# Patient Record
Sex: MALE | Race: BLACK OR AFRICAN AMERICAN | NOT HISPANIC OR LATINO | Employment: FULL TIME | URBAN - METROPOLITAN AREA
[De-identification: names, ages, dates, MRNs, and addresses within clinical notes are randomized per-mention and may not be internally consistent; named-entity substitution may affect disease eponyms.]

---

## 2023-02-19 PROBLEM — E78.5 DYSLIPIDEMIA: Status: ACTIVE | Noted: 2023-02-19

## 2023-02-19 PROBLEM — D12.6 TUBULAR ADENOMA OF COLON: Status: ACTIVE | Noted: 2023-02-19

## 2023-02-19 PROBLEM — E78.5 HYPERLIPIDEMIA: Status: ACTIVE | Noted: 2023-02-19

## 2023-02-19 PROBLEM — I10 HYPERTENSION, UNCONTROLLED: Status: ACTIVE | Noted: 2023-02-19

## 2023-02-19 PROBLEM — I10 BENIGN ESSENTIAL HYPERTENSION: Status: ACTIVE | Noted: 2023-02-19

## 2023-02-19 PROBLEM — R97.20 ELEVATED PROSTATE SPECIFIC ANTIGEN (PSA): Status: ACTIVE | Noted: 2023-02-19

## 2023-02-19 PROBLEM — R22.9 SINGLE SKIN NODULE: Status: ACTIVE | Noted: 2023-02-19

## 2023-02-19 PROBLEM — H93.13 TINNITUS OF BOTH EARS: Status: ACTIVE | Noted: 2023-02-19

## 2023-02-19 PROBLEM — N40.0 BPH (BENIGN PROSTATIC HYPERPLASIA): Status: ACTIVE | Noted: 2023-02-19

## 2023-02-19 RX ORDER — AMLODIPINE BESYLATE 10 MG/1
1 TABLET ORAL DAILY
COMMUNITY
Start: 2019-05-09 | End: 2023-03-14 | Stop reason: SDUPTHER

## 2023-03-14 ENCOUNTER — LAB (OUTPATIENT)
Dept: LAB | Facility: LAB | Age: 68
End: 2023-03-14
Payer: COMMERCIAL

## 2023-03-14 ENCOUNTER — OFFICE VISIT (OUTPATIENT)
Dept: PRIMARY CARE | Facility: CLINIC | Age: 68
End: 2023-03-14
Payer: COMMERCIAL

## 2023-03-14 VITALS
BODY MASS INDEX: 29.03 KG/M2 | OXYGEN SATURATION: 97 % | SYSTOLIC BLOOD PRESSURE: 187 MMHG | HEIGHT: 73 IN | HEART RATE: 69 BPM | DIASTOLIC BLOOD PRESSURE: 111 MMHG | WEIGHT: 219 LBS

## 2023-03-14 DIAGNOSIS — I10 HYPERTENSION, UNCONTROLLED: Primary | ICD-10-CM

## 2023-03-14 DIAGNOSIS — R97.20 ELEVATED PROSTATE SPECIFIC ANTIGEN (PSA): ICD-10-CM

## 2023-03-14 DIAGNOSIS — Z13.1 SCREENING FOR DIABETES MELLITUS: ICD-10-CM

## 2023-03-14 DIAGNOSIS — E78.00 PURE HYPERCHOLESTEROLEMIA: ICD-10-CM

## 2023-03-14 DIAGNOSIS — I10 HYPERTENSION, UNCONTROLLED: ICD-10-CM

## 2023-03-14 DIAGNOSIS — R97.20 ELEVATED PSA: ICD-10-CM

## 2023-03-14 DIAGNOSIS — D64.9 ANEMIA, UNSPECIFIED TYPE: ICD-10-CM

## 2023-03-14 DIAGNOSIS — D12.6 TUBULAR ADENOMA OF COLON: ICD-10-CM

## 2023-03-14 DIAGNOSIS — Z00.00 ANNUAL PHYSICAL EXAM: ICD-10-CM

## 2023-03-14 LAB
ALANINE AMINOTRANSFERASE (SGPT) (U/L) IN SER/PLAS: 19 U/L (ref 10–52)
ALBUMIN (G/DL) IN SER/PLAS: 4.2 G/DL (ref 3.4–5)
ALKALINE PHOSPHATASE (U/L) IN SER/PLAS: 67 U/L (ref 33–136)
ANION GAP IN SER/PLAS: 12 MMOL/L (ref 10–20)
ASPARTATE AMINOTRANSFERASE (SGOT) (U/L) IN SER/PLAS: 21 U/L (ref 9–39)
BILIRUBIN TOTAL (MG/DL) IN SER/PLAS: 0.8 MG/DL (ref 0–1.2)
CALCIUM (MG/DL) IN SER/PLAS: 9.6 MG/DL (ref 8.6–10.6)
CARBON DIOXIDE, TOTAL (MMOL/L) IN SER/PLAS: 27 MMOL/L (ref 21–32)
CHLORIDE (MMOL/L) IN SER/PLAS: 106 MMOL/L (ref 98–107)
CHOLESTEROL (MG/DL) IN SER/PLAS: 250 MG/DL (ref 0–199)
CHOLESTEROL IN HDL (MG/DL) IN SER/PLAS: 46.7 MG/DL
CHOLESTEROL/HDL RATIO: 5.4
CREATININE (MG/DL) IN SER/PLAS: 1.31 MG/DL (ref 0.5–1.3)
ERYTHROCYTE DISTRIBUTION WIDTH (RATIO) BY AUTOMATED COUNT: 15.5 % (ref 11.5–14.5)
ERYTHROCYTE MEAN CORPUSCULAR HEMOGLOBIN CONCENTRATION (G/DL) BY AUTOMATED: 31.2 G/DL (ref 32–36)
ERYTHROCYTE MEAN CORPUSCULAR VOLUME (FL) BY AUTOMATED COUNT: 96 FL (ref 80–100)
ERYTHROCYTES (10*6/UL) IN BLOOD BY AUTOMATED COUNT: 5.12 X10E12/L (ref 4.5–5.9)
ESTIMATED AVERAGE GLUCOSE FOR HBA1C: 120 MG/DL
GFR MALE: 60 ML/MIN/1.73M2
GLUCOSE (MG/DL) IN SER/PLAS: 81 MG/DL (ref 74–99)
HEMATOCRIT (%) IN BLOOD BY AUTOMATED COUNT: 49.3 % (ref 41–52)
HEMOGLOBIN (G/DL) IN BLOOD: 15.4 G/DL (ref 13.5–17.5)
HEMOGLOBIN A1C/HEMOGLOBIN TOTAL IN BLOOD: 5.8 %
LDL: 183 MG/DL (ref 0–99)
LEUKOCYTES (10*3/UL) IN BLOOD BY AUTOMATED COUNT: 8.5 X10E9/L (ref 4.4–11.3)
NRBC (PER 100 WBCS) BY AUTOMATED COUNT: 0 /100 WBC (ref 0–0)
PLATELETS (10*3/UL) IN BLOOD AUTOMATED COUNT: 258 X10E9/L (ref 150–450)
POTASSIUM (MMOL/L) IN SER/PLAS: 5.1 MMOL/L (ref 3.5–5.3)
PROSTATE SPECIFIC ANTIGEN,SCREEN: 7.5 NG/ML (ref 0–4)
PROTEIN TOTAL: 7.3 G/DL (ref 6.4–8.2)
SODIUM (MMOL/L) IN SER/PLAS: 140 MMOL/L (ref 136–145)
TRIGLYCERIDE (MG/DL) IN SER/PLAS: 103 MG/DL (ref 0–149)
UREA NITROGEN (MG/DL) IN SER/PLAS: 13 MG/DL (ref 6–23)
VLDL: 21 MG/DL (ref 0–40)

## 2023-03-14 PROCEDURE — 85027 COMPLETE CBC AUTOMATED: CPT

## 2023-03-14 PROCEDURE — 80053 COMPREHEN METABOLIC PANEL: CPT

## 2023-03-14 PROCEDURE — 1160F RVW MEDS BY RX/DR IN RCRD: CPT | Performed by: INTERNAL MEDICINE

## 2023-03-14 PROCEDURE — 99213 OFFICE O/P EST LOW 20 MIN: CPT | Performed by: INTERNAL MEDICINE

## 2023-03-14 PROCEDURE — 1170F FXNL STATUS ASSESSED: CPT | Performed by: INTERNAL MEDICINE

## 2023-03-14 PROCEDURE — 3080F DIAST BP >= 90 MM HG: CPT | Performed by: INTERNAL MEDICINE

## 2023-03-14 PROCEDURE — 36415 COLL VENOUS BLD VENIPUNCTURE: CPT

## 2023-03-14 PROCEDURE — 3077F SYST BP >= 140 MM HG: CPT | Performed by: INTERNAL MEDICINE

## 2023-03-14 PROCEDURE — 80061 LIPID PANEL: CPT

## 2023-03-14 PROCEDURE — 1159F MED LIST DOCD IN RCRD: CPT | Performed by: INTERNAL MEDICINE

## 2023-03-14 PROCEDURE — 84153 ASSAY OF PSA TOTAL: CPT

## 2023-03-14 PROCEDURE — 83036 HEMOGLOBIN GLYCOSYLATED A1C: CPT

## 2023-03-14 RX ORDER — FLUTICASONE PROPIONATE 50 MCG
1 SPRAY, SUSPENSION (ML) NASAL DAILY
COMMUNITY
Start: 2022-10-29

## 2023-03-14 RX ORDER — AMLODIPINE BESYLATE 10 MG/1
10 TABLET ORAL DAILY
Qty: 90 TABLET | Refills: 2 | Status: SHIPPED | OUTPATIENT
Start: 2023-03-14 | End: 2023-03-15 | Stop reason: SDUPTHER

## 2023-03-14 ASSESSMENT — ACTIVITIES OF DAILY LIVING (ADL)
DRESSING YOURSELF: INDEPENDENT
GROOMING: INDEPENDENT
TOILETING: INDEPENDENT
HEARING - LEFT EAR: FUNCTIONAL
PATIENT'S MEMORY ADEQUATE TO SAFELY COMPLETE DAILY ACTIVITIES?: YES
FEEDING YOURSELF: INDEPENDENT
ADEQUATE_TO_COMPLETE_ADL: YES
BATHING: INDEPENDENT
WALKS IN HOME: INDEPENDENT
HEARING - RIGHT EAR: FUNCTIONAL
JUDGMENT_ADEQUATE_SAFELY_COMPLETE_DAILY_ACTIVITIES: YES

## 2023-03-14 ASSESSMENT — COLUMBIA-SUICIDE SEVERITY RATING SCALE - C-SSRS
1. IN THE PAST MONTH, HAVE YOU WISHED YOU WERE DEAD OR WISHED YOU COULD GO TO SLEEP AND NOT WAKE UP?: NO
2. HAVE YOU ACTUALLY HAD ANY THOUGHTS OF KILLING YOURSELF?: NO
6. HAVE YOU EVER DONE ANYTHING, STARTED TO DO ANYTHING, OR PREPARED TO DO ANYTHING TO END YOUR LIFE?: NO

## 2023-03-14 ASSESSMENT — PATIENT HEALTH QUESTIONNAIRE - PHQ9
1. LITTLE INTEREST OR PLEASURE IN DOING THINGS: NOT AT ALL
2. FEELING DOWN, DEPRESSED OR HOPELESS: NOT AT ALL
SUM OF ALL RESPONSES TO PHQ9 QUESTIONS 1 AND 2: 0

## 2023-03-14 NOTE — PROGRESS NOTES
"Patient ID: Peña Arenas is a 68 y.o. male who presents for Annual Exam.    BP (!) 187/111   Pulse 69   Ht 1.854 m (6' 1\")   Wt 99.3 kg (219 lb)   SpO2 97%   BMI 28.89 kg/m²     HPI      68-year-old Afro-American male patient is here for follow-up patient has hypertension for many years.  Denies headache, denies palpitation, denies neck stiffness, denies double vision.. And blurry vision, denies shortness of breath, denies chest pain ,denies PND, denies orthopnea, and  denies leg swelling.    He ran out of medication  He is taking amlodipine 10 mg every day      Hyperlipidemia not on any medication      Elevated PSA  No blood in the urine  No urinary symptoms        Subjective     Review of Systems   All other systems reviewed and are negative.      Objective     Physical Exam  Vitals and nursing note reviewed.   Constitutional:       Appearance: Normal appearance.   HENT:      Head: Normocephalic and atraumatic.   Cardiovascular:      Rate and Rhythm: Normal rate and regular rhythm.      Pulses: Normal pulses.      Heart sounds: Normal heart sounds.   Pulmonary:      Effort: Pulmonary effort is normal.      Breath sounds: Normal breath sounds.   Neurological:      General: No focal deficit present.      Mental Status: He is alert and oriented to person, place, and time.   Psychiatric:         Mood and Affect: Mood normal.         Behavior: Behavior normal.         Thought Content: Thought content normal.         Judgment: Judgment normal.         Lab Results   Component Value Date    WBC 8.0 03/02/2022    HGB 15.0 03/02/2022    HCT 47.3 03/02/2022    MCV 98 03/02/2022     03/02/2022           Problem List Items Addressed This Visit          Circulatory    Hypertension, uncontrolled - Primary    Relevant Medications    amLODIPine (Norvasc) 10 mg tablet    Other Relevant Orders    Comprehensive Metabolic Panel       Digestive    Tubular adenoma of colon       Genitourinary    Elevated prostate specific " antigen (PSA)    Relevant Orders    Referral to Urology       Other    Hyperlipidemia    Relevant Orders    Lipid Panel     Other Visit Diagnoses       Annual physical exam        Relevant Orders    Comprehensive Metabolic Panel    Anemia, unspecified type        Relevant Orders    CBC    Screening for diabetes mellitus        Relevant Orders    Hemoglobin A1C    Elevated PSA        Relevant Orders    Prostate Spec.Ag,Screen               Talk to the patient in detail, that since his blood pressure is uncontrolled  It can put him at high risk for complications from uncontrolled diabetes  Including stroke, heart failure kidney failure    Amlodipine 10 mg 1 pill every day filled  We will see him in 2 months time  Advised him to avoid any extra salt while eating  Advised him to avoid any alcohol while drinking  Advised him to use avoid any NSAID use  Will get CBC, comprehensive metabolic panel, lipid, PSA,    He needs to follow-up with urology with elevated PSA  Which she has not done  I told him elevated PSA could be a sign of prostrate cancer

## 2023-03-15 DIAGNOSIS — I10 HYPERTENSION, UNCONTROLLED: ICD-10-CM

## 2023-03-15 RX ORDER — AMLODIPINE BESYLATE 10 MG/1
10 TABLET ORAL DAILY
Qty: 90 TABLET | Refills: 2 | Status: SHIPPED | OUTPATIENT
Start: 2023-03-15 | End: 2023-05-15 | Stop reason: SDUPTHER

## 2023-03-15 NOTE — TELEPHONE ENCOUNTER
Spoke with patient and advised of message   ----- Message from Tiny Rodriguez MD sent at 3/14/2023  2:17 PM EDT -----  Please notify the notify the patient that his lab overall looks good except 2 things PSA is elevated though it has come down compared to last time and I already placed a referral for urology he most likely will be called by the urology to make that appointment and make sure he takes care of that and since his cholesterol and LDL cholesterol is very high I am not going to put him on any medications right away rather I would like him to lose weight do some exercise eat healthy cut back portion size cut back total calories intake cut back carbs sweets fast food deep-fried is animal fat and need to take more fruits and vegetables

## 2023-03-17 ENCOUNTER — TELEPHONE (OUTPATIENT)
Dept: PRIMARY CARE | Facility: CLINIC | Age: 68
End: 2023-03-17
Payer: COMMERCIAL

## 2023-03-17 NOTE — TELEPHONE ENCOUNTER
----- Message from Tiny Rodriguez MD sent at 3/14/2023  2:17 PM EDT -----  Please notify the notify the patient that his lab overall looks good except 2 things PSA is elevated though it has come down compared to last time and I already placed a referral for urology he most likely will be called by the urology to make that appointment and make sure he takes care of that and since his cholesterol and LDL cholesterol is very high I am not going to put him on any medications right away rather I would like him to lose weight do some exercise eat healthy cut back portion size cut back total calories intake cut back carbs sweets fast food deep-fried is animal fat and need to take more fruits and vegetables

## 2023-05-15 ENCOUNTER — OFFICE VISIT (OUTPATIENT)
Dept: PRIMARY CARE | Facility: CLINIC | Age: 68
End: 2023-05-15
Payer: COMMERCIAL

## 2023-05-15 VITALS
WEIGHT: 218.4 LBS | OXYGEN SATURATION: 96 % | HEART RATE: 66 BPM | DIASTOLIC BLOOD PRESSURE: 72 MMHG | SYSTOLIC BLOOD PRESSURE: 120 MMHG | BODY MASS INDEX: 28.81 KG/M2

## 2023-05-15 DIAGNOSIS — E78.5 HYPERLIPIDEMIA, UNSPECIFIED HYPERLIPIDEMIA TYPE: ICD-10-CM

## 2023-05-15 DIAGNOSIS — D12.6 TUBULAR ADENOMA OF COLON: Primary | ICD-10-CM

## 2023-05-15 DIAGNOSIS — N40.1 BENIGN PROSTATIC HYPERPLASIA WITH LOWER URINARY TRACT SYMPTOMS, SYMPTOM DETAILS UNSPECIFIED: ICD-10-CM

## 2023-05-15 DIAGNOSIS — I10 HYPERTENSION, UNCONTROLLED: ICD-10-CM

## 2023-05-15 DIAGNOSIS — I10 BENIGN ESSENTIAL HYPERTENSION: ICD-10-CM

## 2023-05-15 DIAGNOSIS — R97.20 ELEVATED PROSTATE SPECIFIC ANTIGEN (PSA): ICD-10-CM

## 2023-05-15 PROCEDURE — 1159F MED LIST DOCD IN RCRD: CPT | Performed by: INTERNAL MEDICINE

## 2023-05-15 PROCEDURE — 99213 OFFICE O/P EST LOW 20 MIN: CPT | Performed by: INTERNAL MEDICINE

## 2023-05-15 PROCEDURE — 3078F DIAST BP <80 MM HG: CPT | Performed by: INTERNAL MEDICINE

## 2023-05-15 PROCEDURE — 1160F RVW MEDS BY RX/DR IN RCRD: CPT | Performed by: INTERNAL MEDICINE

## 2023-05-15 PROCEDURE — 3074F SYST BP LT 130 MM HG: CPT | Performed by: INTERNAL MEDICINE

## 2023-05-15 RX ORDER — AMLODIPINE BESYLATE 10 MG/1
10 TABLET ORAL DAILY
Qty: 90 TABLET | Refills: 2 | Status: SHIPPED | OUTPATIENT
Start: 2023-05-15 | End: 2024-02-24 | Stop reason: SDUPTHER

## 2023-05-15 ASSESSMENT — ENCOUNTER SYMPTOMS
ORTHOPNEA: 0
PALPITATIONS: 0
SHORTNESS OF BREATH: 0
HEADACHES: 0
HYPERTENSION: 1
CONSTITUTIONAL NEGATIVE: 1
PND: 0
NECK PAIN: 0
BLURRED VISION: 0

## 2023-05-15 ASSESSMENT — PATIENT HEALTH QUESTIONNAIRE - PHQ9
SUM OF ALL RESPONSES TO PHQ9 QUESTIONS 1 AND 2: 0
2. FEELING DOWN, DEPRESSED OR HOPELESS: NOT AT ALL
1. LITTLE INTEREST OR PLEASURE IN DOING THINGS: NOT AT ALL

## 2023-05-15 NOTE — PROGRESS NOTES
/72   Pulse 66   Wt 99.1 kg (218 lb 6.4 oz)   SpO2 96%   BMI 28.81 kg/m²     Hypertension  This is a chronic problem. Episode onset: MANY YEARS. The problem is controlled. Pertinent negatives include no anxiety, blurred vision, chest pain, headaches, neck pain, orthopnea, palpitations, peripheral edema, PND or shortness of breath. There are no associated agents to hypertension. There are no known risk factors for coronary artery disease. Past treatments include calcium channel blockers. The current treatment provides significant improvement. There are no compliance problems.  There is no history of angina, kidney disease, CAD/MI, CVA, heart failure, left ventricular hypertrophy, PVD or retinopathy. There is no history of chronic renal disease or sleep apnea.       Subjective     Review of Systems   Constitutional: Negative.    Eyes:  Negative for blurred vision.   Respiratory:  Negative for shortness of breath.    Cardiovascular:  Negative for chest pain, palpitations, orthopnea and PND.   Musculoskeletal:  Negative for neck pain.   Neurological:  Negative for headaches.   All other systems reviewed and are negative.      Objective     Physical Exam  Vitals and nursing note reviewed.   Neck:      Vascular: No carotid bruit.   Cardiovascular:      Rate and Rhythm: Normal rate and regular rhythm.      Pulses: Normal pulses.      Heart sounds: Normal heart sounds. No murmur heard.  Pulmonary:      Effort: Pulmonary effort is normal.      Breath sounds: Normal breath sounds.   Musculoskeletal:      Right lower leg: No edema.      Left lower leg: No edema.   Lymphadenopathy:      Cervical: No cervical adenopathy.   Skin:     Capillary Refill: Capillary refill takes more than 3 seconds.   Neurological:      General: No focal deficit present.      Mental Status: He is oriented to person, place, and time.   Psychiatric:         Mood and Affect: Mood normal.         Behavior: Behavior normal.         Thought  Content: Thought content normal.         Judgment: Judgment normal.         Lab Results   Component Value Date    WBC 8.5 03/14/2023    HGB 15.4 03/14/2023    HCT 49.3 03/14/2023    MCV 96 03/14/2023     03/14/2023           Problem List Items Addressed This Visit          Circulatory    Hypertension, uncontrolled - Primary       Digestive    Tubular adenoma of colon       Genitourinary    BPH (benign prostatic hyperplasia)    Elevated prostate specific antigen (PSA)       Other    Hyperlipidemia            A/P       AMLODIPINE 10MG 1 PILL EVERY DAY FILLED   REFFERAL COLONOSCOPY  WITH DR LIGIA Krause   REFFERAL UROLOGY   OFFERED PNEUMOVAX DEFFERED   F/U  NEEDED // 6 MONTHS TIME               Advance Care Planning Note     Discussion Date: 05/15/23   Discussion Participants: patient    The patient wishes to discuss Advance Care Planning today and the following is a brief summary of our discussion.     Patient has capacity to make their own medical decisions: Yes  Health Care Agent/Surrogate Decision Maker documented in chart: NO    Documents on file and valid:  Advance Directive/Living Will: No   Health Care Power of : No  Other: NA    Communication of Medical Status/Prognosis:   NA     Communication of Treatment Goals/Options:       Discussed with the patient end-of-life choices and healthcare power of  patient is presently full code he does not have a living will or healthcare power of  he will try to get it done later on and will bring it on the next office visit so that it can be scanned into the chart    Treatment Decisions  DISCUSSED     Time Statement: Total face to face time spent on advance care planning was 5 minutes with 5 minutes spent in counseling, including the explanation.    Tiny Rodriguez MD  5/15/2023 9:21 AM

## 2024-02-24 DIAGNOSIS — I10 HYPERTENSION, UNCONTROLLED: ICD-10-CM

## 2024-02-26 RX ORDER — AMLODIPINE BESYLATE 10 MG/1
10 TABLET ORAL DAILY
Qty: 30 TABLET | Refills: 0 | Status: SHIPPED | OUTPATIENT
Start: 2024-02-26

## 2024-12-23 ENCOUNTER — APPOINTMENT (OUTPATIENT)
Dept: CARDIOLOGY | Facility: HOSPITAL | Age: 69
End: 2024-12-23
Payer: COMMERCIAL

## 2024-12-23 ENCOUNTER — HOSPITAL ENCOUNTER (INPATIENT)
Facility: HOSPITAL | Age: 69
LOS: 4 days | Discharge: HOME | End: 2024-12-27
Attending: HOSPITALIST | Admitting: INTERNAL MEDICINE
Payer: COMMERCIAL

## 2024-12-23 DIAGNOSIS — I21.3 ST ELEVATION MYOCARDIAL INFARCTION (STEMI), UNSPECIFIED ARTERY (MULTI): Primary | ICD-10-CM

## 2024-12-23 DIAGNOSIS — I22.9 SUBSEQUENT ST ELEVATION (STEMI) MYOCARDIAL INFARCTION OF UNSPECIFIED SITE (MULTI): ICD-10-CM

## 2024-12-23 DIAGNOSIS — I21.02 ST ELEVATION (STEMI) MYOCARDIAL INFARCTION INVOLVING LEFT ANTERIOR DESCENDING CORONARY ARTERY (MULTI): ICD-10-CM

## 2024-12-23 DIAGNOSIS — K59.00 CONSTIPATION, UNSPECIFIED CONSTIPATION TYPE: ICD-10-CM

## 2024-12-23 DIAGNOSIS — I10 BENIGN ESSENTIAL HYPERTENSION: ICD-10-CM

## 2024-12-23 DIAGNOSIS — Z95.5 STATUS POST INSERTION OF DRUG ELUTING CORONARY ARTERY STENT: ICD-10-CM

## 2024-12-23 DIAGNOSIS — I21.3 STEMI (ST ELEVATION MYOCARDIAL INFARCTION) (MULTI): ICD-10-CM

## 2024-12-23 DIAGNOSIS — R33.9 RETENTION OF URINE: ICD-10-CM

## 2024-12-23 DIAGNOSIS — I10 HYPERTENSION, UNCONTROLLED: ICD-10-CM

## 2024-12-23 DIAGNOSIS — I21.29: ICD-10-CM

## 2024-12-23 LAB
ACT BLD: 332 SEC (ref 83–199)
ACT BLD: 337 SEC (ref 83–199)
ALBUMIN SERPL BCP-MCNC: 3.9 G/DL (ref 3.4–5)
ALP SERPL-CCNC: 74 U/L (ref 33–136)
ALT SERPL W P-5'-P-CCNC: 20 U/L (ref 10–52)
ANION GAP SERPL CALC-SCNC: 13 MMOL/L (ref 10–20)
AORTIC VALVE MEAN GRADIENT: 3 MMHG
AORTIC VALVE PEAK VELOCITY: 1.13 M/S
APTT PPP: 31 SECONDS (ref 27–38)
AST SERPL W P-5'-P-CCNC: 79 U/L (ref 9–39)
ATRIAL RATE: 77 BPM
AV PEAK GRADIENT: 5 MMHG
AVA (PEAK VEL): 3.37 CM2
AVA (VTI): 3.24 CM2
BASOPHILS # BLD AUTO: 0.05 X10*3/UL (ref 0–0.1)
BASOPHILS NFR BLD AUTO: 0.4 %
BILIRUB SERPL-MCNC: 0.6 MG/DL (ref 0–1.2)
BUN SERPL-MCNC: 7 MG/DL (ref 6–23)
CALCIUM SERPL-MCNC: 9.3 MG/DL (ref 8.6–10.3)
CARDIAC TROPONIN I PNL SERPL HS: 8174 NG/L (ref 0–20)
CHLORIDE SERPL-SCNC: 101 MMOL/L (ref 98–107)
CO2 SERPL-SCNC: 27 MMOL/L (ref 21–32)
CREAT SERPL-MCNC: 1.09 MG/DL (ref 0.5–1.3)
EGFRCR SERPLBLD CKD-EPI 2021: 74 ML/MIN/1.73M*2
EJECTION FRACTION APICAL 4 CHAMBER: 40.9
EJECTION FRACTION: 44 %
EOSINOPHIL # BLD AUTO: 0.03 X10*3/UL (ref 0–0.7)
EOSINOPHIL NFR BLD AUTO: 0.2 %
ERYTHROCYTE [DISTWIDTH] IN BLOOD BY AUTOMATED COUNT: 14.4 % (ref 11.5–14.5)
GLUCOSE SERPL-MCNC: 121 MG/DL (ref 74–99)
HCT VFR BLD AUTO: 47.8 % (ref 41–52)
HGB BLD-MCNC: 15.7 G/DL (ref 13.5–17.5)
IMM GRANULOCYTES # BLD AUTO: 0.05 X10*3/UL (ref 0–0.7)
IMM GRANULOCYTES NFR BLD AUTO: 0.4 % (ref 0–0.9)
INR PPP: 1 (ref 0.9–1.1)
LEFT ATRIUM VOLUME AREA LENGTH INDEX BSA: 27 ML/M2
LEFT VENTRICLE INTERNAL DIMENSION DIASTOLE: 4.34 CM (ref 3.5–6)
LEFT VENTRICULAR OUTFLOW TRACT DIAMETER: 2.24 CM
LYMPHOCYTES # BLD AUTO: 3.19 X10*3/UL (ref 1.2–4.8)
LYMPHOCYTES NFR BLD AUTO: 24.1 %
MCH RBC QN AUTO: 30.4 PG (ref 26–34)
MCHC RBC AUTO-ENTMCNC: 32.8 G/DL (ref 32–36)
MCV RBC AUTO: 93 FL (ref 80–100)
MITRAL VALVE E/A RATIO: 0.35
MONOCYTES # BLD AUTO: 1 X10*3/UL (ref 0.1–1)
MONOCYTES NFR BLD AUTO: 7.6 %
NEUTROPHILS # BLD AUTO: 8.92 X10*3/UL (ref 1.2–7.7)
NEUTROPHILS NFR BLD AUTO: 67.3 %
NRBC BLD-RTO: 0 /100 WBCS (ref 0–0)
P AXIS: 44 DEGREES
P OFFSET: 180 MS
P ONSET: 122 MS
PLATELET # BLD AUTO: 277 X10*3/UL (ref 150–450)
POTASSIUM SERPL-SCNC: 3.8 MMOL/L (ref 3.5–5.3)
PR INTERVAL: 176 MS
PROT SERPL-MCNC: 8 G/DL (ref 6.4–8.2)
PROTHROMBIN TIME: 11.5 SECONDS (ref 9.8–12.8)
Q ONSET: 210 MS
QRS COUNT: 13 BEATS
QRS DURATION: 128 MS
QT INTERVAL: 434 MS
QTC CALCULATION(BAZETT): 491 MS
QTC FREDERICIA: 471 MS
R AXIS: 50 DEGREES
RBC # BLD AUTO: 5.16 X10*6/UL (ref 4.5–5.9)
RIGHT VENTRICLE FREE WALL PEAK S': 8 CM/S
RIGHT VENTRICLE PEAK SYSTOLIC PRESSURE: 17.5 MMHG
SODIUM SERPL-SCNC: 137 MMOL/L (ref 136–145)
T AXIS: 54 DEGREES
T OFFSET: 427 MS
TRICUSPID ANNULAR PLANE SYSTOLIC EXCURSION: 1 CM
VENTRICULAR RATE: 77 BPM
WBC # BLD AUTO: 13.2 X10*3/UL (ref 4.4–11.3)

## 2024-12-23 PROCEDURE — 99152 MOD SED SAME PHYS/QHP 5/>YRS: CPT | Performed by: HOSPITALIST

## 2024-12-23 PROCEDURE — 2500000002 HC RX 250 W HCPCS SELF ADMINISTERED DRUGS (ALT 637 FOR MEDICARE OP, ALT 636 FOR OP/ED): Performed by: EMERGENCY MEDICINE

## 2024-12-23 PROCEDURE — 85347 COAGULATION TIME ACTIVATED: CPT

## 2024-12-23 PROCEDURE — 93005 ELECTROCARDIOGRAM TRACING: CPT

## 2024-12-23 PROCEDURE — C1725 CATH, TRANSLUMIN NON-LASER: HCPCS | Performed by: HOSPITALIST

## 2024-12-23 PROCEDURE — 2500000004 HC RX 250 GENERAL PHARMACY W/ HCPCS (ALT 636 FOR OP/ED): Performed by: NURSE PRACTITIONER

## 2024-12-23 PROCEDURE — 84484 ASSAY OF TROPONIN QUANT: CPT | Performed by: EMERGENCY MEDICINE

## 2024-12-23 PROCEDURE — B2111ZZ FLUOROSCOPY OF MULTIPLE CORONARY ARTERIES USING LOW OSMOLAR CONTRAST: ICD-10-PCS | Performed by: HOSPITALIST

## 2024-12-23 PROCEDURE — 2500000001 HC RX 250 WO HCPCS SELF ADMINISTERED DRUGS (ALT 637 FOR MEDICARE OP): Performed by: EMERGENCY MEDICINE

## 2024-12-23 PROCEDURE — 2500000002 HC RX 250 W HCPCS SELF ADMINISTERED DRUGS (ALT 637 FOR MEDICARE OP, ALT 636 FOR OP/ED): Performed by: INTERNAL MEDICINE

## 2024-12-23 PROCEDURE — 92941 PRQ TRLML REVSC TOT OCCL AMI: CPT | Performed by: HOSPITALIST

## 2024-12-23 PROCEDURE — 2500000001 HC RX 250 WO HCPCS SELF ADMINISTERED DRUGS (ALT 637 FOR MEDICARE OP): Performed by: INTERNAL MEDICINE

## 2024-12-23 PROCEDURE — C9606 PERC D-E COR REVASC W AMI S: HCPCS | Mod: LC | Performed by: HOSPITALIST

## 2024-12-23 PROCEDURE — 4A023N7 MEASUREMENT OF CARDIAC SAMPLING AND PRESSURE, LEFT HEART, PERCUTANEOUS APPROACH: ICD-10-PCS | Performed by: HOSPITALIST

## 2024-12-23 PROCEDURE — 80053 COMPREHEN METABOLIC PANEL: CPT | Performed by: EMERGENCY MEDICINE

## 2024-12-23 PROCEDURE — 36415 COLL VENOUS BLD VENIPUNCTURE: CPT | Performed by: EMERGENCY MEDICINE

## 2024-12-23 PROCEDURE — 2020000001 HC ICU ROOM DAILY

## 2024-12-23 PROCEDURE — 93458 L HRT ARTERY/VENTRICLE ANGIO: CPT | Performed by: HOSPITALIST

## 2024-12-23 PROCEDURE — C1894 INTRO/SHEATH, NON-LASER: HCPCS | Performed by: HOSPITALIST

## 2024-12-23 PROCEDURE — 93010 ELECTROCARDIOGRAM REPORT: CPT | Performed by: INTERNAL MEDICINE

## 2024-12-23 PROCEDURE — 85025 COMPLETE CBC W/AUTO DIFF WBC: CPT | Performed by: EMERGENCY MEDICINE

## 2024-12-23 PROCEDURE — C8929 TTE W OR WO FOL WCON,DOPPLER: HCPCS

## 2024-12-23 PROCEDURE — C1760 CLOSURE DEV, VASC: HCPCS | Performed by: HOSPITALIST

## 2024-12-23 PROCEDURE — RXMED WILLOW AMBULATORY MEDICATION CHARGE

## 2024-12-23 PROCEDURE — 99285 EMERGENCY DEPT VISIT HI MDM: CPT | Mod: 25 | Performed by: EMERGENCY MEDICINE

## 2024-12-23 PROCEDURE — 99223 1ST HOSP IP/OBS HIGH 75: CPT | Performed by: INTERNAL MEDICINE

## 2024-12-23 PROCEDURE — 027034Z DILATION OF CORONARY ARTERY, ONE ARTERY WITH DRUG-ELUTING INTRALUMINAL DEVICE, PERCUTANEOUS APPROACH: ICD-10-PCS | Performed by: HOSPITALIST

## 2024-12-23 PROCEDURE — 96374 THER/PROPH/DIAG INJ IV PUSH: CPT | Mod: 59

## 2024-12-23 PROCEDURE — 2500000004 HC RX 250 GENERAL PHARMACY W/ HCPCS (ALT 636 FOR OP/ED): Performed by: EMERGENCY MEDICINE

## 2024-12-23 PROCEDURE — 2500000005 HC RX 250 GENERAL PHARMACY W/O HCPCS: Performed by: HOSPITALIST

## 2024-12-23 PROCEDURE — 85610 PROTHROMBIN TIME: CPT | Performed by: EMERGENCY MEDICINE

## 2024-12-23 PROCEDURE — 2720000007 HC OR 272 NO HCPCS: Performed by: HOSPITALIST

## 2024-12-23 PROCEDURE — 99291 CRITICAL CARE FIRST HOUR: CPT | Performed by: NURSE PRACTITIONER

## 2024-12-23 PROCEDURE — 2780000003 HC OR 278 NO HCPCS: Performed by: HOSPITALIST

## 2024-12-23 PROCEDURE — 2500000002 HC RX 250 W HCPCS SELF ADMINISTERED DRUGS (ALT 637 FOR MEDICARE OP, ALT 636 FOR OP/ED): Performed by: NURSE PRACTITIONER

## 2024-12-23 PROCEDURE — C1874 STENT, COATED/COV W/DEL SYS: HCPCS | Performed by: HOSPITALIST

## 2024-12-23 PROCEDURE — 2500000001 HC RX 250 WO HCPCS SELF ADMINISTERED DRUGS (ALT 637 FOR MEDICARE OP): Performed by: NURSE PRACTITIONER

## 2024-12-23 PROCEDURE — 99153 MOD SED SAME PHYS/QHP EA: CPT | Performed by: HOSPITALIST

## 2024-12-23 PROCEDURE — C1769 GUIDE WIRE: HCPCS | Performed by: HOSPITALIST

## 2024-12-23 PROCEDURE — 2550000001 HC RX 255 CONTRASTS: Performed by: HOSPITALIST

## 2024-12-23 PROCEDURE — 2500000004 HC RX 250 GENERAL PHARMACY W/ HCPCS (ALT 636 FOR OP/ED): Performed by: HOSPITALIST

## 2024-12-23 PROCEDURE — 93306 TTE W/DOPPLER COMPLETE: CPT | Performed by: INTERNAL MEDICINE

## 2024-12-23 PROCEDURE — C1887 CATHETER, GUIDING: HCPCS | Performed by: HOSPITALIST

## 2024-12-23 PROCEDURE — 93454 CORONARY ARTERY ANGIO S&I: CPT | Performed by: HOSPITALIST

## 2024-12-23 PROCEDURE — 85730 THROMBOPLASTIN TIME PARTIAL: CPT | Performed by: EMERGENCY MEDICINE

## 2024-12-23 DEVICE — STENT ONYXNG25022UX ONYX 2.50X22RX
Type: IMPLANTABLE DEVICE | Site: HEART | Status: FUNCTIONAL
Brand: ONYX FRONTIER™

## 2024-12-23 RX ORDER — ACETAMINOPHEN 325 MG/1
650 TABLET ORAL EVERY 6 HOURS PRN
Status: DISCONTINUED | OUTPATIENT
Start: 2024-12-23 | End: 2024-12-27 | Stop reason: HOSPADM

## 2024-12-23 RX ORDER — ENOXAPARIN SODIUM 100 MG/ML
40 INJECTION SUBCUTANEOUS EVERY 24 HOURS
Status: DISCONTINUED | OUTPATIENT
Start: 2024-12-23 | End: 2024-12-26

## 2024-12-23 RX ORDER — CARVEDILOL 6.25 MG/1
6.25 TABLET ORAL 2 TIMES DAILY
Status: DISCONTINUED | OUTPATIENT
Start: 2024-12-23 | End: 2024-12-27 | Stop reason: HOSPADM

## 2024-12-23 RX ORDER — CARVEDILOL 6.25 MG/1
6.25 TABLET ORAL 2 TIMES DAILY
Status: DISCONTINUED | OUTPATIENT
Start: 2024-12-24 | End: 2024-12-23

## 2024-12-23 RX ORDER — MIDAZOLAM HYDROCHLORIDE 1 MG/ML
INJECTION, SOLUTION INTRAMUSCULAR; INTRAVENOUS AS NEEDED
Status: DISCONTINUED | OUTPATIENT
Start: 2024-12-23 | End: 2024-12-23 | Stop reason: HOSPADM

## 2024-12-23 RX ORDER — ACETAMINOPHEN 325 MG/1
650 TABLET ORAL EVERY 6 HOURS PRN
Status: DISCONTINUED | OUTPATIENT
Start: 2024-12-23 | End: 2024-12-23

## 2024-12-23 RX ORDER — NAPROXEN SODIUM 220 MG/1
81 TABLET, FILM COATED ORAL DAILY
Status: DISCONTINUED | OUTPATIENT
Start: 2024-12-24 | End: 2024-12-27 | Stop reason: HOSPADM

## 2024-12-23 RX ORDER — SODIUM CHLORIDE 9 MG/ML
INJECTION, SOLUTION INTRAVENOUS CONTINUOUS PRN
Status: COMPLETED | OUTPATIENT
Start: 2024-12-23 | End: 2024-12-23

## 2024-12-23 RX ORDER — NAPROXEN SODIUM 220 MG/1
81 TABLET, FILM COATED ORAL DAILY
Qty: 3 TABLET | Refills: 0 | Status: SHIPPED | OUTPATIENT
Start: 2024-12-24

## 2024-12-23 RX ORDER — POLYETHYLENE GLYCOL 3350 17 G/17G
17 POWDER, FOR SOLUTION ORAL DAILY
Status: DISCONTINUED | OUTPATIENT
Start: 2024-12-23 | End: 2024-12-27 | Stop reason: HOSPADM

## 2024-12-23 RX ORDER — NITROGLYCERIN 0.4 MG/1
0.4 TABLET SUBLINGUAL EVERY 5 MIN PRN
Status: DISCONTINUED | OUTPATIENT
Start: 2024-12-23 | End: 2024-12-27 | Stop reason: HOSPADM

## 2024-12-23 RX ORDER — HEPARIN SODIUM 5000 [USP'U]/ML
4000 INJECTION, SOLUTION INTRAVENOUS; SUBCUTANEOUS ONCE
Status: COMPLETED | OUTPATIENT
Start: 2024-12-23 | End: 2024-12-23

## 2024-12-23 RX ORDER — NAPROXEN SODIUM 220 MG/1
324 TABLET, FILM COATED ORAL ONCE
Status: COMPLETED | OUTPATIENT
Start: 2024-12-23 | End: 2024-12-23

## 2024-12-23 RX ORDER — EPTIFIBATIDE 2 MG/ML
INJECTION, SOLUTION INTRAVENOUS CONTINUOUS PRN
Status: COMPLETED | OUTPATIENT
Start: 2024-12-23 | End: 2024-12-23

## 2024-12-23 RX ORDER — TAMSULOSIN HYDROCHLORIDE 0.4 MG/1
0.4 CAPSULE ORAL DAILY
Status: DISCONTINUED | OUTPATIENT
Start: 2024-12-23 | End: 2024-12-27 | Stop reason: HOSPADM

## 2024-12-23 RX ORDER — ATORVASTATIN CALCIUM 80 MG/1
80 TABLET, FILM COATED ORAL NIGHTLY
Qty: 90 TABLET | Refills: 0 | Status: SHIPPED | OUTPATIENT
Start: 2024-12-23

## 2024-12-23 RX ORDER — CARVEDILOL 6.25 MG/1
6.25 TABLET ORAL 2 TIMES DAILY
Qty: 180 TABLET | Refills: 0 | Status: SHIPPED | OUTPATIENT
Start: 2024-12-24

## 2024-12-23 RX ORDER — ATORVASTATIN CALCIUM 80 MG/1
80 TABLET, FILM COATED ORAL NIGHTLY
Status: DISCONTINUED | OUTPATIENT
Start: 2024-12-23 | End: 2024-12-27 | Stop reason: HOSPADM

## 2024-12-23 RX ORDER — ACETAMINOPHEN 650 MG/1
650 SUPPOSITORY RECTAL EVERY 6 HOURS PRN
Status: DISCONTINUED | OUTPATIENT
Start: 2024-12-23 | End: 2024-12-27 | Stop reason: HOSPADM

## 2024-12-23 RX ORDER — SODIUM CHLORIDE 9 MG/ML
200 INJECTION, SOLUTION INTRAVENOUS CONTINUOUS
Status: ACTIVE | OUTPATIENT
Start: 2024-12-23 | End: 2024-12-23

## 2024-12-23 RX ORDER — TALC
6 POWDER (GRAM) TOPICAL NIGHTLY PRN
Status: DISCONTINUED | OUTPATIENT
Start: 2024-12-23 | End: 2024-12-27 | Stop reason: HOSPADM

## 2024-12-23 RX ORDER — HEPARIN SODIUM 1000 [USP'U]/ML
INJECTION, SOLUTION INTRAVENOUS; SUBCUTANEOUS AS NEEDED
Status: DISCONTINUED | OUTPATIENT
Start: 2024-12-23 | End: 2024-12-23 | Stop reason: HOSPADM

## 2024-12-23 RX ORDER — FENTANYL CITRATE 50 UG/ML
INJECTION, SOLUTION INTRAMUSCULAR; INTRAVENOUS AS NEEDED
Status: DISCONTINUED | OUTPATIENT
Start: 2024-12-23 | End: 2024-12-23 | Stop reason: HOSPADM

## 2024-12-23 RX ORDER — LIDOCAINE HYDROCHLORIDE 10 MG/ML
INJECTION, SOLUTION EPIDURAL; INFILTRATION; INTRACAUDAL; PERINEURAL AS NEEDED
Status: DISCONTINUED | OUTPATIENT
Start: 2024-12-23 | End: 2024-12-23 | Stop reason: HOSPADM

## 2024-12-23 RX ORDER — ACETAMINOPHEN 160 MG/5ML
650 SOLUTION ORAL EVERY 6 HOURS PRN
Status: DISCONTINUED | OUTPATIENT
Start: 2024-12-23 | End: 2024-12-27 | Stop reason: HOSPADM

## 2024-12-23 RX ADMIN — ASPIRIN 81 MG 324 MG: 81 TABLET ORAL at 07:49

## 2024-12-23 RX ADMIN — TICAGRELOR 180 MG: 90 TABLET ORAL at 07:50

## 2024-12-23 RX ADMIN — ATORVASTATIN CALCIUM 80 MG: 80 TABLET, FILM COATED ORAL at 20:11

## 2024-12-23 RX ADMIN — CARVEDILOL 6.25 MG: 6.25 TABLET, FILM COATED ORAL at 12:38

## 2024-12-23 RX ADMIN — CARVEDILOL 6.25 MG: 6.25 TABLET, FILM COATED ORAL at 20:11

## 2024-12-23 RX ADMIN — TICAGRELOR 90 MG: 90 TABLET ORAL at 20:11

## 2024-12-23 RX ADMIN — SODIUM CHLORIDE 200 ML/HR: 9 INJECTION, SOLUTION INTRAVENOUS at 09:32

## 2024-12-23 RX ADMIN — HEPARIN SODIUM 4000 UNITS: 5000 INJECTION INTRAVENOUS; SUBCUTANEOUS at 07:50

## 2024-12-23 RX ADMIN — PERFLUTREN 10 ML OF DILUTION: 6.52 INJECTION, SUSPENSION INTRAVENOUS at 14:54

## 2024-12-23 RX ADMIN — ENOXAPARIN SODIUM 40 MG: 40 INJECTION SUBCUTANEOUS at 20:11

## 2024-12-23 RX ADMIN — TAMSULOSIN HYDROCHLORIDE 0.4 MG: 0.4 CAPSULE ORAL at 12:38

## 2024-12-23 SDOH — SOCIAL STABILITY: SOCIAL INSECURITY: HAS ANYONE EVER THREATENED TO HURT YOUR FAMILY OR YOUR PETS?: NO

## 2024-12-23 SDOH — HEALTH STABILITY: PHYSICAL HEALTH
HOW OFTEN DO YOU NEED TO HAVE SOMEONE HELP YOU WHEN YOU READ INSTRUCTIONS, PAMPHLETS, OR OTHER WRITTEN MATERIAL FROM YOUR DOCTOR OR PHARMACY?: NEVER

## 2024-12-23 SDOH — ECONOMIC STABILITY: INCOME INSECURITY: IN THE PAST 12 MONTHS HAS THE ELECTRIC, GAS, OIL, OR WATER COMPANY THREATENED TO SHUT OFF SERVICES IN YOUR HOME?: NO

## 2024-12-23 SDOH — SOCIAL STABILITY: SOCIAL INSECURITY
WITHIN THE LAST YEAR, HAVE YOU BEEN KICKED, HIT, SLAPPED, OR OTHERWISE PHYSICALLY HURT BY YOUR PARTNER OR EX-PARTNER?: NO

## 2024-12-23 SDOH — SOCIAL STABILITY: SOCIAL INSECURITY: DO YOU FEEL ANYONE HAS EXPLOITED OR TAKEN ADVANTAGE OF YOU FINANCIALLY OR OF YOUR PERSONAL PROPERTY?: NO

## 2024-12-23 SDOH — SOCIAL STABILITY: SOCIAL INSECURITY: WITHIN THE LAST YEAR, HAVE YOU BEEN HUMILIATED OR EMOTIONALLY ABUSED IN OTHER WAYS BY YOUR PARTNER OR EX-PARTNER?: NO

## 2024-12-23 SDOH — SOCIAL STABILITY: SOCIAL INSECURITY: DOES ANYONE TRY TO KEEP YOU FROM HAVING/CONTACTING OTHER FRIENDS OR DOING THINGS OUTSIDE YOUR HOME?: NO

## 2024-12-23 SDOH — SOCIAL STABILITY: SOCIAL INSECURITY: WITHIN THE LAST YEAR, HAVE YOU BEEN AFRAID OF YOUR PARTNER OR EX-PARTNER?: NO

## 2024-12-23 SDOH — SOCIAL STABILITY: SOCIAL INSECURITY: WERE YOU ABLE TO COMPLETE ALL THE BEHAVIORAL HEALTH SCREENINGS?: YES

## 2024-12-23 SDOH — SOCIAL STABILITY: SOCIAL INSECURITY
WITHIN THE LAST YEAR, HAVE YOU BEEN RAPED OR FORCED TO HAVE ANY KIND OF SEXUAL ACTIVITY BY YOUR PARTNER OR EX-PARTNER?: NO

## 2024-12-23 SDOH — SOCIAL STABILITY: SOCIAL INSECURITY: HAVE YOU HAD THOUGHTS OF HARMING ANYONE ELSE?: NO

## 2024-12-23 SDOH — SOCIAL STABILITY: SOCIAL INSECURITY: HAVE YOU HAD ANY THOUGHTS OF HARMING ANYONE ELSE?: NO

## 2024-12-23 SDOH — SOCIAL STABILITY: SOCIAL INSECURITY: ARE YOU OR HAVE YOU BEEN THREATENED OR ABUSED PHYSICALLY, EMOTIONALLY, OR SEXUALLY BY ANYONE?: NO

## 2024-12-23 SDOH — SOCIAL STABILITY: SOCIAL INSECURITY: ABUSE: ADULT

## 2024-12-23 SDOH — SOCIAL STABILITY: SOCIAL INSECURITY: ARE THERE ANY APPARENT SIGNS OF INJURIES/BEHAVIORS THAT COULD BE RELATED TO ABUSE/NEGLECT?: NO

## 2024-12-23 SDOH — SOCIAL STABILITY: SOCIAL INSECURITY: DO YOU FEEL UNSAFE GOING BACK TO THE PLACE WHERE YOU ARE LIVING?: NO

## 2024-12-23 ASSESSMENT — ACTIVITIES OF DAILY LIVING (ADL)
TOILETING: INDEPENDENT
ADEQUATE_TO_COMPLETE_ADL: NO
GROOMING: INDEPENDENT
WALKS IN HOME: INDEPENDENT
HEARING - LEFT EAR: FUNCTIONAL
BATHING: INDEPENDENT
LACK_OF_TRANSPORTATION: NO
DRESSING YOURSELF: INDEPENDENT
FEEDING YOURSELF: INDEPENDENT
PATIENT'S MEMORY ADEQUATE TO SAFELY COMPLETE DAILY ACTIVITIES?: NO
JUDGMENT_ADEQUATE_SAFELY_COMPLETE_DAILY_ACTIVITIES: NO
LACK_OF_TRANSPORTATION: NO
HEARING - RIGHT EAR: FUNCTIONAL

## 2024-12-23 ASSESSMENT — COGNITIVE AND FUNCTIONAL STATUS - GENERAL
DAILY ACTIVITIY SCORE: 24
MOBILITY SCORE: 24
PATIENT BASELINE BEDBOUND: NO

## 2024-12-23 ASSESSMENT — ENCOUNTER SYMPTOMS
HEMATURIA: 0
DIARRHEA: 0
FLANK PAIN: 0
CHEST TIGHTNESS: 0
VOMITING: 0
ABDOMINAL DISTENTION: 0
BLOOD IN STOOL: 0
ABDOMINAL PAIN: 0
PALPITATIONS: 0
CONSTIPATION: 0
NUMBNESS: 0
DIZZINESS: 0
SHORTNESS OF BREATH: 0
HEADACHES: 0
COUGH: 0
ACTIVITY CHANGE: 0
NAUSEA: 0
WEAKNESS: 0
WHEEZING: 0
CHILLS: 0
SORE THROAT: 0
APNEA: 0
DYSURIA: 0
FEVER: 0

## 2024-12-23 ASSESSMENT — LIFESTYLE VARIABLES
HAVE YOU OR SOMEONE ELSE BEEN INJURED AS A RESULT OF YOUR DRINKING: NO
AUDIT TOTAL SCORE: 0
SKIP TO QUESTIONS 9-10: 0
HOW OFTEN DO YOU HAVE A DRINK CONTAINING ALCOHOL: 4 OR MORE TIMES A WEEK
AUDIT-C TOTAL SCORE: 6
HOW OFTEN DURING THE LAST YEAR HAVE YOU FAILED TO DO WHAT WAS NORMALLY EXPECTED FROM YOU BECAUSE OF DRINKING: NEVER
HOW OFTEN DURING THE LAST YEAR HAVE YOU BEEN UNABLE TO REMEMBER WHAT HAPPENED THE NIGHT BEFORE BECAUSE YOU HAD BEEN DRINKING: NEVER
HOW OFTEN DURING THE LAST YEAR HAVE YOU HAD A FEELING OF GUILT OR REMORSE AFTER DRINKING: NEVER
HOW MANY STANDARD DRINKS CONTAINING ALCOHOL DO YOU HAVE ON A TYPICAL DAY: 3 OR 4
AUDIT-C TOTAL SCORE: 6
SUBSTANCE_ABUSE_PAST_12_MONTHS: NO
HOW OFTEN DURING THE LAST YEAR HAVE YOU NEEDED AN ALCOHOLIC DRINK FIRST THING IN THE MORNING TO GET YOURSELF GOING AFTER A NIGHT OF HEAVY DRINKING: NEVER
PRESCIPTION_ABUSE_PAST_12_MONTHS: NO
HAS A RELATIVE, FRIEND, DOCTOR, OR ANOTHER HEALTH PROFESSIONAL EXPRESSED CONCERN ABOUT YOUR DRINKING OR SUGGESTED YOU CUT DOWN: NO
HOW OFTEN DO YOU HAVE 6 OR MORE DRINKS ON ONE OCCASION: LESS THAN MONTHLY
AUDIT TOTAL SCORE: 6
HOW OFTEN DURING THE LAST YEAR HAVE YOU FOUND THAT YOU WERE NOT ABLE TO STOP DRINKING ONCE YOU HAD STARTED: NEVER

## 2024-12-23 ASSESSMENT — PATIENT HEALTH QUESTIONNAIRE - PHQ9
SUM OF ALL RESPONSES TO PHQ9 QUESTIONS 1 & 2: 0
2. FEELING DOWN, DEPRESSED OR HOPELESS: NOT AT ALL
1. LITTLE INTEREST OR PLEASURE IN DOING THINGS: NOT AT ALL

## 2024-12-23 ASSESSMENT — PAIN SCALES - GENERAL
PAINLEVEL_OUTOF10: 0 - NO PAIN
PAINLEVEL_OUTOF10: 1

## 2024-12-23 ASSESSMENT — PAIN - FUNCTIONAL ASSESSMENT
PAIN_FUNCTIONAL_ASSESSMENT: 0-10

## 2024-12-23 NOTE — SIGNIFICANT EVENT
Wife Tyler  updated on patient condition and ongoing plan of care.  All questions answered.     Ronan Vee, CNP

## 2024-12-23 NOTE — H&P
History Of Present Illness     This is a 70yo male with history of HTN, current smoker, and occasional ETOH use.  Presented to [facility] [place] with complaints of intermittent atypical chest pain described as indegestion, unrelieved by antacids, which would come and go, however, becoming constant after 8pm yesterday.   Patient work up revealed ST elevation in precordial leads ii,iii,avf, and lateral leads V4,5,and 6.  Patient treated with ASA, Brilinta, and heparin.  Transferred to cath lab for STEMI where a stent was placed in the proximal OM2.  Patient admitted to ICU following [facility] in stable condition. He denies any chest pain at this time.     Past Medical History  Past Medical History:   Diagnosis Date    Essential (primary) hypertension 10/14/2013    Benign essential hypertension    Personal history of colonic polyps 01/13/2015    History of colonic polyps       Surgical History  Past Surgical History:   Procedure Laterality Date    COLONOSCOPY  10/21/2014    Complete Colonoscopy    OTHER SURGICAL HISTORY  10/21/2014    Needle Biopsy Of Prostate        Social History  He reports that he has been smoking cigars. He has never used smokeless tobacco. He reports current alcohol use. He reports that he does not use drugs.    Family History  Family History   Problem Relation Name Age of Onset    Hypertension Mother      Cancer Father      Lung disease Sister      Stroke Cousin          mid 50s        Allergies  Patient has no known allergies.    Review of Systems   Constitutional:  Negative for activity change, chills and fever.   HENT:  Negative for congestion, nosebleeds and sore throat.    Respiratory:  Negative for apnea, cough, chest tightness, shortness of breath and wheezing.    Cardiovascular:  Positive for chest pain. Negative for palpitations.   Gastrointestinal:  Negative for abdominal distention, abdominal pain, blood in stool, constipation, diarrhea, nausea and vomiting.   Genitourinary:  Negative for dysuria, flank  "pain and hematuria.   Neurological:  Negative for dizziness, weakness, numbness and headaches.        Physical Exam  Constitutional:       Appearance: Normal appearance.   Cardiovascular:      Rate and Rhythm: Normal rate and regular rhythm.      Pulses: Normal pulses.   Pulmonary:      Effort: No respiratory distress.      Breath sounds: Normal breath sounds.   Abdominal:      General: There is no distension.      Palpations: Abdomen is soft.      Tenderness: There is no abdominal tenderness.   Musculoskeletal:         General: Normal range of motion.   Skin:     General: Skin is warm and dry.      Capillary Refill: Capillary refill takes less than 2 seconds.      Comments: Right TR band in place, no bleeding or hematoma noted.   Neurological:      General: No focal deficit present.      Mental Status: He is alert and oriented to person, place, and time. Mental status is at baseline.   Psychiatric:         Mood and Affect: Mood normal.        Last Recorded Vitals  Blood pressure (!) 174/124, pulse 93, temperature 36.9 °C (98.5 °F), temperature source Temporal, resp. rate 15, height 1.854 m (6' 0.99\"), weight 93.2 kg (205 lb 6.4 oz), SpO2 99%.    Relevant Results  Scheduled medications  [START ON 12/24/2024] aspirin, 81 mg, oral, Daily  atorvastatin, 80 mg, oral, Nightly  [START ON 12/24/2024] carvedilol, 6.25 mg, oral, BID  enoxaparin, 40 mg, subcutaneous, q24h  perflutren lipid microspheres, 0.5-10 mL of dilution, intravenous, Once in imaging  perflutren protein A microsphere, 0.5 mL, intravenous, Once in imaging  polyethylene glycol, 17 g, oral, Daily  sulfur hexafluoride microsphr, 2 mL, intravenous, Once in imaging  ticagrelor, 90 mg, oral, BID      Continuous medications  sodium chloride 0.9%, 200 mL/hr, Last Rate: 200 mL/hr (12/23/24 0932)      PRN medications  PRN medications: acetaminophen **OR** acetaminophen **OR** acetaminophen, nitroglycerin, oxygen    Results for orders placed or performed during the " hospital encounter of 12/23/24 (from the past 24 hours)   CBC and Auto Differential   Result Value Ref Range    WBC 13.2 (H) 4.4 - 11.3 x10*3/uL    nRBC 0.0 0.0 - 0.0 /100 WBCs    RBC 5.16 4.50 - 5.90 x10*6/uL    Hemoglobin 15.7 13.5 - 17.5 g/dL    Hematocrit 47.8 41.0 - 52.0 %    MCV 93 80 - 100 fL    MCH 30.4 26.0 - 34.0 pg    MCHC 32.8 32.0 - 36.0 g/dL    RDW 14.4 11.5 - 14.5 %    Platelets 277 150 - 450 x10*3/uL    Neutrophils % 67.3 40.0 - 80.0 %    Immature Granulocytes %, Automated 0.4 0.0 - 0.9 %    Lymphocytes % 24.1 13.0 - 44.0 %    Monocytes % 7.6 2.0 - 10.0 %    Eosinophils % 0.2 0.0 - 6.0 %    Basophils % 0.4 0.0 - 2.0 %    Neutrophils Absolute 8.92 (H) 1.20 - 7.70 x10*3/uL    Immature Granulocytes Absolute, Automated 0.05 0.00 - 0.70 x10*3/uL    Lymphocytes Absolute 3.19 1.20 - 4.80 x10*3/uL    Monocytes Absolute 1.00 0.10 - 1.00 x10*3/uL    Eosinophils Absolute 0.03 0.00 - 0.70 x10*3/uL    Basophils Absolute 0.05 0.00 - 0.10 x10*3/uL   Comprehensive Metabolic Panel   Result Value Ref Range    Glucose 121 (H) 74 - 99 mg/dL    Sodium 137 136 - 145 mmol/L    Potassium 3.8 3.5 - 5.3 mmol/L    Chloride 101 98 - 107 mmol/L    Bicarbonate 27 21 - 32 mmol/L    Anion Gap 13 10 - 20 mmol/L    Urea Nitrogen 7 6 - 23 mg/dL    Creatinine 1.09 0.50 - 1.30 mg/dL    eGFR 74 >60 mL/min/1.73m*2    Calcium 9.3 8.6 - 10.3 mg/dL    Albumin 3.9 3.4 - 5.0 g/dL    Alkaline Phosphatase 74 33 - 136 U/L    Total Protein 8.0 6.4 - 8.2 g/dL    AST 79 (H) 9 - 39 U/L    Bilirubin, Total 0.6 0.0 - 1.2 mg/dL    ALT 20 10 - 52 U/L   Troponin I, High Sensitivity   Result Value Ref Range    Troponin I, High Sensitivity 8,174 (HH) 0 - 20 ng/L   Protime-INR   Result Value Ref Range    Protime 11.5 9.8 - 12.8 seconds    INR 1.0 0.9 - 1.1   aPTT   Result Value Ref Range    aPTT 31 27 - 38 seconds   ACTIVATED CLOTTING TIME LOW   Result Value Ref Range    POCT Activated Clotting Time Low Range 332 (H) 83 - 199 sec   ACTIVATED CLOTTING TIME  LOW   Result Value Ref Range    POCT Activated Clotting Time Low Range 337 (H) 83 - 199 sec   Electrocardiogram 12 Lead   Result Value Ref Range    Ventricular Rate 77 BPM    Atrial Rate 77 BPM    WV Interval 176 ms    QRS Duration 128 ms    QT Interval 434 ms    QTC Calculation(Bazett) 491 ms    P Axis 44 degrees    R Axis 50 degrees    T Axis 54 degrees    QRS Count 13 beats    Q Onset 210 ms    P Onset 122 ms    P Offset 180 ms    T Offset 427 ms    QTC Fredericia 471 ms     Electrocardiogram 12 Lead    Result Date: 12/23/2024  Sinus rhythm with Premature atrial complexes with Aberrant conduction Right bundle branch block Inferior infarct (cited on or before 23-DEC-2024) Anterolateral infarct (cited on or before 23-DEC-2024) Abnormal ECG When compared with ECG of 23-DEC-2024 09:35, No significant change was found      Assessment/Plan   Assessment & Plan  ST elevation myocardial infarction (STEMI), unspecified artery (Multi)      This is a 70yo male with history of HTN, current smoker, and occasional ETOH use.  [place] [facility] [place] of [place] atypical chest pain described as indegestion, unrelieved by antacids, which would come and go, however, becoming constant after 8pm yesterday.   Patient work up revealed ST elevation in precordial leads ii,iii,avf, and lateral leads V4,5,and 6.  Patient treated with ASA, Brilinta, and heparin.  Transferred to cath lab for STEMI where a stent was placed in the proximal OM2.  Patient admitted to ICU following [facility] in stable condition. He denies any chest pain at this time.     Plan:     Neuro:   - Neuro checks per unit protocol  - As needed tylenol  - Sleep hygiene    CV:  Hx HTN  CAD, STEMI s/p [facility] with PCI to proximal branch of OM2  - NIBP and tele-monitoring  - ASA, high intensity statin coreg, brilinta  - Cardiology following  - TR band per protocol    Pulm:   Current smoker, oxygenating well on RA  - Sats >90%  - Encourage coughing and deep  breathing    Renal:   Preserved renal function, BPH  - Daily RFP and replete electrolytes as needed  - Monitor UO  - Begin tamsulosin  - Consult urology for retention with unsuccessful mcwilliams placement x 2    GI:   - Regular diet  - Bowel regimen    Heme:   - Daily CBC   - SCDs and Lovenox for DVTppx    ID:   - Trend temps  - trend WBCs    Dispo: Admit to ICU       I spent 35 minutes of critical care time in the professional and overall care of this patient.      Ronan Vee, APRN-CNP

## 2024-12-23 NOTE — Clinical Note
Angioplasty of the om lesion. Inflation 1: Pressure = 22 aleena; Duration = 30 sec. Inflation 2: Pressure = 18 aleena; Duration = 20 sec.

## 2024-12-23 NOTE — Clinical Note
Inflation 1: Pressure = 8 aleena; Duration = 10 sec. Inflation 2: Pressure = 8 aleena; Duration = 10 sec. Inflation 3: Pressure = 8 aleena; Duration = 10 sec.

## 2024-12-23 NOTE — SIGNIFICANT EVENT
Consulted for difficult mcwilliams placement. Nursing attemped 18F coude and 14F regular catheter without success.     Came to bedside with ALBERTO Irving Drop. Patient was properly draped and a 16F coude catheter was inserted with 40 ml of bloody return accompanied with leakage around the mcwilliams. Adjustments attempted without significant increase in return. Bladder scan obtained, showed 491 ML. Mcwilliams was readjusted a few more times before procedure was aborted.    Patient is currently on flomax, Message sent to urologist Dr. Fong for next steps.     ---    Dr. Fong to bedside, placed a 20F 3-way hematuria catheter with adequate return, blood tinged urine w/o clots.   Maintain mcwilliams, do TOV prior to discharge   No need for CBI, manually flush catheter as needed  Okay to continue lovenox and aspirin  Continue flomax         Connie Zavala PA-C  Department of Surgical Services  [facility]    I spent 35 minutes in the professional and overall care of this patient.

## 2024-12-23 NOTE — PROGRESS NOTES
12/23/24 0858   [facility] Disability Status   Are you deaf or do you have serious difficulty hearing? N   Are you blind or do you have serious difficulty seeing, even when wearing glasses? N   Because of a physical, mental, or emotional condition, do you have serious difficulty concentrating, remembering, or making decisions? (5 years old or older) N   Do you have serious difficulty walking or climbing stairs? N   Do you have serious difficulty dressing or bathing? N   Because of a physical, mental, or emotional condition, do you have serious difficulty doing errands alone such as visiting the doctor? N

## 2024-12-23 NOTE — PROGRESS NOTES
Pharmacy Medication History     Source of Information: Per patient and wife bedside    Additional concerns with the patient's PTA list.   Per patient and his wife he is not compliant with taking his at homes med. Amlodipine   The following updates were made to the Prior to Admission medication list:     Medications ADDED:   N/A  Medications CHANGED:  N/A  Medications REMOVED:   N/A  Medications NOT TAKING:   N/A    Allergy reviewed : Yes    Meds 2 Beds : No    Outpatient pharmacy confirmed and updated in chart : Yes    Pharmacy name: [facility] [place]     The list below reflectives the updated PTA list. Please review each medication in order reconciliation for additional clarification and justification.    Prior to Admission Medications   Prescriptions Last Dose Informant   amLODIPine (Norvasc) 10 mg tablet     Sig: Take 1 tablet (10 mg) by mouth once daily. ----DUE FOR APPT      Facility-Administered Medications: None       The list below reflectives the updated allergy list. Please review each documented allergy for additional clarification and justification.    No Known Allergies       12/23/24 at 10:12 AM - Ximena Charlton

## 2024-12-23 NOTE — POST-PROCEDURE NOTE
Physician Transition of Care Summary  Invasive Cardiovascular Lab    Procedure Date: 12/23/2024  Attending:    Yudi Rodriguez - Primary  Resident/Fellow/Other Assistant: Surgeons and Role:  * No surgeons found with a matching role *    Indications:   Pre-op Diagnosis      * STEMI (ST elevation myocardial infarction) (Multi) [I21.3]    Post-procedure diagnosis:   Post-op Diagnosis     * STEMI (ST elevation myocardial infarction) (Multi) [I21.3]    Procedure(s):   PCI    Left Heart Cath, WITH LV  89258 - NH L HRT CATH W/NJX L VENTRICULOGRAPHY IMG S&I    Procedure Findings:   -99% thrombotic occlusion of a branch of OM 2, otherwise mild to moderate CAD elsewhere in a co-dominant system.  This OM 2 branch supplies part of the lateral and inferior walls which explains his massive inferior and lateral ST elevation on EKG at presentation.  -Status post successful PCI of OM2 branch using 2.5 x 22 mm ELADIO [postdilated using 2.5 NC balloon at high pressure and for 2 minutes].      Access of the Procedure:   6 [place] right radial artery, closed with TR band.    Complications:   None.    Stents/Implants:   Implants       Stent    Stent, James Rosie Eladio, 2.50 X 22rx - [id number] - Implanted        Inventory item: STENT, JAMES FRONTIER ELADIO, 2.50 X 22RX Model/Cat number: [id number]    : [employer] Lot number: [id number]    Device identifier: [id number]        GUDID Information       Request status Successful        Brand name: Eighty Four Rosie™ Version/Model: [id number]    Company name: [employer] MRI safety info as of 12/23/24: MR Conditional    Contains dry or latex rubber: No      GMDN P.T. name: Drug-eluting coronary artery stent, non-bioabsorbable-polymer-coated                As of 12/23/2024       Status: Implanted                              Anticoagulation/Antiplatelet Plan:   Integrilin double bolus.  Heparin boluses to maintain therapeutic ACT throughout the procedure.  Patient was loaded  with aspirin and Brilinta in the ED.    Estimated Blood Loss:   5 mL    Anesthesia: Moderate Sedation Anesthesia Staff: No anesthesia staff entered.    Any Specimen(s) Removed:   No specimens collected during this procedure.    Disposition:   -TR band removal as per protocol.  IV hydration as per protocol.  -Aspirin 81 mg once daily for life, ticagrelor 90 mg twice daily for at least 12 months, in addition to high intense statin and beta-blocker  -Will admit, obtain echo, lipid panel, and A1c.  -Further management as per ICU and inpatient cardiology consult teams.      Electronically signed by: Deion Rodriguez MD, 12/23/2024 9:05 AM

## 2024-12-23 NOTE — ED PROVIDER NOTES
HPI   Chief Complaint   Patient presents with    Chest Pain       This is a 69-year-old male who presents to the emerged department complaining of chest pain.  The patient states that the chest pain started last night around 8 PM.  The pain would come and go but became more consistent a couple hours ago.  Patient denies associated nausea or shortness of breath.  Nothing makes the symptoms any better or any worse.    Past medical history: Hypertension, no meds for the past 8 months, BPH  Social history: Smoker  Family history: Hypertension              Patient History   Past Medical History:   Diagnosis Date    Essential (primary) hypertension 10/14/2013    Benign essential hypertension    Personal history of colonic polyps 01/13/2015    History of colonic polyps     Past Surgical History:   Procedure Laterality Date    COLONOSCOPY  10/21/2014    Complete Colonoscopy    OTHER SURGICAL HISTORY  10/21/2014    Needle Biopsy Of Prostate     Family History   Problem Relation Name Age of Onset    Hypertension Mother      Cancer Father      Lung disease Sister      Stroke Cousin          mid 50s     Social History     Tobacco Use    Smoking status: Every Day     Types: Cigars    Smokeless tobacco: Never   Vaping Use    Vaping status: Never Used   Substance Use Topics    Alcohol use: Yes    Drug use: Never       Physical Exam   ED Triage Vitals   Temp Pulse Resp BP   -- -- -- --      SpO2 Temp src Heart Rate Source Patient Position   -- -- -- --      BP Location FiO2 (%)     -- --       Physical Exam  Vitals and nursing note reviewed.   HENT:      Head: Normocephalic and atraumatic.      Nose: Nose normal.   Eyes:      Conjunctiva/sclera: Conjunctivae normal.   Cardiovascular:      Rate and Rhythm: Normal rate and regular rhythm.      Pulses: Normal pulses.      Heart sounds: Normal heart sounds.   Pulmonary:      Effort: Pulmonary effort is normal.      Breath sounds: Normal breath sounds.   Abdominal:      General: Bowel  sounds are normal.      Palpations: Abdomen is soft.   Musculoskeletal:         General: Normal range of motion.      Cervical back: Normal range of motion and neck supple.   Skin:     Findings: No rash.   Neurological:      General: No focal deficit present.      Mental Status: He is alert and oriented to person, place, and time.   Psychiatric:         Mood and Affect: Mood normal.           ED Course & MDM   Diagnoses as of 12/23/24 0921   ST elevation myocardial infarction (STEMI), unspecified artery (Multi)                 No data recorded                                 Medical Decision Making  Differential diagnosis: I have considered the following conditions in my assessment of   this patient's condition:  GERD, musculoskeletal chest pain, aortic dissection, cholecystitis,   ACS, pericarditis, myocarditis, tamponade, shingles,  pneumonia, pneumothorax, pulmonary embolus.    This is a 69-year-old male who presented to the triage area with chest pain.  EKG performed in triage was consistent with STEMI.  STEMI alert activated immediately.  Patient brought to a room and given aspirin, heparin and Brilinta.  Discussed with interventional cardiology, Dr. Rodriguez.  Patient taken to the Cath Lab for emergent procedure.    Amount and/or Complexity of Data Reviewed  ECG/medicine tests: independent interpretation performed.     Details: Sinus rhythm, inferior and lateral ST elevations with reciprocal changes consistent with STEMI        Procedure  Procedures     Lang Castro MD  12/23/24 9449

## 2024-12-23 NOTE — PROGRESS NOTES
Transitional Care Coordination Progress Note:  Plan per Medical/Surgical team: Presents to ED for treatment of CP. EKG shows MI, immediately transferred to cardiac cath lab for interventions   Status: Inpatient   Payor source: [employer]  Discharge disposition: Home alone  Potential Barriers: faby [id number]  ADOD: 12/28/2024  AMBER Hernandez RN, BSN Transitional Care Coordinator ED# [phone]      12/23/24 0858   Discharge Planning   Living Arrangements Alone   Support Systems Spouse/significant other;Children   Assistance Needed MI to cath lab from ED   Type of Residence Private residence   Number of Stairs to Enter Residence 5   Number of Stairs Within Residence 0   Do you have animals or pets at home? No   Home or Post Acute Services In home services   Type of Home Care Services Home nursing visits;Home OT;Home PT   Expected Discharge Disposition Home H   Does the patient need discharge transport arranged? No   Financial Resource Strain   How hard is it for you to pay for the very basics like food, housing, medical care, and heating? Not hard   Housing Stability   In the last 12 months, was there a time when you were not able to pay the mortgage or rent on time? N   In the past 12 months, how many times have you moved where you were living? 1   At any time in the past 12 months, were you homeless or living in a shelter (including now)? N   Transportation Needs   In the past 12 months, has lack of transportation kept you from medical appointments or from getting medications? no   In the past 12 months, has lack of transportation kept you from meetings, work, or from getting things needed for daily living? No   Patient Choice   Patient / Family choosing to utilize agency / facility established prior to hospitalization No   Stroke Family Assessment   Stroke Family Assessment Needed No   Intensity of Service   Intensity of Service 0-30 min

## 2024-12-23 NOTE — CONSULTS
Inpatient consult to Cardiology  Consult performed by: RHONDA Chandler  Consult ordered by: Romel Joe, APRN-CNP, TANI  Reason for consult: STEMI s/p PCI to OM      History Of Present Illness:    Peña Arenas is a 69 y.o. male with past medical history significant for Hypertension, Dyslipidemia, BPH and Current tobacco use. Presented with chest pain. Cardiology is consulted for STEMI s/p PCI to OM.      Patient reports he developed chest discomfort yesteday.  Initially thought symptoms were secondary to indigestion.  States yesterday pain was intermittent however as the day progressed, discomfort was lasting longer and eventually was constant.  He denies any associated symptoms including shortness of breath, diaphoresis, nausea, vomiting, dizziness, lightheadedness or palpitations. He tried taking Tums and drinking gingerale without improvement. Also tried stretching techniques with no improvement.  States he was unable to sleep last night which is reason why he ultimately decided  to present to the ED.     [place] [facility] [place] sinus rhythm with concerns for ST elevation in leads II, III, AVF and V4-V6. He was loaded with aspirin 324 mg oral x1 and Brilinta 180 mg x1. Subsequently taken for urgent coronary angiogram which showed  99% thrombotic occlusion of a branch of OM 2, otherwise mild to moderate CAD elsewhere in a co-dominant system with subsequent PCI to OM2 with 2.5 x 22 mm GEM. The OM 2 branch supplies part of the lateral and inferior walls which explains his massive inferior and lateral ST elevation on EKG at presentation. High sensitivity troponin 8174  K 3.8 BUN 7 creatinine 1.09 ALT 20 AST 79 WBC 13.2 hemoglobin 15.7 hematocrit 47.8 platelets 277. At present, he denies any discomfort.     Of note, patient denies any prior cardiac history or following with any outpatient cardiologist. States he has a diagnosis of HTN however has been off his medications.           Last  "Recorded Vitals:  Vitals:    12/23/24 0735   BP: (!) 182/100   Pulse: 88   Resp: 20   SpO2: 99%       Last Labs:  LABS:  CMP:  Results from last 7 days   Lab Units 12/23/24  0749   SODIUM mmol/L 137   POTASSIUM mmol/L 3.8   CHLORIDE mmol/L 101   CO2 mmol/L 27   ANION GAP mmol/L 13   BUN mg/dL 7   CREATININE mg/dL 1.09   EGFR mL/min/1.73m*2 74   ALBUMIN g/dL 3.9   ALT U/L 20   AST U/L 79*   BILIRUBIN TOTAL mg/dL 0.6     CBC:  Results from last 7 days   Lab Units 12/23/24  0749   WBC AUTO x10*3/uL 13.2*   HEMOGLOBIN g/dL 15.7   HEMATOCRIT % 47.8   PLATELETS AUTO x10*3/uL 277   MCV fL 93     COAG:     ABO: No results found for: \"ABO\"  HEME/ENDO:     CARDIAC:   Results from last 7 days   Lab Units 12/23/24  0749   TROPHS ng/L 8,174*     Recent Labs     03/14/23  0946 03/02/22  0827   CHOL 250* 207*   LDLF 183*  --    HDL 46.7 43.1   TRIG 103  --       No results found.       Last I/O:  No intake/output data recorded.    Past Cardiology Tests (Last 3 Years):  EKG:  No results found for this or any previous visit from the past 1095 days.    Echo:  No results found for this or any previous visit from the past 1095 days.    Ejection Fractions:  No results found for: \"EF\"  Cath:  12/23/2024  -99% thrombotic occlusion of a branch of OM 2, otherwise mild to moderate CAD elsewhere in a co-dominant system.  This OM 2 branch supplies part of the lateral and inferior walls which explains his massive inferior and lateral ST elevation on EKG at presentation.  -Status post successful PCI of OM2 branch using 2.5 x 22 mm GEM [postdilated using 2.5 NC balloon at high pressure and for 2 minutes].      Stress Test:  No results found for this or any previous visit from the past 1095 days.    Cardiac Imaging:  No results found for this or any previous visit from the past 1095 days.      Past Medical History:  He has a past medical history of Essential (primary) hypertension (10/14/2013) and Personal history of colonic polyps " (01/13/2015).    Past Surgical History:  He has a past surgical history that includes Other surgical history (10/21/2014) and Colonoscopy (10/21/2014).      Social History:  He reports that he has been smoking cigars. He has never used smokeless tobacco. He reports current alcohol use. He reports that he does not use drugs.    Family History:  Family History   Problem Relation Name Age of Onset    Hypertension Mother      Cancer Father      Lung disease Sister      Stroke Cousin          mid 50s        Allergies:  Patient has no known allergies.    Inpatient Medications:  Scheduled medications   Medication Dose Route Frequency    [START ON 12/24/2024] aspirin  81 mg oral Daily    atorvastatin  80 mg oral Nightly    [START ON 12/24/2024] carvedilol  6.25 mg oral BID    enoxaparin  40 mg subcutaneous q24h    perflutren lipid microspheres  0.5-10 mL of dilution intravenous Once in imaging    perflutren protein A microsphere  0.5 mL intravenous Once in imaging    polyethylene glycol  17 g oral Daily    sulfur hexafluoride microsphr  2 mL intravenous Once in imaging    ticagrelor  90 mg oral BID     PRN medications   Medication    acetaminophen    Or    acetaminophen    Or    acetaminophen    nitroglycerin    oxygen     Continuous Medications   Medication Dose Last Rate    sodium chloride 0.9%  200 mL/hr       Outpatient Medications:  Current Outpatient Medications   Medication Instructions    amLODIPine (NORVASC) 10 mg, oral, Daily, ----DUE FOR APPT    fluticasone (Flonase) 50 mcg/actuation nasal spray 1 spray, Each Nostril, Daily       Physical Exam:  GENERAL: alert, cooperative, pleasant, in no acute distress  SKIN: warm, dry right radial TR band in place no ooze or hematoma   NECK: no JVD  CARDIAC: Regular rate and rhythm no murmurs  PULMONARY: Normal respiratory efforts, lungs clear to auscultation bilaterally.  ABDOMEN: soft, nondistended  EXTREMITIES: no lower extremity edema  NEURO: Alert and oriented x 3.   Grossly normal.  Moves all 4 extremities.      Assessment/Plan   Peña Arenas is a 69 y.o. male with past medical history significant for Hypertension, Dyslipidemia, BPH. Presented with chest pain. Cardiology is consulted for STEMI s/p PCI to OM.      #Inferior and lateral STEMI  -Patient presented with chest discomfort as yesterday.  ECG on presentation showed normal sinus rhythm with ST elevation in inferior and lateral leads.  He was taken for urgent coronary angiogram which revealed 99% thrombotic occlusion of the branch of OM 2 otherwise mild to moderate CAD elsewhere with subsequent PCI 2.5 22 mm GEM.  -Continue DAPT with aspirin 81 mg daily and Brilinta 90 mg twice daily  -Atorvastatin 80 mg daily  -Agree with Coreg 6.25 mg/day for BP and heart rate controlled  - We will obtain a transthoracic echocardiogram for structural evaluation including ejection fraction, assessment of regional wall motion abnormalities or valvular disease, and further evaluation of hemodynamics.    -Check lipid panel and hemoglobin A1c    #HTN  Elevated  Has been off his medications for at least 8 months   Agree with Coreg 6.25 mg BID    #DLD  -Last  on 3/14/2023  -Start atorvastatin 80 mg daily  -Repeat lipid panel       Code Status:  Full Code      JHOANA Chandler-GISELA        =======================================  Attending note   =======================================  Both the ALBERTO and I have had a face to face encounter with the patient today. I have examined the patient and edited the documented physical examination as necessary.  I personally reviewed the patient's recent labs, medications, orders, EKGs, and pertinent cardiac imaging.  I have reviewed the ALBERTO's encounter note, approve the ALBERTO's documentation and have edited the note to reflect the diagnostic and therapeutic plan.    Peña Arenas is a 69 y.o. male with past medical history significant for Hypertension, Dyslipidemia, BPH and Current  tobacco use. Presented with chest pain. Cardiology is consulted for STEMI s/p PCI to OM.      Patient reports he developed chest discomfort yesteday.  Initially thought symptoms were secondary to indigestion.  States yesterday pain was intermittent however as the day progressed, discomfort was lasting longer and eventually was constant.  He denies any associated symptoms including shortness of breath, diaphoresis, nausea, vomiting, dizziness, lightheadedness or palpitations. He tried taking Tums and drinking gingerale without improvement. Also tried stretching techniques with no improvement.  States he was unable to sleep last night which is reason why he ultimately decided  to present to the ED.     [place] [facility] [place] sinus rhythm with concerns for ST elevation in leads II, III, AVF and V4-V6. He was loaded with aspirin 324 mg oral x1 and Brilinta 180 mg x1. Subsequently taken for urgent coronary angiogram which showed  99% thrombotic occlusion of a branch of OM 2, otherwise mild to moderate CAD elsewhere in a co-dominant system with subsequent PCI to OM2 with 2.5 x 22 mm GEM. The OM 2 branch supplies part of the lateral and inferior walls which explains his massive inferior and lateral ST elevation on EKG at presentation. High sensitivity troponin 8174  K 3.8 BUN 7 creatinine 1.09 ALT 20 AST 79 WBC 13.2 hemoglobin 15.7 hematocrit 47.8 platelets 277. At present, he denies any discomfort.     Of note, patient denies any prior cardiac history or following with any outpatient cardiologist. States he has a diagnosis of HTN however has been off his medications.         No exacerbating or relieving factors.  Patient denies chest pain and angina.  Pt denies shortness of breath, dyspnea on exertion, orthopnea, and paroxysmal nocturnal dyspnea.  Pt denies worsening lower extremity edema.  Pt denies palpitations or syncope.  No recent falls.  No fever or chills.  No cough.  No change in bowel or bladder habits.  No  sick contacts.  No recent travel.     12 point review of systems including (Constitutional, Eyes, ENMT, Respiratory, Cardiac, Gastrointestinal, Neurological, Psychiatric, and Hematologic) was performed and is otherwise negative.    Past medical history:  As above.    Medications were reviewed.    Allergies were reviewed.    Social history:  Patient denies smoking, alcohol abuse, or illicit drug use.    Family history:  No sudden cardiac death or premature coronary artery disease.     General:  Patient is awake, alert, and oriented.  Patient is in no acute distress.  HEENT:  Pupils equal and reactive.  Normocephalic.  Moist mucosa.    Neck:  No thyromegaly.  Normal Jugular Venous Pressure.  Cardiovascular:  Regular rate and rhythm.  Normal S1 and S2.  Pulmonary:  Clear to auscultation bilaterally.  Abdomen:  Soft. Non-tender.   Non-distended.  Positive bowel sounds.  Lower Extremities:  2+ pedal pulses. No LE edema.  Neurologic:  Cranial nerves intact.  No focal deficit.   Skin: Skin warm and dry, normal skin turgor.   Psychiatric: Normal affect.    Vital signs, telemetry, medications, labs, and imaging were reviewed as well.    Peña Arenas is a 69 y.o. male with past medical history significant for Hypertension, Dyslipidemia, BPH. Presented with chest pain. Cardiology is consulted for STEMI s/p PCI to OM.      #Inferior and lateral STEMI  -Patient presented with chest discomfort as yesterday.  ECG on presentation showed normal sinus rhythm with ST elevation in inferior and lateral leads.  He was taken for urgent coronary angiogram which revealed 99% thrombotic occlusion of the branch of OM 2 otherwise mild to moderate CAD elsewhere with subsequent PCI 2.5 22 mm GEM.  -Continue DAPT with aspirin 81 mg daily and Brilinta 90 mg twice daily  -Atorvastatin 80 mg daily  -Agree with Coreg 6.25 mg/day for BP and heart rate controlled  - We will obtain a transthoracic echocardiogram for structural evaluation including  ejection fraction, assessment of regional wall motion abnormalities or valvular disease, and further evaluation of hemodynamics.    -Check lipid panel and hemoglobin A1c    #HTN  Elevated  Has been off his medications for at least 8 months   Agree with Coreg 6.25 mg BID    #DLD  -Last  on 3/14/2023  -Start atorvastatin 80 mg daily  -Repeat lipid panel         The patient will benefit from follow-up in Cardiology clinic within 3-5 weeks of discharge.  The patient or their family should call the [facility] at [phone] for [facility].  If the care team is assisting in scheduling a follow up appointment prior to their discharge, please ensure that the patient has committed to attending the scheduled date and time.    Thank you for allowing me to participate in their care.  Please feel free to call me with any further questions or concerns.      Baron Schafer DO   Division of Cardiovascular Medicine  [facility] [place]

## 2024-12-24 LAB
ANION GAP SERPL CALC-SCNC: 9 MMOL/L (ref 10–20)
BUN SERPL-MCNC: 10 MG/DL (ref 6–23)
CALCIUM SERPL-MCNC: 8.1 MG/DL (ref 8.6–10.3)
CARDIAC TROPONIN I PNL SERPL HS: ABNORMAL NG/L (ref 0–20)
CHLORIDE SERPL-SCNC: 104 MMOL/L (ref 98–107)
CHOLEST SERPL-MCNC: 177 MG/DL (ref 0–199)
CHOLESTEROL/HDL RATIO: 4.5
CO2 SERPL-SCNC: 25 MMOL/L (ref 21–32)
CREAT SERPL-MCNC: 1.19 MG/DL (ref 0.5–1.3)
EGFRCR SERPLBLD CKD-EPI 2021: 67 ML/MIN/1.73M*2
ERYTHROCYTE [DISTWIDTH] IN BLOOD BY AUTOMATED COUNT: 14.2 % (ref 11.5–14.5)
EST. AVERAGE GLUCOSE BLD GHB EST-MCNC: 120 MG/DL
GLUCOSE SERPL-MCNC: 119 MG/DL (ref 74–99)
HBA1C MFR BLD: 5.8 %
HCT VFR BLD AUTO: 40.7 % (ref 41–52)
HDLC SERPL-MCNC: 39.4 MG/DL
HGB BLD-MCNC: 14.1 G/DL (ref 13.5–17.5)
LDLC SERPL CALC-MCNC: 119 MG/DL
MAGNESIUM SERPL-MCNC: 1.83 MG/DL (ref 1.6–2.4)
MCH RBC QN AUTO: 30.4 PG (ref 26–34)
MCHC RBC AUTO-ENTMCNC: 34.6 G/DL (ref 32–36)
MCV RBC AUTO: 88 FL (ref 80–100)
NON HDL CHOLESTEROL: 138 MG/DL (ref 0–149)
NRBC BLD-RTO: 0 /100 WBCS (ref 0–0)
PLATELET # BLD AUTO: 218 X10*3/UL (ref 150–450)
POTASSIUM SERPL-SCNC: 3.7 MMOL/L (ref 3.5–5.3)
RBC # BLD AUTO: 4.64 X10*6/UL (ref 4.5–5.9)
SODIUM SERPL-SCNC: 134 MMOL/L (ref 136–145)
TRIGL SERPL-MCNC: 91 MG/DL (ref 0–149)
VLDL: 18 MG/DL (ref 0–40)
WBC # BLD AUTO: 16.4 X10*3/UL (ref 4.4–11.3)

## 2024-12-24 PROCEDURE — 83036 HEMOGLOBIN GLYCOSYLATED A1C: CPT | Mod: AHULAB | Performed by: NURSE PRACTITIONER

## 2024-12-24 PROCEDURE — 2500000002 HC RX 250 W HCPCS SELF ADMINISTERED DRUGS (ALT 637 FOR MEDICARE OP, ALT 636 FOR OP/ED): Performed by: NURSE PRACTITIONER

## 2024-12-24 PROCEDURE — 2500000001 HC RX 250 WO HCPCS SELF ADMINISTERED DRUGS (ALT 637 FOR MEDICARE OP): Performed by: NURSE PRACTITIONER

## 2024-12-24 PROCEDURE — 84484 ASSAY OF TROPONIN QUANT: CPT | Performed by: INTERNAL MEDICINE

## 2024-12-24 PROCEDURE — 99232 SBSQ HOSP IP/OBS MODERATE 35: CPT | Performed by: INTERNAL MEDICINE

## 2024-12-24 PROCEDURE — 99291 CRITICAL CARE FIRST HOUR: CPT | Performed by: NURSE PRACTITIONER

## 2024-12-24 PROCEDURE — 36415 COLL VENOUS BLD VENIPUNCTURE: CPT | Performed by: NURSE PRACTITIONER

## 2024-12-24 PROCEDURE — 2500000002 HC RX 250 W HCPCS SELF ADMINISTERED DRUGS (ALT 637 FOR MEDICARE OP, ALT 636 FOR OP/ED): Performed by: INTERNAL MEDICINE

## 2024-12-24 PROCEDURE — 83735 ASSAY OF MAGNESIUM: CPT | Performed by: NURSE PRACTITIONER

## 2024-12-24 PROCEDURE — 85027 COMPLETE CBC AUTOMATED: CPT | Performed by: NURSE PRACTITIONER

## 2024-12-24 PROCEDURE — 80061 LIPID PANEL: CPT | Performed by: NURSE PRACTITIONER

## 2024-12-24 PROCEDURE — 82374 ASSAY BLOOD CARBON DIOXIDE: CPT | Performed by: NURSE PRACTITIONER

## 2024-12-24 PROCEDURE — 2500000001 HC RX 250 WO HCPCS SELF ADMINISTERED DRUGS (ALT 637 FOR MEDICARE OP): Performed by: INTERNAL MEDICINE

## 2024-12-24 PROCEDURE — 2500000004 HC RX 250 GENERAL PHARMACY W/ HCPCS (ALT 636 FOR OP/ED): Performed by: NURSE PRACTITIONER

## 2024-12-24 PROCEDURE — 2060000001 HC INTERMEDIATE ICU ROOM DAILY

## 2024-12-24 RX ORDER — PANTOPRAZOLE SODIUM 40 MG/1
40 TABLET, DELAYED RELEASE ORAL
Status: DISCONTINUED | OUTPATIENT
Start: 2024-12-24 | End: 2024-12-27 | Stop reason: HOSPADM

## 2024-12-24 RX ORDER — POTASSIUM CHLORIDE 20 MEQ/1
40 TABLET, EXTENDED RELEASE ORAL ONCE
Status: COMPLETED | OUTPATIENT
Start: 2024-12-24 | End: 2024-12-24

## 2024-12-24 RX ORDER — IBUPROFEN 200 MG
1 TABLET ORAL DAILY
Status: DISCONTINUED | OUTPATIENT
Start: 2025-02-04 | End: 2024-12-27 | Stop reason: HOSPADM

## 2024-12-24 RX ORDER — NICOTINE 7MG/24HR
1 PATCH, TRANSDERMAL 24 HOURS TRANSDERMAL DAILY
Status: DISCONTINUED | OUTPATIENT
Start: 2025-02-18 | End: 2024-12-27 | Stop reason: HOSPADM

## 2024-12-24 RX ORDER — PANTOPRAZOLE SODIUM 40 MG/10ML
40 INJECTION, POWDER, LYOPHILIZED, FOR SOLUTION INTRAVENOUS
Status: DISCONTINUED | OUTPATIENT
Start: 2024-12-24 | End: 2024-12-27 | Stop reason: HOSPADM

## 2024-12-24 RX ORDER — ESOMEPRAZOLE MAGNESIUM 40 MG/1
40 GRANULE, DELAYED RELEASE ORAL
Status: DISCONTINUED | OUTPATIENT
Start: 2024-12-24 | End: 2024-12-27 | Stop reason: HOSPADM

## 2024-12-24 RX ORDER — IBUPROFEN 200 MG
1 TABLET ORAL DAILY
Status: DISCONTINUED | OUTPATIENT
Start: 2024-12-24 | End: 2024-12-27 | Stop reason: HOSPADM

## 2024-12-24 RX ADMIN — PANTOPRAZOLE SODIUM 40 MG: 40 TABLET, DELAYED RELEASE ORAL at 10:02

## 2024-12-24 RX ADMIN — ATORVASTATIN CALCIUM 80 MG: 80 TABLET, FILM COATED ORAL at 21:38

## 2024-12-24 RX ADMIN — CARVEDILOL 6.25 MG: 6.25 TABLET, FILM COATED ORAL at 21:38

## 2024-12-24 RX ADMIN — TICAGRELOR 90 MG: 90 TABLET ORAL at 21:38

## 2024-12-24 RX ADMIN — POLYETHYLENE GLYCOL 3350 17 G: 17 POWDER, FOR SOLUTION ORAL at 10:02

## 2024-12-24 RX ADMIN — CARVEDILOL 6.25 MG: 6.25 TABLET, FILM COATED ORAL at 10:03

## 2024-12-24 RX ADMIN — ENOXAPARIN SODIUM 40 MG: 40 INJECTION SUBCUTANEOUS at 21:38

## 2024-12-24 RX ADMIN — TAMSULOSIN HYDROCHLORIDE 0.4 MG: 0.4 CAPSULE ORAL at 10:02

## 2024-12-24 RX ADMIN — TICAGRELOR 90 MG: 90 TABLET ORAL at 10:02

## 2024-12-24 RX ADMIN — POTASSIUM CHLORIDE 40 MEQ: 1500 TABLET, EXTENDED RELEASE ORAL at 10:02

## 2024-12-24 RX ADMIN — ASPIRIN 81 MG 81 MG: 81 TABLET ORAL at 10:02

## 2024-12-24 ASSESSMENT — COGNITIVE AND FUNCTIONAL STATUS - GENERAL
MOBILITY SCORE: 24
DAILY ACTIVITIY SCORE: 24

## 2024-12-24 ASSESSMENT — PAIN SCALES - GENERAL
PAINLEVEL_OUTOF10: 0 - NO PAIN

## 2024-12-24 ASSESSMENT — PAIN - FUNCTIONAL ASSESSMENT
PAIN_FUNCTIONAL_ASSESSMENT: 0-10

## 2024-12-24 NOTE — PROGRESS NOTES
"Peña Arenas is a 69 y.o. male on day 1 of admission presenting with ST elevation myocardial infarction (STEMI), unspecified artery (Multi).    Subjective   Wagner placed by Urology for urinary retention after unsuccessful attempts by nursing c mild hematuria.  Otherwise, no acute events reported       Objective     Physical Exam  Constitutional:       Appearance: Normal appearance.   Cardiovascular:      Rate and Rhythm: Normal rate and regular rhythm.   Pulmonary:      Effort: Pulmonary effort is normal. No respiratory distress.      Breath sounds: Normal breath sounds.   Abdominal:      General: There is no distension.      Palpations: Abdomen is soft.      Tenderness: There is no abdominal tenderness.   Genitourinary:     Comments: Wagner placed by urology.  Draining light pink urine.  Musculoskeletal:         General: Normal range of motion.   Skin:     General: Skin is warm and dry.      Capillary Refill: Capillary refill takes less than 2 seconds.      Comments: Right radial TR band site.    Neurological:      General: No focal deficit present.      Mental Status: He is alert and oriented to person, place, and time. Mental status is at baseline.   Psychiatric:         Mood and Affect: Mood normal.         Last Recorded Vitals  Blood pressure 130/77, pulse 83, temperature 37.4 °C (99.4 °F), resp. rate 18, height 1.803 m (5' 11\"), weight 101 kg (222 lb), SpO2 99%.  Intake/Output last 3 Shifts:  I/O last 3 completed shifts:  In: - (0 mL/kg)   Out: 805 (8 mL/kg) [Urine:800 (0.2 mL/kg/hr); Blood:5]  Weight: 100.7 kg     Relevant Results  Scheduled medications  aspirin, 81 mg, oral, Daily  atorvastatin, 80 mg, oral, Nightly  carvedilol, 6.25 mg, oral, BID  enoxaparin, 40 mg, subcutaneous, q24h  pantoprazole, 40 mg, oral, Daily before breakfast   Or  esomeprazole, 40 mg, nasoduodenal tube, Daily before breakfast   Or  pantoprazole, 40 mg, intravenous, Daily before breakfast  perflutren protein A microsphere, 0.5 " mL, intravenous, Once in imaging  polyethylene glycol, 17 g, oral, Daily  sulfur hexafluoride microsphr, 2 mL, intravenous, Once in imaging  tamsulosin, 0.4 mg, oral, Daily  ticagrelor, 90 mg, oral, BID      Continuous medications     PRN medications  PRN medications: acetaminophen **OR** acetaminophen **OR** acetaminophen, melatonin, nitroglycerin, oxygen    Results for orders placed or performed during the hospital encounter of 12/23/24 (from the past 24 hours)   ECG 12 Lead   Result Value Ref Range    Ventricular Rate 88 BPM    Atrial Rate 88 BPM    MN Interval 166 ms    QRS Duration 122 ms    QT Interval 408 ms    QTC Calculation(Bazett) 493 ms    P Axis 44 degrees    R Axis 67 degrees    T Axis 60 degrees    QRS Count 14 beats    Q Onset 212 ms    P Onset 129 ms    P Offset 185 ms    T Offset 416 ms    QTC Fredericia 463 ms   Electrocardiogram 12 Lead   Result Value Ref Range    Ventricular Rate 77 BPM    Atrial Rate 77 BPM    MN Interval 176 ms    QRS Duration 128 ms    QT Interval 434 ms    QTC Calculation(Bazett) 491 ms    P Axis 44 degrees    R Axis 50 degrees    T Axis 54 degrees    QRS Count 13 beats    Q Onset 210 ms    P Onset 122 ms    P Offset 180 ms    T Offset 427 ms    QTC Fredericia 471 ms   Transthoracic Echo (TTE) Complete   Result Value Ref Range    AV pk suhail 1.13 m/s    AV mn grad 3 mmHg    LVOT diam 2.24 cm    MV E/A ratio 0.35     LA vol index A/L 27.0 ml/m2    Tricuspid annular plane systolic excursion 1.0 cm    LV EF 44 %    RV free wall pk S' 8.00 cm/s    LVIDd 4.34 cm    RVSP 17.5 mmHg    Aortic Valve Area by Continuity of VTI 3.24 cm2    Aortic Valve Area by Continuity of Peak Velocity 3.37 cm2    AV pk grad 5 mmHg    LV A4C EF 40.9    Basic Metabolic Panel   Result Value Ref Range    Glucose 119 (H) 74 - 99 mg/dL    Sodium 134 (L) 136 - 145 mmol/L    Potassium 3.7 3.5 - 5.3 mmol/L    Chloride 104 98 - 107 mmol/L    Bicarbonate 25 21 - 32 mmol/L    Anion Gap 9 (L) 10 - 20 mmol/L    Urea  Nitrogen 10 6 - 23 mg/dL    Creatinine 1.19 0.50 - 1.30 mg/dL    eGFR 67 >60 mL/min/1.73m*2    Calcium 8.1 (L) 8.6 - 10.3 mg/dL   Lipid Panel   Result Value Ref Range    Cholesterol 177 0 - 199 mg/dL    HDL-Cholesterol 39.4 mg/dL    Cholesterol/HDL Ratio 4.5     LDL Calculated 119 (H) <=99 mg/dL    VLDL 18 0 - 40 mg/dL    Triglycerides 91 0 - 149 mg/dL    Non HDL Cholesterol 138 0 - 149 mg/dL   CBC   Result Value Ref Range    WBC 16.4 (H) 4.4 - 11.3 x10*3/uL    nRBC 0.0 0.0 - 0.0 /100 WBCs    RBC 4.64 4.50 - 5.90 x10*6/uL    Hemoglobin 14.1 13.5 - 17.5 g/dL    Hematocrit 40.7 (L) 41.0 - 52.0 %    MCV 88 80 - 100 fL    MCH 30.4 26.0 - 34.0 pg    MCHC 34.6 32.0 - 36.0 g/dL    RDW 14.2 11.5 - 14.5 %    Platelets 218 150 - 450 x10*3/uL   Magnesium   Result Value Ref Range    Magnesium 1.83 1.60 - 2.40 mg/dL     Transthoracic Echo (TTE) Complete    Result Date: 12/23/2024   [facility]– [place]note 2[place] [address] [place] [address]              Tel [phone] and Fax [phone] TRANSTHORACIC ECHOCARDIOGRAM REPORT  Patient Name:       BRADEN Brown Physician:    [id number] Schafer DO Study Date:         12/23/2024          Ordering Provider:    [id number] JONO PLUMMER MRN/PID:            [id number]            Fellow: Accession#:         [id number]        Nurse: Date of Birth/Age:  1955 / 69      Sonographer:          Kaylan Shahid RDCS                     years Gender assigned at  M                   Additional Staff: Birth: Height:             180.34 cm           Admit Date:           12/23/2024 Weight:             100.70 kg           Admission Status:     Inpatient -                                                               Routine BSA / BMI:          2.20 m2 / 30.96     Encounter#:           [id number]                     kg/m2 Blood Pressure:     182/100 mmHg         Department Location:  [facility] Study Type:    TRANSTHORACIC ECHO (TTE) COMPLETE Diagnosis/ICD: ST elevation (STEMI) myocardial infarction of unspecified                site-I21.3 Indication:    STEMI CPT Code:      Echo Complete w Full Doppler-02016 Patient History: MI Location/Type:  ST Elevation MI PCI and Stent:     Stent deployed in the OM on 12/23/2024. Pertinent History: HTN and Hyperlipidemia. Study Detail: The following Echo studies were performed: 2D, Doppler, M-Mode and               color flow. Definity used as a contrast agent for endocardial               border definition. Total contrast used for this procedure was 3 mL               via IV push.  PHYSICIAN INTERPRETATION: Left Ventricle: Left ventricular ejection fraction is mildly decreased, calculated by Mcdonald's biplane at 44%. Left venticular wall motion is abnormal. The left ventricular cavity size is normal. There is mildly increased septal and mildly increased posterior left ventricular wall thickness. There is left ventricular concentric remodeling. Spectral Doppler shows an abnormal pattern of left ventricular diastolic filling. Sludge concerning for early thrombus is noted on contrasted imaging. LV Wall Scoring: The entire lateral wall and mid and apical anterior wall are hypokinetic. Left Atrium: The left atrium is normal in size. Right Ventricle: The right ventricle is normal in size. There is low normal right ventricular systolic function. TAPSe = 10 mm. Right Atrium: The right atrium is normal in size. Aortic Valve: The aortic valve is trileaflet. The aortic valve dimensionless index is 0.82. There is no evidence of aortic valve regurgitation. The peak instantaneous gradient of the aortic valve is 5 mmHg. The mean gradient of the aortic valve is 3 mmHg. Mitral Valve: The mitral valve is mildly thickened. There is trace mitral valve regurgitation. Tricuspid Valve: The tricuspid valve is structurally normal. There is trace tricuspid  regurgitation. The Doppler estimated RVSP is within normal limits at 17.5 mmHg. Pulmonic Valve: The pulmonic valve is structurally normal. There is physiologic pulmonic valve regurgitation. Pericardium: There is no pericardial effusion noted. Aorta: The aortic root is abnormal. There is mild dilatation of the ascending aorta. There is mild dilatation of the aortic root. In comparison to the previous echocardiogram(s): There are no prior studies on this patient for comparison purposes.  CONCLUSIONS:  1. Left ventricular ejection fraction is mildly decreased, calculated by Mcdonald's biplane at 44%.  2. Abnormal left venticular wall motion.  3. Entire lateral wall and mid and apical anterior wall are abnormal.  4. Spectral Doppler shows an abnormal pattern of left ventricular diastolic filling.  5. There is low normal right ventricular systolic function.  6. Right ventricular systolic pressure is within normal limits.  7. Sludge concerning for early thrombus is noted on contrasted imaging. QUANTITATIVE DATA SUMMARY:  2D MEASUREMENTS:          Normal Ranges: LAs:             3.19 cm  (2.7-4.0cm) IVSd:            1.20 cm  (0.6-1.1cm) LVPWd:           1.11 cm  (0.6-1.1cm) LVIDd:           4.34 cm  (3.9-5.9cm) LVIDs:           2.99 cm LV Mass Index:   80 g/m2 LVEDV Index:     48 ml/m2 LV % FS          30.9 %  LA VOLUME:                    Normal Ranges: LA Vol A4C:        56.6 ml    (22+/-6mL/m2) LA Vol A2C:        57.6 ml LA Vol BP:         59.5 ml LA Vol Index A4C:  25.7ml/m2 LA Vol Index A2C:  26.1 ml/m2 LA Vol Index BP:   27.0 ml/m2 LA Area A4C:       17.5 cm2 LA Area A2C:       18.4 cm2 LA Major Axis A4C: 4.6 cm LA Major Axis A2C: 5.0 cm LA Volume Index:   27.1 ml/m2 LA Vol A4C:        54.4 ml LA Vol A2C:        51.2 ml LA Vol Index BSA:  23.9 ml/m2  AORTA MEASUREMENTS:         Normal Ranges: Ao Sinus, d:        4.20 cm (2.1-3.5cm) Ao STJ, d:          3.30 cm (1.7-3.4cm) Asc Ao, d:          3.80 cm (2.1-3.4cm)  LV  SYSTOLIC FUNCTION BY 2D PLANIMETRY (MOD):                      Normal Ranges: EF-A4C View:    41 % (>=55%) EF-A2C View:    49 % EF-Biplane:     44 % LV EF Reported: 44 %  LV DIASTOLIC FUNCTION:             Normal Ranges: MV Peak E:             0.32 m/s    (0.7-1.2 m/s) MV Peak A:             0.91 m/s    (0.42-0.7 m/s) E/A Ratio:             0.35        (1.0-2.2) MV e'                  0.055 m/s   (>8.0) MV lateral e'          0.05 m/s MV medial e'           0.06 m/s MV A Dur:              114.18 msec E/e' Ratio:            5.80        (<8.0) a'                     0.16 m/s PulmV Sys Aj:         38.53 cm/s PulmV Hernandez Aj:        24.67 cm/s PulmV S/D Aj:         1.51 PulmV A Revs Aj:      19.88 cm/s PulmV A Revs Dur:      95.15 msec  MITRAL VALVE:          Normal Ranges: MV DT:        234 msec (150-240msec)  AORTIC VALVE:                     Normal Ranges: AoV Vmax:                1.13 m/s (<=1.7m/s) AoV Peak P.1 mmHg (<20mmHg) AoV Mean PG:             3.0 mmHg (1.7-11.5mmHg) LVOT Max Aj:            0.97 m/s (<=1.1m/s) AoV VTI:                 19.71 cm (18-25cm) LVOT VTI:                16.19 cm LVOT Diameter:           2.24 cm  (1.8-2.4cm) AoV Area, VTI:           3.24 cm2 (2.5-5.5cm2) AoV Area,Vmax:           3.37 cm2 (2.5-4.5cm2) AoV Dimensionless Index: 0.82  RIGHT VENTRICLE: RV Basal 3.50 cm RV Mid   2.70 cm RV Major 7.7 cm TAPSE:   10.0 mm RV s'    0.08 m/s  TRICUSPID VALVE/RVSP:          Normal Ranges: Peak TR Velocity:     1.90 m/s Est. RA Pressure:     3 mmHg RV Syst Pressure:     17 mmHg  (< 30mmHg) IVC Diam:             1.29 cm  PULMONIC VALVE:          Normal Ranges: RVOT Vmax:      0.60 m/s (0.6-0.9m/s) PV Max Aj:     0.9 m/s  (0.6-0.9m/s) PV Max PG:      3.5 mmHg  Pulmonary Veins: PulmV A Revs Dur: 95.15 msec PulmV A Revs Aj: 19.88 cm/s PulmV Hernandez Aj:   24.67 cm/s PulmV S/D Aj:    1.51 PulmV Sys Aj:    38.53 cm/s  AORTA: Asc Ao Diam 3.84 cm  [id number] Baron Schafer DO Electronically  signed on 12/23/2024 at 6:54:03 PM  Wall Scoring  ** Final **     Cardiac Catheterization Procedure    Result Date: 12/23/2024   [facility], Cath Lab, [address] [place] [address] Cardiovascular Catheterization Report Patient Name:     BRADEN BERNAL    Performing Physician:  Tony Rodriguez MD Study Date:       12/23/2024          Verifying Physician:   Tony Rodriguez MD MRN/PID:          [id number]            Cardiologist/Co-Scrub: Accession#:       [id number]        Ordering Provider:     Tony RODRIGUEZ Date of           1955 / 69      Cardiologist: Birth/Age:        years Gender:           M                   Fellow: Encounter#:       [id number]          Surgeon:  Study: Left Heart Cath with PCI  Indications: BRADEN BERNAL is a 70 year old male who presents with hypertension and a chest pain assessment of typical angina. Acute coronary syndrome - STEMI. Medical History: Stress test performed: No. CTA performed: NoJunior Galvan accessed: No. LVEF Assessed: No. Cardiac arrest: No. Cardiac surgical consult: No. Cardiovascular instability: No. Frailty status of patient entering lab: 6 = Moderately frail.  Procedure Description: After infiltration with 2% Lidocaine, the right radial artery was cannulated with a modified Seldinger technique. Subsequently a 6 Serbian sheath was placed in the right radial artery. Selective coronary catheterization was performed using a 5 Fr catheter(s) exchanged over a guide wire to cannulate the coronary arteries. A JL 3.5 tip catheter was used for left coronary injections. A JR 4 tip catheter was used for right coronary injections. Multiple injections of contrast were made into the left and right coronary arteries with angiograms recorded in multiple projections. After completion of the procedure, the arterial sheath was  pulled and a TR Band Radial Compression Device was utilized to obtain patent hemostasis.  Coronary Angiography: The coronary circulation is co-dominant.  Left Main Coronary Artery: The left main coronary artery is a normal caliber vessel. The left main arises normally from the left coronary sinus of Valsalva and bifurcates into the LAD and circumflex coronary arteries. The left main coronary artery showed no significant disease or stenosis greater than 30%.  Left Anterior Descending Coronary Artery Distribution: The left anterior descending coronary artery is a normal caliber vessel. The LAD arises normally from the left main coronary artery. The mid left anterior descending coronary artery showed 40% stenosis. An additional lesion, located at the distal left anterior descending coronary artery, revealed 50% stenosis. The 1st diagonal branch showed no significant disease or stenosis greater than 30%. The 2nd diagonal branch is a small caliber vessel. The ostial 2nd diagonal branch showed 50% stenosis. The 3rd diagonal branch revealed no significant disease or stenosis greater than 30%.  Circumflex Coronary Artery Distribution: The circumflex coronary artery is a normal caliber vessel. The circumflex arises normally from the left main coronary artery and terminates in the AV groove. The circumflex revealed no significant disease or stenosis greater than 30%. The 1st obtuse marginal branch is a small caliber vessel. The 1st obtuse marginal branch showed no significant disease or stenosis greater than 30%. The 2nd obtuse marginal branch is a large caliber vessel. The proximal 2nd obtuse marginal branch demonstrated 100% occlusion of a banch of OM2. Culprit is a branch of OM2 which supplies the lateral wall and part of the inferior wall. The left posterior descending artery is a small caliber vessel. The left posterior descending artery showed no significant disease or stenosis greater than 30%.  Right Coronary Artery  Distribution: The right coronary artery is a normal caliber vessel. The RCA arises normally from the right sinus of Valsalva. The proximal and mid to distal right coronary artery showed 50% stenosis. The acute marginal branch showed no significant disease or stenosis greater than 30%. The right posterolateral branch showed no significant disease or stenosis greater than 30%. The right posterior descending artery showed no significant disease or stenosis greater than 30%.  Coronary Interventions: Angiography reveals a 100% stenosis of the proximal branch of OM2 coronary artery. Pre-intervention ANDREY flow was 1. Percutaneous coronary intervention was performed within the proximal branch of OM2. The vessel was pre-dilated using a compliant balloon 2.0 mm x 12 mm Spalding GEM 2.5 mm x 22 mm was advanced to the lesion and implanted. The stent was post dilated using a non-compliant balloon 2.5 mm x 12 mm at 24 KIYA. The stenosis was successfully reduced from 100% to 0%. Post-intervention ANDREY flow was 3. We used 6 Kinyarwanda EBU 3.5 guide catheter for left engagement. We wired the LCx using a Runthrough wire. We predilated the OM 2 branch using 2.0 compliant balloon, this was followed by the deployment of 2.5 x 22 mm GEM, which was postdilated using 2.5 NC balloon at high pressures. We performed post inflation for prolonged time of 2 minutes to overcome the underexpansion in the proximal segment of the stent. Final angiogram with excellent results and no complications. Patient is chest pain free at the end of the procedure.  Coronary Lesion Summary: Vessel                   Stenosis                   Vessel Segment LAD                    40% stenosis                      mid LAD                    50% stenosis                     distal 2nd Diagonal           50% stenosis                     ostial OM 2         100% occlusion of a banch of OM2          proximal RCA                    50% stenosis           proximal and mid to  distal  Hemo Personnel: +----------------+---------+ Name            Duty      +----------------+---------+ Deion Rodriguez MD 1 +----------------+---------+  Hemodynamic Pressures:  +----+---------------------+----------+-------------+--------------+---------+ Site      Date Time      Phase NameSystolic mmHgDiastolic mmHgMean mmHg +----+---------------------+----------+-------------+--------------+---------+   AO12/23/2024 8:01:44 AM  AIR REST          176           110      138 +----+---------------------+----------+-------------+--------------+---------+   AO12/23/2024 8:02:24 AM  AIR REST          157            98      120 +----+---------------------+----------+-------------+--------------+---------+   AO12/23/2024 8:02:31 AM  AIR REST            0            84      114 +----+---------------------+----------+-------------+--------------+---------+   AO12/23/2024 8:16:11 AM  AIR REST          166            83      122 +----+---------------------+----------+-------------+--------------+---------+   AO12/23/2024 8:34:10 AM  AIR REST          181           108      136 +----+---------------------+----------+-------------+--------------+---------+  Complications: No in-lab complications observed.  Cardiac Cath Post Procedure Notes: Post Procedure Diagnosis: See below. Blood Loss:               Estimated blood loss during the procedure was 5 mls. Specimens Removed:        Number of specimen(s) removed: none. ____________________________________________________________________________________ CONCLUSIONS:  1. 99% thrombotic occlusion of a branch of OM 2, otherwise mild to moderate CAD elsewhere in a co_dominant system. This OM 2 branch supplies part of the lateral and inferior walls which explains his massive inferior and lateral ST elevation on EKG at presentation.  2. Status post successful PCI of OM2 branch using 2.5 x 22 mm GEM [postdilated using 2.5 NC  balloon at high pressure and for 2 minutes].  3. Aspirin 81 mg once daily for life and ticagrelor 90 mg twice daily for at least 12 months. ICD 10 Codes: ST elevation (STEMI) myocardial infarction involving left anterior descending coronary artery-I21.02  CPT Codes: Revasc during AMI stent/angio/atherc,ins asp Thrombectomy, Left Anterior Descending single Vessel (PCI)-89857.LD; Left Heart Cath (visualization of coronaries) and LV-98891  [id number] Deion Rodriguez MD Performing Physician Electronically signed by [id number] Rodriguez MD on 12/23/2024 at 10:50:09 AM  ** Final (Updated) **     ECG 12 Lead    Result Date: 12/23/2024  Sinus rhythm with Premature supraventricular complexes Right bundle branch block Lateral infarct , new Inferior infarct , age undetermined ** ** ACUTE MI / STEMI ** ** Abnormal ECG When compared with ECG of 04-JAN-2012 11:48, Premature supraventricular complexes are now Present Right bundle branch block is now Present Acute Lateral infarct is now Present Inferior infarct is now Present    Electrocardiogram 12 Lead    Result Date: 12/23/2024  Sinus rhythm with Premature atrial complexes with Aberrant conduction Right bundle branch block Inferior infarct (cited on or before 23-DEC-2024) Anterolateral infarct (cited on or before 23-DEC-2024) Abnormal ECG When compared with ECG of 23-DEC-2024 09:35, No significant change was found       This patient has a urinary catheter   Reason for the urinary catheter remaining today? urinary retention/bladder outlet obstruction, acute or chronic               Assessment/Plan   Assessment & Plan  ST elevation myocardial infarction (STEMI), unspecified artery (Multi)      This is a 70yo male with history of HTN, current smoker, and occasional ETOH use.  [place] [facility] [place] of [place] chest pain described as indegestion, unrelieved by antacids, which would come and go, however, becoming constant after 8pm yesterday.   Patient work up revealed  ST elevation in precordial leads ii,iii,avf, and lateral leads V4,5,and 6.  Patient treated with ASA, Brilinta, and heparin.  Transferred to cath lab for STEMI where a stent was placed in the proximal OM2.  Patient admitted to ICU following [facility] in stable condition. He denies any chest pain at this time.      Plan:      Neuro:   - Neuro checks per unit protocol  - As needed tylenol  - Sleep hygiene     CV:  Hx HTN  CAD, STEMI s/p LHC with PCI to proximal branch of OM2  Echo LVEF 44%  - NIBP and tele-monitoring  - ASA, high intensity statin coreg, brilinta  - Cardiology following     Pulm:   Current smoker, oxygenating well on RA  - Sats >90%  - Encourage coughing and deep breathing     Renal:   Preserved renal function, BPH  Urinary retention --> Mcwilliams placed by Urology c mild hematuria due to previous traumatic mcwilliams attempts  - Daily RFP and replete electrolytes as needed  - Monitor UO  - Continue tamsulosin  - Urology consulted     GI:   - Regular diet  - Bowel regimen     Heme:   - Daily CBC   - SCDs and Lovenox for DVTppx     ID:   - Trend temps  - trend WBCs     Dispo: transfer out of ICU to cardiology service    I spent 30 minutes of critical care time in the professional and overall care of this patient.      JHOANA Mcallister-CNP

## 2024-12-24 NOTE — PROGRESS NOTES
Subjective Data:  Patient seen and examined, chart reviewed   -- Overnight urine retention issues prompting Urology to place 20F 3 Way Hematuria Catheter  -- Manual Flushing recommended   -- OK for DAPT / Enoxaparin at DVT Ppx Dosing     -- Cardiac telemetry reviewed showing sinus rhythm      Objective Data:  Last Recorded Vitals:  Vitals:    12/24/24 0300 12/24/24 0400 12/24/24 0401 12/24/24 0800   BP:   130/77 123/90   BP Location:    Left arm   Patient Position:    Sitting   Pulse:   83 76   Resp:   18 20   Temp:  37.4 °C (99.4 °F)  37.3 °C (99.1 °F)   TempSrc:    Temporal   SpO2: 92%  99% 98%   Weight:       Height:           Last Labs:  Results from last 7 days   Lab Units 12/24/24  0650 12/23/24  0749   WBC AUTO x10*3/uL 16.4* 13.2*   HEMOGLOBIN g/dL 14.1 15.7   HEMATOCRIT % 40.7* 47.8   PLATELETS AUTO x10*3/uL 218 277     Results from last 7 days   Lab Units 12/24/24  0650 12/23/24  0749   SODIUM mmol/L 134* 137   POTASSIUM mmol/L 3.7 3.8   CHLORIDE mmol/L 104 101   CO2 mmol/L 25 27   BUN mg/dL 10 7   CREATININE mg/dL 1.19 1.09   CALCIUM mg/dL 8.1* 9.3   PROTEIN TOTAL g/dL  --  8.0   BILIRUBIN TOTAL mg/dL  --  0.6   ALK PHOS U/L  --  74   ALT U/L  --  20   AST U/L  --  79*   GLUCOSE mg/dL 119* 121*     Results from last 7 days   Lab Units 12/23/24  0749   TROPHS ng/L 8,174*        T     Last I/O:  I/O last 3 completed shifts:  In: - (0 mL/kg)   Out: 1205 (12 mL/kg) [Urine:1200 (0.3 mL/kg/hr); Blood:5]  Weight: 100.7 kg     Past Cardiology Tests (Last 3 Years):  EKG:  Electrocardiogram 12 Lead 12/23/2024 (Preliminary)      ECG 12 Lead 12/23/2024 (Preliminary)    Echo:  Transthoracic Echo (TTE) Complete 12/23/2024  CONCLUSIONS:   1. Left ventricular ejection fraction is mildly decreased, calculated by [employer] biplane at 44%.   2. Abnormal left venticular wall motion.   3. Entire lateral wall and mid and apical anterior wall are abnormal.   4. Spectral Doppler shows an abnormal pattern of left ventricular  diastolic filling.   5. There is low normal right ventricular systolic function.   6. Right ventricular systolic pressure is within normal limits.   7. Sludge concerning for early thrombus is noted on contrasted imaging.    Ejection Fractions:  EF   Date/Time Value Ref Range Status   12/23/2024 03:18 PM 44 %      Cath:  Cardiac Catheterization Procedure 12/23/2024  Coronary Angiography:  The coronary circulation is co-dominant.     Left Main Coronary Artery:  The left main coronary artery is a normal caliber vessel. The left main arises normally from the left coronary sinus of Valsalva and bifurcates into the LAD and circumflex coronary arteries. The left main coronary artery showed no significant disease or stenosis greater than 30%.     Left Anterior Descending Coronary Artery Distribution:  The left anterior descending coronary artery is a normal caliber vessel. The LAD arises normally from the left main coronary artery. The mid left anterior descending coronary artery showed 40% stenosis. An additional lesion, located at the distal left anterior descending coronary artery, revealed 50% stenosis. The 1st diagonal branch showed no significant disease or stenosis greater than 30%. The 2nd diagonal branch is a small caliber vessel. The ostial 2nd diagonal branch showed 50% stenosis. The 3rd diagonal branch revealed no significant disease or stenosis greater than 30%.     Circumflex Coronary Artery Distribution:  The circumflex coronary artery is a normal caliber vessel. The circumflex arises normally from the left main coronary artery and terminates in the AV groove. The circumflex revealed no significant disease or stenosis greater than 30%. The 1st obtuse marginal branch is a small caliber vessel. The 1st obtuse marginal branch showed no significant disease or stenosis greater than 30%. The 2nd obtuse marginal branch is a large caliber vessel. The proximal 2nd obtuse marginal branch demonstrated 100% occlusion of  a banch of OM2. Culprit is a branch of OM2 which supplies the lateral wall and part of the inferior wall. The left posterior descending artery is a small caliber vessel. The left posterior descending artery showed no significant disease or stenosis greater than 30%.     Right Coronary Artery Distribution:     The right coronary artery is a normal caliber vessel. The RCA arises normally from the right sinus of Valsalva. The proximal and mid to distal right coronary artery showed 50% stenosis. The acute marginal branch showed no significant disease or stenosis greater than 30%.  The right posterolateral branch showed no significant disease or stenosis greater than 30%. The right posterior descending artery showed no significant disease or stenosis greater than 30%.     Coronary Interventions:  Angiography reveals a 100% stenosis of the proximal branch of OM2 coronary artery. Pre-intervention ANDREY flow was 1. Percutaneous coronary intervention was performed within the proximal branch of OM2. The vessel was pre-dilated using a compliant balloon 2.0 mm x 12 mm Guillermo GEM 2.5 mm x 22 mm was advanced to the lesion and implanted. The stent was post dilated using a non-compliant balloon 2.5 mm x 12 mm at 24 KIYA. The stenosis was successfully reduced from 100% to 0%. Post-intervention ANDREY flow was 3.     We used 6 Sami EBU 3.5 guide catheter for left engagement. We wired the LCx using a Runthrough wire. We predilated the OM 2 branch using 2.0 compliant balloon, this was followed by the deployment of 2.5 x 22 mm GEM, which was postdilated using 2.5 NC balloon at high pressures. We performed post inflation for prolonged time of 2 minutes to overcome the underexpansion in the proximal segment of the stent. Final angiogram with excellent results and no complications. Patient is chest pain free at the end of the procedure.     Coronary Lesion Summary:  Vessel                   Stenosis                   Vessel Segment  LAD         "            40% stenosis                      mid  LAD                    50% stenosis                     distal  2nd Diagonal           50% stenosis                     ostial  OM 2         100% occlusion of a banch of OM2          proximal  RCA                    50% stenosis           proximal and mid to distal          Stress Test:  No results found for this or any previous visit from the past 1095 days.    Cardiac Imaging:  No results found for this or any previous visit from the past 1095 days.      Inpatient Medications:  Scheduled medications   Medication Dose Route Frequency    aspirin  81 mg oral Daily    atorvastatin  80 mg oral Nightly    carvedilol  6.25 mg oral BID    enoxaparin  40 mg subcutaneous q24h    pantoprazole  40 mg oral Daily before breakfast    Or    esomeprazole  40 mg nasoduodenal tube Daily before breakfast    Or    pantoprazole  40 mg intravenous Daily before breakfast    perflutren protein A microsphere  0.5 mL intravenous Once in imaging    polyethylene glycol  17 g oral Daily    sulfur hexafluoride microsphr  2 mL intravenous Once in imaging    tamsulosin  0.4 mg oral Daily    ticagrelor  90 mg oral BID     PRN medications   Medication    acetaminophen    Or    acetaminophen    Or    acetaminophen    melatonin    nitroglycerin    oxygen     Continuous Medications   Medication Dose Last Rate       Physical Exam:  Documented Vital Signs   Heart Rate:  []   Temp:  [36.1 °C (97 °F)-37.4 °C (99.4 °F)]   Resp:  [11-32]   BP: (123-165)/()   Height:  [180.3 cm (5' 11\")]   Weight:  [101 kg (222 lb)]   SpO2:  [92 %-100 %]   Temp:  [36.1 °C (97 °F)-37.4 °C (99.4 °F)] 37.3 °C (99.1 °F)  Heart Rate:  [] 76  Resp:  [11-32] 20  BP: (123-165)/() 123/90      Oxygen Dose: *3 L/min    Documented Fluid Status     Intake/Output Summary (Last 24 hours) at 12/24/2024 1131  Last data filed at 12/24/2024 1000  Gross per 24 hour   Intake --   Output 1140 ml   Net -1140 ml     Net IO " Since Admission: -1,295 mL [12/24/24 1131]       Assessment/Plan   Peña Arenas is a 69 y.o. male with past medical history significant for Hypertension, Dyslipidemia, BPH. Presented with chest pain. Cardiology is consulted for STEMI s/p PCI to OM.       #Inferior and lateral STEMI  -Patient presented with chest discomfort as yesterday.  ECG on presentation showed normal sinus rhythm with ST elevation in inferior and lateral leads.  He was taken for urgent coronary angiogram which revealed 99% thrombotic occlusion of the branch of OM 2 otherwise mild to moderate CAD elsewhere with subsequent PCI 2.5 22 mm GEM.  -Continue DAPT with aspirin 81 mg daily and Brilinta 90 mg twice daily  -Atorvastatin 80 mg daily  -Agree with Coreg 6.25 mg/day for BP and heart rate controlled  - We will obtain a transthoracic echocardiogram for structural evaluation including ejection fraction, assessment of regional wall motion abnormalities or valvular disease, and further evaluation of hemodynamics.    -Check lipid panel and hemoglobin A1c     #HTN  Elevated  Has been off his medications for at least 8 months   Agree with Coreg 6.25 mg BID     #DLD  -Last  on 3/14/2023  -Start atorvastatin 80 mg daily  -Repeat lipid panel            The patient will benefit from follow-up in Cardiology clinic within 3-5 weeks of discharge.  The patient or their family should call the [facility] [place] at [phone] for [facility].  If the care team is assisting in scheduling a follow up appointment prior to their discharge, please ensure that the patient has committed to attending the scheduled date and time.     Thank you for allowing me to participate in their care.  Please feel free to call me with any further questions or concerns.  Peripheral IV 12/23/24 18 G Distal;Left;Upper Arm (Active)   Site Assessment Clean;Dry;Intact 12/23/24 2100   Dressing Status Dry 12/23/24 2100   Number of days: 1        Urethral Catheter 20 Fr. (Active)   Output (mL) 25 mL 12/24/24 1000   Number of days: 1       Code Status:  Full Code      Baron Schafer DO   Division of Cardiovascular Medicine  [facility] [place]

## 2024-12-24 NOTE — PROGRESS NOTES
12/24/24 0866   Discharge Planning   Expected Discharge Disposition Home         Patient had cardiac catheterization done yesterday and had one stent placed to OM2. Needs cardiac clearance and when patient is medically ready will go home with no needs identified.

## 2024-12-25 ENCOUNTER — APPOINTMENT (OUTPATIENT)
Dept: RADIOLOGY | Facility: HOSPITAL | Age: 69
End: 2024-12-25
Payer: COMMERCIAL

## 2024-12-25 LAB
ALBUMIN SERPL BCP-MCNC: 3 G/DL (ref 3.4–5)
ANION GAP SERPL CALC-SCNC: 12 MMOL/L (ref 10–20)
APPEARANCE UR: ABNORMAL
BILIRUB UR STRIP.AUTO-MCNC: NEGATIVE MG/DL
BUN SERPL-MCNC: 13 MG/DL (ref 6–23)
CALCIUM SERPL-MCNC: 8 MG/DL (ref 8.6–10.3)
CHLORIDE SERPL-SCNC: 105 MMOL/L (ref 98–107)
CO2 SERPL-SCNC: 22 MMOL/L (ref 21–32)
COLOR UR: ABNORMAL
CREAT SERPL-MCNC: 1.2 MG/DL (ref 0.5–1.3)
EGFRCR SERPLBLD CKD-EPI 2021: 66 ML/MIN/1.73M*2
ERYTHROCYTE [DISTWIDTH] IN BLOOD BY AUTOMATED COUNT: 14.5 % (ref 11.5–14.5)
GLUCOSE SERPL-MCNC: 105 MG/DL (ref 74–99)
GLUCOSE UR STRIP.AUTO-MCNC: NORMAL MG/DL
HCT VFR BLD AUTO: 39 % (ref 41–52)
HGB BLD-MCNC: 13.5 G/DL (ref 13.5–17.5)
KETONES UR STRIP.AUTO-MCNC: ABNORMAL MG/DL
LEUKOCYTE ESTERASE UR QL STRIP.AUTO: ABNORMAL
MAGNESIUM SERPL-MCNC: 1.77 MG/DL (ref 1.6–2.4)
MCH RBC QN AUTO: 30.7 PG (ref 26–34)
MCHC RBC AUTO-ENTMCNC: 34.6 G/DL (ref 32–36)
MCV RBC AUTO: 89 FL (ref 80–100)
MUCOUS THREADS #/AREA URNS AUTO: ABNORMAL /LPF
NITRITE UR QL STRIP.AUTO: NEGATIVE
NRBC BLD-RTO: 0 /100 WBCS (ref 0–0)
PH UR STRIP.AUTO: 5.5 [PH]
PHOSPHATE SERPL-MCNC: 2.5 MG/DL (ref 2.5–4.9)
PLATELET # BLD AUTO: 210 X10*3/UL (ref 150–450)
POTASSIUM SERPL-SCNC: 3.6 MMOL/L (ref 3.5–5.3)
PROT UR STRIP.AUTO-MCNC: ABNORMAL MG/DL
RBC # BLD AUTO: 4.4 X10*6/UL (ref 4.5–5.9)
RBC # UR STRIP.AUTO: ABNORMAL /UL
RBC #/AREA URNS AUTO: >20 /HPF
SODIUM SERPL-SCNC: 135 MMOL/L (ref 136–145)
SP GR UR STRIP.AUTO: 1.03
UROBILINOGEN UR STRIP.AUTO-MCNC: ABNORMAL MG/DL
WBC # BLD AUTO: 17.8 X10*3/UL (ref 4.4–11.3)
WBC #/AREA URNS AUTO: >50 /HPF

## 2024-12-25 PROCEDURE — 36415 COLL VENOUS BLD VENIPUNCTURE: CPT | Performed by: NURSE PRACTITIONER

## 2024-12-25 PROCEDURE — 99232 SBSQ HOSP IP/OBS MODERATE 35: CPT | Performed by: INTERNAL MEDICINE

## 2024-12-25 PROCEDURE — 2060000001 HC INTERMEDIATE ICU ROOM DAILY

## 2024-12-25 PROCEDURE — 71046 X-RAY EXAM CHEST 2 VIEWS: CPT | Performed by: RADIOLOGY

## 2024-12-25 PROCEDURE — 2500000002 HC RX 250 W HCPCS SELF ADMINISTERED DRUGS (ALT 637 FOR MEDICARE OP, ALT 636 FOR OP/ED): Performed by: NURSE PRACTITIONER

## 2024-12-25 PROCEDURE — 87086 URINE CULTURE/COLONY COUNT: CPT | Mod: AHULAB | Performed by: INTERNAL MEDICINE

## 2024-12-25 PROCEDURE — 81001 URINALYSIS AUTO W/SCOPE: CPT | Performed by: INTERNAL MEDICINE

## 2024-12-25 PROCEDURE — 85027 COMPLETE CBC AUTOMATED: CPT | Performed by: NURSE PRACTITIONER

## 2024-12-25 PROCEDURE — 71046 X-RAY EXAM CHEST 2 VIEWS: CPT

## 2024-12-25 PROCEDURE — 2500000001 HC RX 250 WO HCPCS SELF ADMINISTERED DRUGS (ALT 637 FOR MEDICARE OP): Performed by: INTERNAL MEDICINE

## 2024-12-25 PROCEDURE — 80069 RENAL FUNCTION PANEL: CPT | Performed by: NURSE PRACTITIONER

## 2024-12-25 PROCEDURE — 2500000004 HC RX 250 GENERAL PHARMACY W/ HCPCS (ALT 636 FOR OP/ED): Performed by: NURSE PRACTITIONER

## 2024-12-25 PROCEDURE — 83735 ASSAY OF MAGNESIUM: CPT | Performed by: NURSE PRACTITIONER

## 2024-12-25 PROCEDURE — 2500000001 HC RX 250 WO HCPCS SELF ADMINISTERED DRUGS (ALT 637 FOR MEDICARE OP): Performed by: NURSE PRACTITIONER

## 2024-12-25 PROCEDURE — 2500000004 HC RX 250 GENERAL PHARMACY W/ HCPCS (ALT 636 FOR OP/ED): Performed by: INTERNAL MEDICINE

## 2024-12-25 PROCEDURE — 2500000002 HC RX 250 W HCPCS SELF ADMINISTERED DRUGS (ALT 637 FOR MEDICARE OP, ALT 636 FOR OP/ED): Performed by: INTERNAL MEDICINE

## 2024-12-25 PROCEDURE — 36415 COLL VENOUS BLD VENIPUNCTURE: CPT | Performed by: INTERNAL MEDICINE

## 2024-12-25 PROCEDURE — 87075 CULTR BACTERIA EXCEPT BLOOD: CPT | Mod: AHULAB | Performed by: INTERNAL MEDICINE

## 2024-12-25 RX ORDER — POLYETHYLENE GLYCOL 3350 17 G/17G
17 POWDER, FOR SOLUTION ORAL DAILY
Status: DISCONTINUED | OUTPATIENT
Start: 2024-12-25 | End: 2024-12-25 | Stop reason: SDUPTHER

## 2024-12-25 RX ORDER — LOSARTAN POTASSIUM 25 MG/1
12.5 TABLET ORAL DAILY
Status: DISCONTINUED | OUTPATIENT
Start: 2024-12-25 | End: 2024-12-27 | Stop reason: HOSPADM

## 2024-12-25 RX ORDER — ADHESIVE BANDAGE
30 BANDAGE TOPICAL DAILY PRN
Status: DISCONTINUED | OUTPATIENT
Start: 2024-12-25 | End: 2024-12-27 | Stop reason: HOSPADM

## 2024-12-25 RX ORDER — CEFTRIAXONE 1 G/50ML
1 INJECTION, SOLUTION INTRAVENOUS EVERY 24 HOURS
Status: DISCONTINUED | OUTPATIENT
Start: 2024-12-25 | End: 2024-12-27

## 2024-12-25 RX ORDER — SPIRONOLACTONE 25 MG/1
12.5 TABLET ORAL DAILY
Status: DISCONTINUED | OUTPATIENT
Start: 2024-12-25 | End: 2024-12-27 | Stop reason: HOSPADM

## 2024-12-25 RX ADMIN — ENOXAPARIN SODIUM 40 MG: 40 INJECTION SUBCUTANEOUS at 23:32

## 2024-12-25 RX ADMIN — PANTOPRAZOLE SODIUM 40 MG: 40 TABLET, DELAYED RELEASE ORAL at 06:21

## 2024-12-25 RX ADMIN — SPIRONOLACTONE 12.5 MG: 25 TABLET ORAL at 16:44

## 2024-12-25 RX ADMIN — TICAGRELOR 90 MG: 90 TABLET ORAL at 23:32

## 2024-12-25 RX ADMIN — MAGNESIUM HYDROXIDE 30 ML: 400 SUSPENSION ORAL at 14:21

## 2024-12-25 RX ADMIN — ASPIRIN 81 MG 81 MG: 81 TABLET ORAL at 09:00

## 2024-12-25 RX ADMIN — CEFTRIAXONE SODIUM 1 G: 1 INJECTION, SOLUTION INTRAVENOUS at 15:15

## 2024-12-25 RX ADMIN — ATORVASTATIN CALCIUM 80 MG: 80 TABLET, FILM COATED ORAL at 23:32

## 2024-12-25 RX ADMIN — LOSARTAN POTASSIUM 12.5 MG: 25 TABLET, FILM COATED ORAL at 16:44

## 2024-12-25 RX ADMIN — POLYETHYLENE GLYCOL 3350 17 G: 17 POWDER, FOR SOLUTION ORAL at 09:00

## 2024-12-25 RX ADMIN — CARVEDILOL 6.25 MG: 6.25 TABLET, FILM COATED ORAL at 23:32

## 2024-12-25 RX ADMIN — CARVEDILOL 6.25 MG: 6.25 TABLET, FILM COATED ORAL at 09:00

## 2024-12-25 RX ADMIN — TICAGRELOR 90 MG: 90 TABLET ORAL at 09:00

## 2024-12-25 RX ADMIN — TAMSULOSIN HYDROCHLORIDE 0.4 MG: 0.4 CAPSULE ORAL at 09:00

## 2024-12-25 ASSESSMENT — COGNITIVE AND FUNCTIONAL STATUS - GENERAL
DAILY ACTIVITIY SCORE: 24
MOBILITY SCORE: 24
MOBILITY SCORE: 24
DAILY ACTIVITIY SCORE: 24
MOBILITY SCORE: 24
MOBILITY SCORE: 24
DAILY ACTIVITIY SCORE: 24
MOBILITY SCORE: 24

## 2024-12-25 ASSESSMENT — PAIN - FUNCTIONAL ASSESSMENT
PAIN_FUNCTIONAL_ASSESSMENT: 0-10

## 2024-12-25 ASSESSMENT — PAIN SCALES - GENERAL
PAINLEVEL_OUTOF10: 0 - NO PAIN

## 2024-12-25 NOTE — PROGRESS NOTES
"Peña Arenas is a 69 y.o. male on day 2 of admission presenting with ST elevation myocardial infarction (STEMI), unspecified artery (Multi).    Subjective    Seen and examined, discussed with the patient and the staff and the wife at the bedside, he is being transferred to [facility],.  Is a 69-year-old male, who is a smoker, and also known to history of hypertension, hyperlipidemia, BPH, came to the emergency department, with chest pain, severe chest pain, which was consistent after the 8 PM, and the workup with ST elevation in MI, had a cath done and stent was placed in the proximal OM 2       Objective     Physical Exam  Alert oriented ox3.  HEENT unremarkable.  Neck no JVD.  Heart S1-S2.  Lungs reduced air entry.  Abdomen soft nontender.  Legs no edema  Last Recorded Vitals  Blood pressure 121/87, pulse 85, temperature 36.6 °C (97.9 °F), temperature source Temporal, resp. rate 17, height 1.803 m (5' 11\"), weight 101 kg (222 lb), SpO2 94%.  Intake/Output last 3 Shifts:  I/O last 3 completed shifts:  In: 720 (7.2 mL/kg) [P.O.:720]  Out: 855 (8.5 mL/kg) [Urine:855 (0.2 mL/kg/hr)]  Weight: 100.7 kg     Relevant Results  Results for orders placed or performed during the hospital encounter of 12/23/24 (from the past 96 hours)   CBC and Auto Differential   Result Value Ref Range    WBC 13.2 (H) 4.4 - 11.3 x10*3/uL    nRBC 0.0 0.0 - 0.0 /100 WBCs    RBC 5.16 4.50 - 5.90 x10*6/uL    Hemoglobin 15.7 13.5 - 17.5 g/dL    Hematocrit 47.8 41.0 - 52.0 %    MCV 93 80 - 100 fL    MCH 30.4 26.0 - 34.0 pg    MCHC 32.8 32.0 - 36.0 g/dL    RDW 14.4 11.5 - 14.5 %    Platelets 277 150 - 450 x10*3/uL    Neutrophils % 67.3 40.0 - 80.0 %    Immature Granulocytes %, Automated 0.4 0.0 - 0.9 %    Lymphocytes % 24.1 13.0 - 44.0 %    Monocytes % 7.6 2.0 - 10.0 %    Eosinophils % 0.2 0.0 - 6.0 %    Basophils % 0.4 0.0 - 2.0 %    Neutrophils Absolute 8.92 (H) 1.20 - 7.70 x10*3/uL    Immature Granulocytes Absolute, Automated 0.05 0.00 - 0.70 " x10*3/uL    Lymphocytes Absolute 3.19 1.20 - 4.80 x10*3/uL    Monocytes Absolute 1.00 0.10 - 1.00 x10*3/uL    Eosinophils Absolute 0.03 0.00 - 0.70 x10*3/uL    Basophils Absolute 0.05 0.00 - 0.10 x10*3/uL   Comprehensive Metabolic Panel   Result Value Ref Range    Glucose 121 (H) 74 - 99 mg/dL    Sodium 137 136 - 145 mmol/L    Potassium 3.8 3.5 - 5.3 mmol/L    Chloride 101 98 - 107 mmol/L    Bicarbonate 27 21 - 32 mmol/L    Anion Gap 13 10 - 20 mmol/L    Urea Nitrogen 7 6 - 23 mg/dL    Creatinine 1.09 0.50 - 1.30 mg/dL    eGFR 74 >60 mL/min/1.73m*2    Calcium 9.3 8.6 - 10.3 mg/dL    Albumin 3.9 3.4 - 5.0 g/dL    Alkaline Phosphatase 74 33 - 136 U/L    Total Protein 8.0 6.4 - 8.2 g/dL    AST 79 (H) 9 - 39 U/L    Bilirubin, Total 0.6 0.0 - 1.2 mg/dL    ALT 20 10 - 52 U/L   Troponin I, High Sensitivity   Result Value Ref Range    Troponin I, High Sensitivity 8,174 (HH) 0 - 20 ng/L   Protime-INR   Result Value Ref Range    Protime 11.5 9.8 - 12.8 seconds    INR 1.0 0.9 - 1.1   aPTT   Result Value Ref Range    aPTT 31 27 - 38 seconds   ACTIVATED CLOTTING TIME LOW   Result Value Ref Range    POCT Activated Clotting Time Low Range 332 (H) 83 - 199 sec   ACTIVATED CLOTTING TIME LOW   Result Value Ref Range    POCT Activated Clotting Time Low Range 337 (H) 83 - 199 sec   ECG 12 Lead   Result Value Ref Range    Ventricular Rate 88 BPM    Atrial Rate 88 BPM    IL Interval 166 ms    QRS Duration 122 ms    QT Interval 408 ms    QTC Calculation(Bazett) 493 ms    P Axis 44 degrees    R Axis 67 degrees    T Axis 60 degrees    QRS Count 14 beats    Q Onset 212 ms    P Onset 129 ms    P Offset 185 ms    T Offset 416 ms    QTC Fredericia 463 ms   Electrocardiogram 12 Lead   Result Value Ref Range    Ventricular Rate 77 BPM    Atrial Rate 77 BPM    IL Interval 176 ms    QRS Duration 128 ms    QT Interval 434 ms    QTC Calculation(Bazett) 491 ms    P Axis 44 degrees    R Axis 50 degrees    T Axis 54 degrees    QRS Count 13 beats    Q  Onset 210 ms    P Onset 122 ms    P Offset 180 ms    T Offset 427 ms    QTC Fredericia 471 ms   Transthoracic Echo (TTE) Complete   Result Value Ref Range    AV pk suhail 1.13 m/s    AV mn grad 3 mmHg    LVOT diam 2.24 cm    MV E/A ratio 0.35     LA vol index A/L 27.0 ml/m2    Tricuspid annular plane systolic excursion 1.0 cm    LV EF 44 %    RV free wall pk S' 8.00 cm/s    LVIDd 4.34 cm    RVSP 17.5 mmHg    Aortic Valve Area by Continuity of VTI 3.24 cm2    Aortic Valve Area by Continuity of Peak Velocity 3.37 cm2    AV pk grad 5 mmHg    LV A4C EF 40.9    Hemoglobin A1C   Result Value Ref Range    Hemoglobin A1C 5.8 (H) See comment %    Estimated Average Glucose 120 Not Established mg/dL   Basic Metabolic Panel   Result Value Ref Range    Glucose 119 (H) 74 - 99 mg/dL    Sodium 134 (L) 136 - 145 mmol/L    Potassium 3.7 3.5 - 5.3 mmol/L    Chloride 104 98 - 107 mmol/L    Bicarbonate 25 21 - 32 mmol/L    Anion Gap 9 (L) 10 - 20 mmol/L    Urea Nitrogen 10 6 - 23 mg/dL    Creatinine 1.19 0.50 - 1.30 mg/dL    eGFR 67 >60 mL/min/1.73m*2    Calcium 8.1 (L) 8.6 - 10.3 mg/dL   Lipid Panel   Result Value Ref Range    Cholesterol 177 0 - 199 mg/dL    HDL-Cholesterol 39.4 mg/dL    Cholesterol/HDL Ratio 4.5     LDL Calculated 119 (H) <=99 mg/dL    VLDL 18 0 - 40 mg/dL    Triglycerides 91 0 - 149 mg/dL    Non HDL Cholesterol 138 0 - 149 mg/dL   CBC   Result Value Ref Range    WBC 16.4 (H) 4.4 - 11.3 x10*3/uL    nRBC 0.0 0.0 - 0.0 /100 WBCs    RBC 4.64 4.50 - 5.90 x10*6/uL    Hemoglobin 14.1 13.5 - 17.5 g/dL    Hematocrit 40.7 (L) 41.0 - 52.0 %    MCV 88 80 - 100 fL    MCH 30.4 26.0 - 34.0 pg    MCHC 34.6 32.0 - 36.0 g/dL    RDW 14.2 11.5 - 14.5 %    Platelets 218 150 - 450 x10*3/uL   Magnesium   Result Value Ref Range    Magnesium 1.83 1.60 - 2.40 mg/dL   Troponin I, High Sensitivity   Result Value Ref Range    Troponin I, High Sensitivity 23,173 (HH) 0 - 20 ng/L   Renal Function Panel   Result Value Ref Range    Glucose 105 (H) 74  - 99 mg/dL    Sodium 135 (L) 136 - 145 mmol/L    Potassium 3.6 3.5 - 5.3 mmol/L    Chloride 105 98 - 107 mmol/L    Bicarbonate 22 21 - 32 mmol/L    Anion Gap 12 10 - 20 mmol/L    Urea Nitrogen 13 6 - 23 mg/dL    Creatinine 1.20 0.50 - 1.30 mg/dL    eGFR 66 >60 mL/min/1.73m*2    Calcium 8.0 (L) 8.6 - 10.3 mg/dL    Phosphorus 2.5 2.5 - 4.9 mg/dL    Albumin 3.0 (L) 3.4 - 5.0 g/dL   CBC   Result Value Ref Range    WBC 17.8 (H) 4.4 - 11.3 x10*3/uL    nRBC 0.0 0.0 - 0.0 /100 WBCs    RBC 4.40 (L) 4.50 - 5.90 x10*6/uL    Hemoglobin 13.5 13.5 - 17.5 g/dL    Hematocrit 39.0 (L) 41.0 - 52.0 %    MCV 89 80 - 100 fL    MCH 30.7 26.0 - 34.0 pg    MCHC 34.6 32.0 - 36.0 g/dL    RDW 14.5 11.5 - 14.5 %    Platelets 210 150 - 450 x10*3/uL   Magnesium   Result Value Ref Range    Magnesium 1.77 1.60 - 2.40 mg/dL        Medications:  aspirin, 81 mg, oral, Daily  atorvastatin, 80 mg, oral, Nightly  carvedilol, 6.25 mg, oral, BID  enoxaparin, 40 mg, subcutaneous, q24h  pantoprazole, 40 mg, oral, Daily before breakfast   Or  esomeprazole, 40 mg, nasoduodenal tube, Daily before breakfast   Or  pantoprazole, 40 mg, intravenous, Daily before breakfast  nicotine, 1 patch, transdermal, Daily   Followed by  [START ON 2/4/2025] nicotine, 1 patch, transdermal, Daily   Followed by  [START ON 2/18/2025] nicotine, 1 patch, transdermal, Daily  perflutren protein A microsphere, 0.5 mL, intravenous, Once in imaging  polyethylene glycol, 17 g, oral, Daily  sulfur hexafluoride microsphr, 2 mL, intravenous, Once in imaging  tamsulosin, 0.4 mg, oral, Daily  ticagrelor, 90 mg, oral, BID      PRN medications: acetaminophen **OR** acetaminophen **OR** acetaminophen, melatonin, nitroglycerin, oxygen    Transthoracic Echo (TTE) Complete    Result Date: 12/23/2024   [facility], [address] [place] [address]              Tel [phone] and Fax [phone] TRANSTHORACIC ECHOCARDIOGRAM REPORT  Patient Name:       BRADEN Brown  Physician:    [id number] Baron Schafer DO Study Date:         12/23/2024          Ordering Provider:    [id number] JONO ARBOLEDAFIDERITOM MRN/PID:            [id number]            Fellow: Accession#:         [id number]        Nurse: Date of Birth/Age:  1955 / 69      Sonographer:          Kaylan Shahid RDCS                     years Gender assigned at  M                   Additional Staff: Birth: Height:             180.34 cm           Admit Date:           12/23/2024 Weight:             100.70 kg           Admission Status:     Inpatient -                                                               Routine BSA / BMI:          2.20 m2 / 30.96     Encounter#:           [id number]                     kg/m2 Blood Pressure:     182/100 mmHg        Department Location:  [facility] Study Type:    TRANSTHORACIC ECHO (TTE) COMPLETE Diagnosis/ICD: ST elevation (STEMI) myocardial infarction of unspecified                site-I21.3 Indication:    STEMI CPT Code:      Echo Complete w Full Doppler-[id number] Patient History: MI Location/Type:  ST Elevation MI PCI and Stent:     Stent deployed in the OM on 12/23/2024. Pertinent History: HTN and Hyperlipidemia. Study Detail: The following Echo studies were performed: 2D, Doppler, M-Mode and               color flow. Definity used as a contrast agent for endocardial               border definition. Total contrast used for this procedure was 3 mL               via IV push.  PHYSICIAN INTERPRETATION: Left Ventricle: Left ventricular ejection fraction is mildly decreased, calculated by Mcdonald's biplane at 44%. Left venticular wall motion is abnormal. The left ventricular cavity size is normal. There is mildly increased septal and mildly increased posterior left ventricular wall thickness. There is left ventricular concentric remodeling. Spectral Doppler shows an abnormal  pattern of left ventricular diastolic filling. Sludge concerning for early thrombus is noted on contrasted imaging. LV Wall Scoring: The entire lateral wall and mid and apical anterior wall are hypokinetic. Left Atrium: The left atrium is normal in size. Right Ventricle: The right ventricle is normal in size. There is low normal right ventricular systolic function. TAPSe = 10 mm. Right Atrium: The right atrium is normal in size. Aortic Valve: The aortic valve is trileaflet. The aortic valve dimensionless index is 0.82. There is no evidence of aortic valve regurgitation. The peak instantaneous gradient of the aortic valve is 5 mmHg. The mean gradient of the aortic valve is 3 mmHg. Mitral Valve: The mitral valve is mildly thickened. There is trace mitral valve regurgitation. Tricuspid Valve: The tricuspid valve is structurally normal. There is trace tricuspid regurgitation. The Doppler estimated RVSP is within normal limits at 17.5 mmHg. Pulmonic Valve: The pulmonic valve is structurally normal. There is physiologic pulmonic valve regurgitation. Pericardium: There is no pericardial effusion noted. Aorta: The aortic root is abnormal. There is mild dilatation of the ascending aorta. There is mild dilatation of the aortic root. In comparison to the previous echocardiogram(s): There are no prior studies on this patient for comparison purposes.  CONCLUSIONS:  1. Left ventricular ejection fraction is mildly decreased, calculated by Mcdonald's biplane at 44%.  2. Abnormal left venticular wall motion.  3. Entire lateral wall and mid and apical anterior wall are abnormal.  4. Spectral Doppler shows an abnormal pattern of left ventricular diastolic filling.  5. There is low normal right ventricular systolic function.  6. Right ventricular systolic pressure is within normal limits.  7. Sludge concerning for early thrombus is noted on contrasted imaging. QUANTITATIVE DATA SUMMARY:  2D MEASUREMENTS:          Normal Ranges: LAs:              3.19 cm  (2.7-4.0cm) IVSd:            1.20 cm  (0.6-1.1cm) LVPWd:           1.11 cm  (0.6-1.1cm) LVIDd:           4.34 cm  (3.9-5.9cm) LVIDs:           2.99 cm LV Mass Index:   80 g/m2 LVEDV Index:     48 ml/m2 LV % FS          30.9 %  LA VOLUME:                    Normal Ranges: LA Vol A4C:        56.6 ml    (22+/-6mL/m2) LA Vol A2C:        57.6 ml LA Vol BP:         59.5 ml LA Vol Index A4C:  25.7ml/m2 LA Vol Index A2C:  26.1 ml/m2 LA Vol Index BP:   27.0 ml/m2 LA Area A4C:       17.5 cm2 LA Area A2C:       18.4 cm2 LA Major Axis A4C: 4.6 cm LA Major Axis A2C: 5.0 cm LA Volume Index:   27.1 ml/m2 LA Vol A4C:        54.4 ml LA Vol A2C:        51.2 ml LA Vol Index BSA:  23.9 ml/m2  AORTA MEASUREMENTS:         Normal Ranges: Ao Sinus, d:        4.20 cm (2.1-3.5cm) Ao STJ, d:          3.30 cm (1.7-3.4cm) Asc Ao, d:          3.80 cm (2.1-3.4cm)  LV SYSTOLIC FUNCTION BY 2D PLANIMETRY (MOD):                      Normal Ranges: EF-A4C View:    41 % (>=55%) EF-A2C View:    49 % EF-Biplane:     44 % LV EF Reported: 44 %  LV DIASTOLIC FUNCTION:             Normal Ranges: MV Peak E:             0.32 m/s    (0.7-1.2 m/s) MV Peak A:             0.91 m/s    (0.42-0.7 m/s) E/A Ratio:             0.35        (1.0-2.2) MV e'                  0.055 m/s   (>8.0) MV lateral e'          0.05 m/s MV medial e'           0.06 m/s MV A Dur:              114.18 msec E/e' Ratio:            5.80        (<8.0) a'                     0.16 m/s PulmV Sys Aj:         38.53 cm/s PulmV Hernandez Aj:        24.67 cm/s PulmV S/D Aj:         1.51 PulmV A Revs Aj:      19.88 cm/s PulmV A Revs Dur:      95.15 msec  MITRAL VALVE:          Normal Ranges: MV DT:        234 msec (150-240msec)  AORTIC VALVE:                     Normal Ranges: AoV Vmax:                1.13 m/s (<=1.7m/s) AoV Peak P.1 mmHg (<20mmHg) AoV Mean PG:             3.0 mmHg (1.7-11.5mmHg) LVOT Max Aj:            0.97 m/s (<=1.1m/s) AoV VTI:                 19.71  cm (18-25cm) LVOT VTI:                16.19 cm LVOT Diameter:           2.24 cm  (1.8-2.4cm) AoV Area, VTI:           3.24 cm2 (2.5-5.5cm2) AoV Area,Vmax:           3.37 cm2 (2.5-4.5cm2) AoV Dimensionless Index: 0.82  RIGHT VENTRICLE: RV Basal 3.50 cm RV Mid   2.70 cm RV Major 7.7 cm TAPSE:   10.0 mm RV s'    0.08 m/s  TRICUSPID VALVE/RVSP:          Normal Ranges: Peak TR Velocity:     1.90 m/s Est. RA Pressure:     3 mmHg RV Syst Pressure:     17 mmHg  (< 30mmHg) IVC Diam:             1.29 cm  PULMONIC VALVE:          Normal Ranges: RVOT Vmax:      0.60 m/s (0.6-0.9m/s) PV Max Aj:     0.9 m/s  (0.6-0.9m/s) PV Max PG:      3.5 mmHg  Pulmonary Veins: PulmV A Revs Dur: 95.15 msec PulmV A Revs Aj: 19.88 cm/s PulmV Hernandez Aj:   24.67 cm/s PulmV S/D Aj:    1.51 PulmV Sys Aj:    38.53 cm/s  AORTA: Asc Ao Diam 3.84 cm  [id number] Baron Schafer DO Electronically signed on 12/23/2024 at 6:54:03 PM  Wall Scoring  ** Final **     Cardiac Catheterization Procedure    Result Date: 12/23/2024   [facility], Cath Lab, [address] [place] [address] Cardiovascular Catheterization Report Patient Name:     BRADEN BERNAL    Performing Physician:  Tony Rodriguez MD Study Date:       12/23/2024          Verifying Physician:   Tony Rodriguez MD MRN/PID:          [id number]            Cardiologist/Co-Scrub: Accession#:       [id number]        Ordering Provider:     Tony RODRIGUEZ Date of           1955 / 69      Cardiologist: Birth/Age:        years Gender:           M                   Fellow: Encounter#:       [id number]          Surgeon:  Study: Left Heart Cath with PCI  Indications: BRADEN BERNAL is a 70 year old male who presents with hypertension and a chest pain assessment of typical angina. Acute coronary syndrome - STEMI. Medical History: Stress test performed: No. CTA  performed: NoJunior Galvan accessed: No. LVEF Assessed: No. Cardiac arrest: No. Cardiac surgical consult: No. Cardiovascular instability: No. Frailty status of patient entering lab: 6 = Moderately frail.  Procedure Description: After infiltration with 2% Lidocaine, the right radial artery was cannulated with a modified Seldinger technique. Subsequently a 6 [place] sheath was placed in the right radial artery. Selective coronary catheterization was performed using a 5 Fr catheter(s) exchanged over a guide wire to cannulate the coronary arteries. A JL 3.5 tip catheter was used for left coronary injections. A JR 4 tip catheter was used for right coronary injections. Multiple injections of contrast were made into the left and right coronary arteries with angiograms recorded in multiple projections. After completion of the procedure, the arterial sheath was pulled and a TR Band Radial Compression Device was utilized to obtain patent hemostasis.  Coronary Angiography: The coronary circulation is co-dominant.  Left Main Coronary Artery: The left main coronary artery is a normal caliber vessel. The left main arises normally from the left coronary sinus of Valsalva and bifurcates into the LAD and circumflex coronary arteries. The left main coronary artery showed no significant disease or stenosis greater than 30%.  Left Anterior Descending Coronary Artery Distribution: The left anterior descending coronary artery is a normal caliber vessel. The LAD arises normally from the left main coronary artery. The mid left anterior descending coronary artery showed 40% stenosis. An additional lesion, located at the distal left anterior descending coronary artery, revealed 50% stenosis. The 1st diagonal branch showed no significant disease or stenosis greater than 30%. The 2nd diagonal branch is a small caliber vessel. The ostial 2nd diagonal branch showed 50% stenosis. The 3rd diagonal branch revealed no significant disease or stenosis  greater than 30%.  Circumflex Coronary Artery Distribution: The circumflex coronary artery is a normal caliber vessel. The circumflex arises normally from the left main coronary artery and terminates in the AV groove. The circumflex revealed no significant disease or stenosis greater than 30%. The 1st obtuse marginal branch is a small caliber vessel. The 1st obtuse marginal branch showed no significant disease or stenosis greater than 30%. The 2nd obtuse marginal branch is a large caliber vessel. The proximal 2nd obtuse marginal branch demonstrated 100% occlusion of a banch of OM2. Culprit is a branch of OM2 which supplies the lateral wall and part of the inferior wall. The left posterior descending artery is a small caliber vessel. The left posterior descending artery showed no significant disease or stenosis greater than 30%.  Right Coronary Artery Distribution: The right coronary artery is a normal caliber vessel. The RCA arises normally from the right sinus of Valsalva. The proximal and mid to distal right coronary artery showed 50% stenosis. The acute marginal branch showed no significant disease or stenosis greater than 30%. The right posterolateral branch showed no significant disease or stenosis greater than 30%. The right posterior descending artery showed no significant disease or stenosis greater than 30%.  Coronary Interventions: Angiography reveals a 100% stenosis of the proximal branch of OM2 coronary artery. Pre-intervention ANDREY flow was 1. Percutaneous coronary intervention was performed within the proximal branch of OM2. The vessel was pre-dilated using a compliant balloon 2.0 mm x 12 mm [place] GEM 2.5 mm x 22 mm was advanced to the lesion and implanted. The stent was post dilated using a non-compliant balloon 2.5 mm x 12 mm at 24 KIYA. The stenosis was successfully reduced from 100% to 0%. Post-intervention ANDREY flow was 3. We used 6 [place] EBU 3.5 guide catheter for left engagement. We wired the LCx  using a Runthrough wire. We predilated the OM 2 branch using 2.0 compliant balloon, this was followed by the deployment of 2.5 x 22 mm GEM, which was postdilated using 2.5 NC balloon at high pressures. We performed post inflation for prolonged time of 2 minutes to overcome the underexpansion in the proximal segment of the stent. Final angiogram with excellent results and no complications. Patient is chest pain free at the end of the procedure.  Coronary Lesion Summary: Vessel                   Stenosis                   Vessel Segment LAD                    40% stenosis                      mid LAD                    50% stenosis                     distal 2nd Diagonal           50% stenosis                     ostial OM 2         100% occlusion of a banch of OM2          proximal RCA                    50% stenosis           proximal and mid to distal  Hemo Personnel: +----------------+---------+ Name            Duty      +----------------+---------+ Deion Rodriguez MD 1 +----------------+---------+  Hemodynamic Pressures:  +----+---------------------+----------+-------------+--------------+---------+ Site      Date Time      Phase NameSystolic mmHgDiastolic mmHgMean mmHg +----+---------------------+----------+-------------+--------------+---------+   AO12/23/2024 8:01:44 AM  AIR REST          176           110      138 +----+---------------------+----------+-------------+--------------+---------+   AO12/23/2024 8:02:24 AM  AIR REST          157            98      120 +----+---------------------+----------+-------------+--------------+---------+   AO12/23/2024 8:02:31 AM  AIR REST            0            84      114 +----+---------------------+----------+-------------+--------------+---------+   AO12/23/2024 8:16:11 AM  AIR REST          166            83      122 +----+---------------------+----------+-------------+--------------+---------+   AO12/23/2024 8:34:10 AM   AIR REST          181           108      136 +----+---------------------+----------+-------------+--------------+---------+  Complications: No in-lab complications observed.  Cardiac Cath Post Procedure Notes: Post Procedure Diagnosis: See below. Blood Loss:               Estimated blood loss during the procedure was 5 mls. Specimens Removed:        Number of specimen(s) removed: none. ____________________________________________________________________________________ CONCLUSIONS:  1. 99% thrombotic occlusion of a branch of OM 2, otherwise mild to moderate CAD elsewhere in a co_dominant system. This OM 2 branch supplies part of the lateral and inferior walls which explains his massive inferior and lateral ST elevation on EKG at presentation.  2. Status post successful PCI of OM2 branch using 2.5 x 22 mm GEM [postdilated using 2.5 NC balloon at high pressure and for 2 minutes].  3. Aspirin 81 mg once daily for life and ticagrelor 90 mg twice daily for at least 12 months. ICD 10 Codes: ST elevation (STEMI) myocardial infarction involving left anterior descending coronary artery-I21.02  CPT Codes: Revasc during AMI stent/angio/atherc,ins asp Thrombectomy, Left Anterior Descending single Vessel (PCI)-32116.LD; Left Heart Cath (visualization of coronaries) and LV-65719  [id number] Deion Rodriguez MD Performing Physician Electronically signed by [id number] Rodriguez MD on 12/23/2024 at 10:50:09 AM  ** Final (Updated) **     ECG 12 Lead    Result Date: 12/23/2024  Sinus rhythm with Premature supraventricular complexes Right bundle branch block Lateral infarct , new Inferior infarct , age undetermined ** ** ACUTE MI / STEMI ** ** Abnormal ECG When compared with ECG of 04-JAN-2012 11:48, Premature supraventricular complexes are now Present Right bundle branch block is now Present Acute Lateral infarct is now Present Inferior infarct is now Present    Electrocardiogram 12 Lead    Result Date: 12/23/2024  Sinus rhythm with  Premature atrial complexes with Aberrant conduction Right bundle branch block Inferior infarct (cited on or before 23-DEC-2024) Anterolateral infarct (cited on or before 23-DEC-2024) Abnormal ECG When compared with ECG of 23-DEC-2024 09:35, No significant change was found      Assessment/Plan   Assessment & Plan  ST elevation myocardial infarction (STEMI), unspecified artery (Multi)    69-year-old -American male, who is known to history of hypertension, hyperlipidemia, smoker, occasional EtOH use, BPH, comes to the emergency department with severe chest pain, diagnosed with NSTEMI, he was taken to Cath Lab, had a stent done, to the  2, he is being transferred here for further management.  He was at ICU and transferred here now.  NSTEMI, stable now, continuing the aspirin Brilinta and statin.  Hematuria, after traumatic catheterization, monitoring, urology on board,.  Leukocytosis, ordered the cultures, chest x-ray, we will monitor.  Hypertension same  Smoking advised to quit smoking.  DVT prophylaxis       I spent 35 minutes in the professional and overall care of this patient.    Hernán Thacker MD

## 2024-12-25 NOTE — PROGRESS NOTES
Subjective Data:  Patient seen and examined, chart reviewed   -- Ambulating on unit  -- NO recurrent chest pain at this time   -- Telemetry shows insu rhyth       Objective Data:  Last Recorded Vitals:  Vitals:    12/25/24 0500 12/25/24 0511 12/25/24 0600 12/25/24 0936   BP:    121/87   BP Location:       Patient Position:       Pulse: 77 81 74 85   Resp: 22 14 15 17   Temp:  36.6 °C (97.9 °F)     TempSrc:  Temporal     SpO2:  94%     Weight:       Height:           Last Labs:  CBC - 12/25/2024:  5:45 AM  17.8 13.5 210    39.0      CMP - 12/25/2024:  5:45 AM  8.0 8.0 79 --- 0.6   2.5 3.0 20 74      PTT - 12/23/2024:  7:49 AM  1.0   11.5 31     TROPHS   Date/Time Value Ref Range Status   12/24/2024 06:50 AM 23,173 0 - 20 ng/L Final   12/23/2024 07:49 AM 8,174 0 - 20 ng/L Final     HGBA1C   Date/Time Value Ref Range Status   12/24/2024 06:50 AM 5.8 See comment % Final   03/14/2023 09:46 AM 5.8 % Final     Comment:          Diagnosis of Diabetes-Adults   Non-Diabetic: < or = 5.6%   Increased risk for developing diabetes: 5.7-6.4%   Diagnostic of diabetes: > or = 6.5%  .       Monitoring of Diabetes                Age (y)     Therapeutic Goal (%)   Adults:          >18           <7.0   Pediatrics:    13-18           <7.5                   7-12           <8.0                   0- 6            7.5-8.5   American Diabetes Association. Diabetes Care 33(S1), Jan 2010.     LDLCALC   Date/Time Value Ref Range Status   12/24/2024 06:50  <=99 mg/dL Final     Comment:                                 Near   Borderline      AGE      Desirable  Optimal    High     High     Very High     0-19 Y     0 - 109     ---    110-129   >/= 130     ----    20-24 Y     0 - 119     ---    120-159   >/= 160     ----      >24 Y     0 -  99   100-129  130-159   160-189     >/=190       VLDL   Date/Time Value Ref Range Status   12/24/2024 06:50 AM 18 0 - 40 mg/dL Final   03/14/2023 09:46 AM 21 0 - 40 mg/dL Final      Last I/O:  I/O last 3  completed shifts:  In: 720 (7.2 mL/kg) [P.O.:720]  Out: 855 (8.5 mL/kg) [Urine:855 (0.2 mL/kg/hr)]  Weight: 100.7 kg     Past Cardiology Tests (Last 3 Years):  EKG:  Electrocardiogram 12 Lead 12/23/2024 (Preliminary)      ECG 12 Lead 12/23/2024 (Preliminary)    Echo:  Transthoracic Echo (TTE) Complete 12/23/2024    Ejection Fractions:  EF   Date/Time Value Ref Range Status   12/23/2024 03:18 PM 44 %      Cath:  Cardiac Catheterization Procedure 12/23/2024    Stress Test:  No results found for this or any previous visit from the past 1095 days.    Cardiac Imaging:  No results found for this or any previous visit from the past 1095 days.      Inpatient Medications:  Scheduled medications   Medication Dose Route Frequency    aspirin  81 mg oral Daily    atorvastatin  80 mg oral Nightly    carvedilol  6.25 mg oral BID    cefTRIAXone  1 g intravenous q24h    enoxaparin  40 mg subcutaneous q24h    pantoprazole  40 mg oral Daily before breakfast    Or    esomeprazole  40 mg nasoduodenal tube Daily before breakfast    Or    pantoprazole  40 mg intravenous Daily before breakfast    nicotine  1 patch transdermal Daily    Followed by    [START ON 2/4/2025] nicotine  1 patch transdermal Daily    Followed by    [START ON 2/18/2025] nicotine  1 patch transdermal Daily    perflutren protein A microsphere  0.5 mL intravenous Once in imaging    polyethylene glycol  17 g oral Daily    sulfur hexafluoride microsphr  2 mL intravenous Once in imaging    tamsulosin  0.4 mg oral Daily    ticagrelor  90 mg oral BID     PRN medications   Medication    acetaminophen    Or    acetaminophen    Or    acetaminophen    magnesium hydroxide    melatonin    nitroglycerin    oxygen     Continuous Medications   Medication Dose Last Rate       Physical Exam:  Documented Vital Signs   Heart Rate:  [73-85]   Temp:  [36.4 °C (97.5 °F)-37.8 °C (100.1 °F)]   Resp:  [14-22]   BP: (118-161)/(72-97)   SpO2:  [94 %-100 %]   Temp:  [36.4 °C (97.5 °F)-37.8 °C  "(100.1 °F)] 36.6 °C (97.9 °F)  Heart Rate:  [73-85] 85  Resp:  [14-22] 17  BP: (118-161)/(72-97) 121/87      Oxygen Dose: *3 L/min    Documented Fluid Status     Intake/Output Summary (Last 24 hours) at 12/25/2024 1513  Last data filed at 12/25/2024 0941  Gross per 24 hour   Intake 720 ml   Output 545 ml   Net 175 ml     Net IO Since Admission: -1,075 mL [12/25/24 1513]    /87   Pulse 85   Temp 36.6 °C (97.9 °F) (Temporal)   Resp 17   Ht 1.803 m (5' 11\")   Wt 101 kg (222 lb)   SpO2 94%   BMI 30.96 kg/m²   General:  Patient is awake, alert, and oriented.  Patient is in no acute distress.  HEENT:  Pupils equal and reactive.  Normocephalic.  Moist mucosa.    Neck:  No thyromegaly.  Normal Jugular Venous Pressure.  Cardiovascular:  Regular rate and rhythm.  Normal S1 and S2.  1/6 DONNY.  Pulmonary:  Clear to auscultation bilaterally.  Abdomen:  Soft. Non-tender.   Non-distended.  Positive bowel sounds.  Lower Extremities:  2+ pedal pulses. No LE edema.  Neurologic:  Cranial nerves intact.  No focal deficit.   Skin: Skin warm and dry, normal skin turgor.   Psychiatric: Normal affect.             Assessment/Plan   Peña Arenas is a 69 y.o. male with past medical history significant for Hypertension, Dyslipidemia, BPH. Presented with chest pain. Cardiology is consulted for STEMI s/p PCI to OM.       #Inferior and lateral STEMI  -Patient presented with chest discomfort as yesterday.  ECG on presentation showed normal sinus rhythm with ST elevation in inferior and lateral leads.  He was taken for urgent coronary angiogram which revealed 99% thrombotic occlusion of the branch of OM 2 otherwise mild to moderate CAD elsewhere with subsequent PCI 2.5 22 mm GEM.  -Continue DAPT with aspirin 81 mg daily and Brilinta 90 mg twice daily  -Atorvastatin 80 mg daily  -Agree with Coreg 6.25 mg/day for BP and heart rate controlled  - Repeat Limited echocardiogram due to swirl artifact with concern for Early Thrombus " Development   -Check lipid panel and hemoglobin A1c     #HTN  Elevated  Has been off his medications for at least 8 months   Agree with Coreg 6.25 mg BID     #DLD  -Last  on 3/14/2023  -Start atorvastatin 80 mg daily  -Repeat lipid panel            The patient will benefit from follow-up in Cardiology clinic within 3-5 weeks of discharge.  The patient or their family should call the [facility] at [phone] for [facility].  If the care team is assisting in scheduling a follow up appointment prior to their discharge, please ensure that the patient has committed to attending the scheduled date and time.     Thank you for allowing me to participate in their care.  Please feel free to call me with any further questions or concerns.  Peripheral IV 12/23/24 18 G Distal;Left;Upper Arm (Active)   Site Assessment Clean;Dry;Intact 12/25/24 0900   Dressing Status Clean;Dry 12/25/24 0900   Number of days: 2       Urethral Catheter 20 Fr. (Active)   Site Assessment Clean;Skin intact 12/25/24 1244   Number of days: 2       Code Status:  Full Code      Baron Schafer DO   Division of Cardiovascular Medicine  [facility]

## 2024-12-26 ENCOUNTER — APPOINTMENT (OUTPATIENT)
Dept: CARDIOLOGY | Facility: HOSPITAL | Age: 69
End: 2024-12-26
Payer: COMMERCIAL

## 2024-12-26 LAB
ANION GAP SERPL CALC-SCNC: 11 MMOL/L (ref 10–20)
ATRIAL RATE: 88 BPM
BUN SERPL-MCNC: 15 MG/DL (ref 6–23)
CALCIUM SERPL-MCNC: 7.8 MG/DL (ref 8.6–10.3)
CHLORIDE SERPL-SCNC: 103 MMOL/L (ref 98–107)
CO2 SERPL-SCNC: 25 MMOL/L (ref 21–32)
CREAT SERPL-MCNC: 1.28 MG/DL (ref 0.5–1.3)
EGFRCR SERPLBLD CKD-EPI 2021: 61 ML/MIN/1.73M*2
EJECTION FRACTION APICAL 4 CHAMBER: 43.8
EJECTION FRACTION: 48 %
ERYTHROCYTE [DISTWIDTH] IN BLOOD BY AUTOMATED COUNT: 14.6 % (ref 11.5–14.5)
GLUCOSE SERPL-MCNC: 129 MG/DL (ref 74–99)
HCT VFR BLD AUTO: 36.7 % (ref 41–52)
HGB BLD-MCNC: 12.7 G/DL (ref 13.5–17.5)
LEFT VENTRICLE INTERNAL DIMENSION DIASTOLE: 4.18 CM (ref 3.5–6)
MCH RBC QN AUTO: 31.1 PG (ref 26–34)
MCHC RBC AUTO-ENTMCNC: 34.6 G/DL (ref 32–36)
MCV RBC AUTO: 90 FL (ref 80–100)
NRBC BLD-RTO: 0 /100 WBCS (ref 0–0)
P AXIS: 44 DEGREES
P OFFSET: 185 MS
P ONSET: 129 MS
PLATELET # BLD AUTO: 209 X10*3/UL (ref 150–450)
POTASSIUM SERPL-SCNC: 3.7 MMOL/L (ref 3.5–5.3)
PR INTERVAL: 166 MS
Q ONSET: 212 MS
QRS COUNT: 14 BEATS
QRS DURATION: 122 MS
QT INTERVAL: 408 MS
QTC CALCULATION(BAZETT): 493 MS
QTC FREDERICIA: 463 MS
R AXIS: 67 DEGREES
RBC # BLD AUTO: 4.08 X10*6/UL (ref 4.5–5.9)
RIGHT VENTRICLE PEAK SYSTOLIC PRESSURE: 26.8 MMHG
SODIUM SERPL-SCNC: 135 MMOL/L (ref 136–145)
T AXIS: 60 DEGREES
T OFFSET: 416 MS
VENTRICULAR RATE: 88 BPM
WBC # BLD AUTO: 14.1 X10*3/UL (ref 4.4–11.3)

## 2024-12-26 PROCEDURE — 2500000001 HC RX 250 WO HCPCS SELF ADMINISTERED DRUGS (ALT 637 FOR MEDICARE OP): Performed by: NURSE PRACTITIONER

## 2024-12-26 PROCEDURE — 93325 DOPPLER ECHO COLOR FLOW MAPG: CPT | Performed by: INTERNAL MEDICINE

## 2024-12-26 PROCEDURE — 93308 TTE F-UP OR LMTD: CPT | Performed by: INTERNAL MEDICINE

## 2024-12-26 PROCEDURE — 85027 COMPLETE CBC AUTOMATED: CPT | Performed by: INTERNAL MEDICINE

## 2024-12-26 PROCEDURE — 99232 SBSQ HOSP IP/OBS MODERATE 35: CPT

## 2024-12-26 PROCEDURE — 2500000002 HC RX 250 W HCPCS SELF ADMINISTERED DRUGS (ALT 637 FOR MEDICARE OP, ALT 636 FOR OP/ED): Performed by: NURSE PRACTITIONER

## 2024-12-26 PROCEDURE — 99233 SBSQ HOSP IP/OBS HIGH 50: CPT | Performed by: INTERNAL MEDICINE

## 2024-12-26 PROCEDURE — RXMED WILLOW AMBULATORY MEDICATION CHARGE

## 2024-12-26 PROCEDURE — 36415 COLL VENOUS BLD VENIPUNCTURE: CPT | Performed by: INTERNAL MEDICINE

## 2024-12-26 PROCEDURE — 2500000001 HC RX 250 WO HCPCS SELF ADMINISTERED DRUGS (ALT 637 FOR MEDICARE OP)

## 2024-12-26 PROCEDURE — S4991 NICOTINE PATCH NONLEGEND: HCPCS | Performed by: NURSE PRACTITIONER

## 2024-12-26 PROCEDURE — 93321 DOPPLER ECHO F-UP/LMTD STD: CPT | Performed by: INTERNAL MEDICINE

## 2024-12-26 PROCEDURE — 80048 BASIC METABOLIC PNL TOTAL CA: CPT | Performed by: INTERNAL MEDICINE

## 2024-12-26 PROCEDURE — 2500000004 HC RX 250 GENERAL PHARMACY W/ HCPCS (ALT 636 FOR OP/ED): Performed by: INTERNAL MEDICINE

## 2024-12-26 PROCEDURE — 2500000001 HC RX 250 WO HCPCS SELF ADMINISTERED DRUGS (ALT 637 FOR MEDICARE OP): Performed by: INTERNAL MEDICINE

## 2024-12-26 PROCEDURE — 1100000001 HC PRIVATE ROOM DAILY

## 2024-12-26 PROCEDURE — 2500000002 HC RX 250 W HCPCS SELF ADMINISTERED DRUGS (ALT 637 FOR MEDICARE OP, ALT 636 FOR OP/ED): Performed by: INTERNAL MEDICINE

## 2024-12-26 PROCEDURE — 2500000004 HC RX 250 GENERAL PHARMACY W/ HCPCS (ALT 636 FOR OP/ED): Performed by: NURSE PRACTITIONER

## 2024-12-26 PROCEDURE — 93325 DOPPLER ECHO COLOR FLOW MAPG: CPT

## 2024-12-26 RX ORDER — CLOPIDOGREL BISULFATE 75 MG/1
600 TABLET ORAL ONCE
Status: COMPLETED | OUTPATIENT
Start: 2024-12-26 | End: 2024-12-26

## 2024-12-26 RX ORDER — LACTULOSE 10 G/15ML
20 SOLUTION ORAL 2 TIMES DAILY
Status: DISCONTINUED | OUTPATIENT
Start: 2024-12-26 | End: 2024-12-27 | Stop reason: HOSPADM

## 2024-12-26 RX ORDER — CLOPIDOGREL BISULFATE 75 MG/1
75 TABLET ORAL DAILY
Status: DISCONTINUED | OUTPATIENT
Start: 2024-12-27 | End: 2024-12-27 | Stop reason: HOSPADM

## 2024-12-26 RX ORDER — LOSARTAN POTASSIUM 25 MG/1
12.5 TABLET ORAL DAILY
Qty: 15 TABLET | Refills: 1 | OUTPATIENT
Start: 2024-12-27 | End: 2025-02-25

## 2024-12-26 RX ORDER — SPIRONOLACTONE 25 MG/1
12.5 TABLET ORAL DAILY
Qty: 15 TABLET | Refills: 1 | OUTPATIENT
Start: 2024-12-27 | End: 2025-02-25

## 2024-12-26 RX ORDER — CLOPIDOGREL BISULFATE 75 MG/1
75 TABLET ORAL DAILY
Qty: 30 TABLET | Refills: 11 | Status: SHIPPED | OUTPATIENT
Start: 2024-12-26 | End: 2025-12-26

## 2024-12-26 RX ADMIN — PANTOPRAZOLE SODIUM 40 MG: 40 TABLET, DELAYED RELEASE ORAL at 06:43

## 2024-12-26 RX ADMIN — SPIRONOLACTONE 12.5 MG: 25 TABLET ORAL at 08:02

## 2024-12-26 RX ADMIN — NICOTINE 1 PATCH: 21 PATCH, EXTENDED RELEASE TRANSDERMAL at 08:03

## 2024-12-26 RX ADMIN — LOSARTAN POTASSIUM 12.5 MG: 25 TABLET, FILM COATED ORAL at 08:03

## 2024-12-26 RX ADMIN — CARVEDILOL 6.25 MG: 6.25 TABLET, FILM COATED ORAL at 08:02

## 2024-12-26 RX ADMIN — POLYETHYLENE GLYCOL 3350 17 G: 17 POWDER, FOR SOLUTION ORAL at 08:03

## 2024-12-26 RX ADMIN — CARVEDILOL 6.25 MG: 6.25 TABLET, FILM COATED ORAL at 20:58

## 2024-12-26 RX ADMIN — CEFTRIAXONE SODIUM 1 G: 1 INJECTION, SOLUTION INTRAVENOUS at 16:41

## 2024-12-26 RX ADMIN — LACTULOSE 20 G: 20 SOLUTION ORAL at 20:58

## 2024-12-26 RX ADMIN — CLOPIDOGREL 600 MG: 75 TABLET ORAL at 11:36

## 2024-12-26 RX ADMIN — APIXABAN 5 MG: 5 TABLET, FILM COATED ORAL at 20:58

## 2024-12-26 RX ADMIN — LACTULOSE 20 G: 20 SOLUTION ORAL at 11:37

## 2024-12-26 RX ADMIN — ATORVASTATIN CALCIUM 80 MG: 80 TABLET, FILM COATED ORAL at 20:58

## 2024-12-26 RX ADMIN — PERFLUTREN 10 ML OF DILUTION: 6.52 INJECTION, SUSPENSION INTRAVENOUS at 08:36

## 2024-12-26 RX ADMIN — ASPIRIN 81 MG 81 MG: 81 TABLET ORAL at 08:02

## 2024-12-26 RX ADMIN — TAMSULOSIN HYDROCHLORIDE 0.4 MG: 0.4 CAPSULE ORAL at 08:03

## 2024-12-26 RX ADMIN — TICAGRELOR 90 MG: 90 TABLET ORAL at 08:02

## 2024-12-26 ASSESSMENT — COGNITIVE AND FUNCTIONAL STATUS - GENERAL
MOBILITY SCORE: 24
DAILY ACTIVITIY SCORE: 24

## 2024-12-26 ASSESSMENT — PAIN SCALES - GENERAL: PAINLEVEL_OUTOF10: 0 - NO PAIN

## 2024-12-26 ASSESSMENT — PAIN - FUNCTIONAL ASSESSMENT: PAIN_FUNCTIONAL_ASSESSMENT: 0-10

## 2024-12-26 NOTE — PROGRESS NOTES
Braden Bernal is a 69 y.o. male on day 3 of admission presenting with ST elevation myocardial infarction (STEMI), unspecified artery (Multi).      Subjective   Patient seen and examined, resting in the bed, awake, alert, and oriented x 4. Denies any pain or discomfort. Wagner catheter draining pink urine. He states he has bowel movement today. Encouraged oral fluids. Denies fever, chills, chest pain, shortness of breath, and nausea/vomiting/diarrhea.     Objective     Last Recorded Vitals  /82   Pulse 71   Temp 36.6 °C (97.9 °F) (Temporal)   Resp 18   Wt 101 kg (222 lb)   SpO2 99%   Intake/Output last 3 Shifts:    Intake/Output Summary (Last 24 hours) at 12/26/2024 1734  Last data filed at 12/26/2024 1606  Gross per 24 hour   Intake 480 ml   Output 2070 ml   Net -1590 ml       Admission Weight  Weight: 93.2 kg (205 lb 6.4 oz) (12/23/24 0931)    Daily Weight  12/23/24 : 101 kg (222 lb)    Image Results  Transthoracic Echo (TTE) [facility], [address] [place] [address]               Tel [phone] and Fax [phone]    TRANSTHORACIC ECHOCARDIOGRAM REPORT       Patient Name:       BRADEN BERNAL    Reading Physician:    [id number] Christian DO  Study Date:         12/26/2024          Ordering Provider:    [id number] CHRISTIAN  MRN/PID:            [id number]            Fellow:  Accession#:         [id number]        Nurse:  Date of Birth/Age:  1955 / 69      Sonographer:          Rosa Vasques RDCS                      years  Gender assigned at                     Additional Staff:     Garret Paige  Birth:  Height:             180.34 cm           Admit Date:           12/23/2024  Weight:             100.70 kg           Admission Status:     Inpatient -                                                                Priority                                                                 discharge  BSA / BMI:          2.20 m2 / 30.96     Encounter#:           [id number]                      kg/m2  Blood Pressure:     124/84 mmHg         Department Location:  [facility] Non                                                                Invasive    Study Type:    TRANSTHORACIC ECHO (TTE) LIMITED  Diagnosis/ICD: Subsequent ST elevation (STEMI) myocardial infarction of                 unspecified site-I22.9  Indication:    reassess for thrombus s/p STEMI  CPT Code:      Echo Limited-15953; Doppler Limited-59590; Color Doppler-47290    Patient History:  MI Location/Type:  ST Elevation MI  PCI and Stent:     Stent deployed in the OM on 12/23/2024.  Pertinent History: HTN and Hyperlipidemia.    Study Detail: The following Echo studies were performed: 2D, M-Mode and Doppler.                Definity used as a contrast agent for endocardial border                definition. Total contrast used for this procedure was 3 mL via IV                push.       PHYSICIAN INTERPRETATION:  Left Ventricle: Left ventricular ejection fraction is mildly decreased, by visual estimate at 45-50%. Left venticular wall motion is abnormal. The left ventricular cavity size is normal. There is mildly increased septal and normal posterior left ventricular wall thickness. There is left ventricular concentric remodeling. Left ventricular diastolic filling was not assessed. Early LV Apical Thrombus development is appreciated.  LV Wall Scoring:  The mid and apical anterior wall, basal and mid inferolateral wall, mid  anterolateral segment, and apical lateral segment are hypokinetic.    Left Atrium: The left atrium is normal in size.  Right Ventricle: The right ventricle is normal in size. Right ventricular systolic function not assessed.  Right Atrium: The right atrium was not assessed.  Aortic Valve: The aortic valve is trileaflet. There is no evidence of aortic valve  regurgitation.  Mitral Valve: The mitral valve is mildly thickened. There is trace mitral valve regurgitation.  Tricuspid Valve: The tricuspid valve is structurally normal. There is trace to mild tricuspid regurgitation. The Doppler estimated RVSP is within normal limits at 26.8 mmHg.  Pulmonic Valve: The pulmonic valve is structurally normal. Pulmonic valve regurgitation was not assessed.  Pericardium: There is no pericardial effusion noted.  Aorta: The aortic root is normal.  Systemic Veins: The inferior vena cava appears normal in size, with IVC inspiratory collapse greater than 50%.  In comparison to the previous echocardiogram(s): Compared with study dated 12/23/2024,. The left ventricular function is unchanged. The left ventricular hypertrophy is unchanged. The left ventricular diastolic function is unchanged. The left ventricular wall motion abnormalities are unchanged.       CONCLUSIONS:   1. This is a limited study to assess for LV Thrombus after STEMI.   2. Left ventricular ejection fraction is mildly decreased, by visual estimate at 45-50%.   3. Abnormal left venticular wall motion.   4. Mid and apical anterior wall, basal and mid inferolateral wall, mid anterolateral segment, and apical lateral segment are abnormal.   5. Early LV Apical Thrombus development is appreciated.   6. Right ventricular systolic pressure is within normal limits.    QUANTITATIVE DATA SUMMARY:     2D MEASUREMENTS:          Normal Ranges:  IVSd:            1.18 cm  (0.6-1.1cm)  LVPWd:           0.96 cm  (0.6-1.1cm)  LVIDd:           4.18 cm  (3.9-5.9cm)  LVIDs:           2.92 cm  LV Mass Index:   68 g/m2  LVEDV Index:     49 ml/m2  LV % FS          30.2 %       LV SYSTOLIC FUNCTION BY 2D PLANIMETRY (MOD):                       Normal Ranges:  EF-A4C View:    44 % (>=55%)  EF-A2C View:    48 %  EF-Biplane:     60 %  EF-Visual:      48 %  LV EF Reported: 48 %       TRICUSPID VALVE/RVSP:          Normal Ranges:  Peak TR Velocity:      2.44 m/s  Est. RA Pressure:     3 mmHg  RV Syst Pressure:     27 mmHg  (< 30mmHg)  IVC Diam:             2.04 cm       [id number] Baron Gilmar PANG  Electronically signed on 12/26/2024 at 10:44:08 AM       Wall Scoring       ** Final **  XR chest 2 views  Narrative: Interpreted By:  Rubens Pierce,   STUDY:  XR CHEST 2 VIEWS;  12/25/2024 9:51 am      INDICATION:  Signs/Symptoms:leukocytosis.      COMPARISON:  Correlation with CT scan abdomen and pelvis from 09/20/2015.      ACCESSION NUMBER(S):  [id number]      ORDERING CLINICIAN:  CORINA WEIR      TECHNIQUE:  PA and lateral views of the chest were obtained.      FINDINGS:  MEDIASTINUM/LUNGS/YOJANA:  No cardiomegaly, vascular congestion, or pleural effusion.  There is subtle hazy increased opacity at the posterolateral left  lung base. Right lung is clear. Mild tortuosity of the descending  thoracic aorta. No pneumothorax.  No tracheal deviation.  No abnormal hilar fullness or gross mass on either side.      BONES:  No lytic or blastic destructive bone lesion.      UPPER ABDOMEN:  Moderate stool in the imaged upper colon.      Impression: Suspicion of mild or early pneumonia at the posterolateral left lung  base.      MACRO:  None      Signed by: Rubens Pierce 12/26/2024 8:14 AM  Dictation workstation:   [id number]      Physical Exam  Constitutional:       General: He is not in acute distress.     Appearance: He is obese. He is not diaphoretic.   Cardiovascular:      Rate and Rhythm: Normal rate and regular rhythm.      Pulses: Normal pulses.   Pulmonary:      Effort: Pulmonary effort is normal. No respiratory distress.      Breath sounds: Normal breath sounds.   Chest:      Chest wall: No tenderness.   Abdominal:      General: Bowel sounds are normal. There is distension.      Tenderness: There is no abdominal tenderness.   Genitourinary:     Comments: Mcwilliams catheter draining pink urine, urology follows.   Will discontinue mcwilliams catheter in am and follow  TOV  Musculoskeletal:      Right lower leg: No edema.      Left lower leg: No edema.   Skin:     General: Skin is dry.      Capillary Refill: Capillary refill takes less than 2 seconds.   Neurological:      Mental Status: He is alert and oriented to person, place, and time.   Psychiatric:         Mood and Affect: Mood normal.         Relevant Results             Results for orders placed or performed during the hospital encounter of 12/23/24 (from the past 24 hours)  CBC  Result Value Ref Range   WBC 14.1 (H) 4.4 - 11.3 x10*3/uL   nRBC 0.0 0.0 - 0.0 /100 WBCs   RBC 4.08 (L) 4.50 - 5.90 x10*6/uL   Hemoglobin 12.7 (L) 13.5 - 17.5 g/dL   Hematocrit 36.7 (L) 41.0 - 52.0 %   MCV 90 80 - 100 fL   MCH 31.1 26.0 - 34.0 pg   MCHC 34.6 32.0 - 36.0 g/dL   RDW 14.6 (H) 11.5 - 14.5 %   Platelets 209 150 - 450 x10*3/uL  Basic Metabolic Panel  Result Value Ref Range   Glucose 129 (H) 74 - 99 mg/dL   Sodium 135 (L) 136 - 145 mmol/L   Potassium 3.7 3.5 - 5.3 mmol/L   Chloride 103 98 - 107 mmol/L   Bicarbonate 25 21 - 32 mmol/L   Anion Gap 11 10 - 20 mmol/L   Urea Nitrogen 15 6 - 23 mg/dL   Creatinine 1.28 0.50 - 1.30 mg/dL   eGFR 61 >60 mL/min/1.73m*2   Calcium 7.8 (L) 8.6 - 10.3 mg/dL  Transthoracic Echo (TTE) Limited  Result Value Ref Range   LV EF 48 %   LVIDd 4.18 cm   RVSP 26.8 mmHg   LV A4C EF 43.8       Assessment/Plan      This patient has a urinary catheter   Reason for the urinary catheter remaining today? We will discontinue mcwilliams catheter in am at 0500 and follow voiding trial.     He is 69-year-old -American male, who is known to history of hypertension, hyperlipidemia, smoker, occasional EtOH use, BPH, comes to the emergency department with severe chest pain, diagnosed with NSTEMI. ECG on presentation showed normal sinus rhythm with ST elevation. Coronary angiogram revealed 99% thrombotic occlusion of the branch of OM 2. High sensitivity Troponin 8,174, and 23,173. Thrombus formation on Echocardiogram. He had  Cardiac catheterization procedure and a stent placementon on 12/23/2024.    TTE on 12/23/2024   1. Left ventricular ejection fraction is mildly decreased, calculated by Mcdonald's biplane at 44%.   2. Abnormal left venticular wall motion.   3. Entire lateral wall and mid and apical anterior wall are abnormal.   4. Spectral Doppler shows an abnormal pattern of left ventricular diastolic filling.   5. There is low normal right ventricular systolic function.   6. Right ventricular systolic pressure is within normal limits.   7. Sludge concerning for early thrombus is noted on contrasted imaging.    Repeated TTE on 12/26/2024   1. This is a limited study to assess for LV Thrombus after STEMI.   2. Left ventricular ejection fraction is mildly decreased, by visual estimate at 45-50%.   3. Abnormal left venticular wall motion.   4. Mid and apical anterior wall, basal and mid inferolateral wall, mid anterolateral segment, and apical lateral segment are abnormal.   5. Early LV Apical Thrombus development is appreciated.   6. Right ventricular systolic pressure is within normal limits.    ECG showed normal sinus rhythm with ST elevation   Coronary angiogram revealed 99% thrombotic occlusion of the branch of OM 2   High sensitivity Troponin 8,174, and 23,173  Assessment & Plan  ST elevation myocardial infarction (STEMI), unspecified artery (Multi)    # ST Elevation myocardial infarction (STEMI)  - ECG on presentation showed normal sinus rhythm with ST elevation   - Coronary angiogram revealed 99% thrombotic occlusion of the branch of OM 2   - High sensitivity Troponin 8,174, and 23,173  - S / P Cardiac catheterization procedure and a stent placementon on 12/23/2024  - Thrombus formation on Echocardiogram   - Continue ACS Regimen, Clopidogrel 75 mg every day and Apixaban 5 mg BID  - Monitor on tele monitor    - EKG as needed for ACS  - Supplemental Oxygen as needed   - Nitroglycerin SL as needed for ACS    # Hypertension  -  Continue Coreg 6.25 mg BID, Losartan 12.5 mg daily and Spironolactone 12.5 mg daily   - Monitor blood pressure, adjust medications as warranted   - Monitor labs     # Hyperlipidemia   - Continue high intensity statins  - ; goal LDL <= 100     # UTI   - Urinalysis positive for leukocyte esterase and protein   - Pending blood cultures and urine culture   - Continue IV Ceftriaxone 1 gm   - Hematuria, mcwilliams catheter present, urology follows  - Planing to DC mcwilliams in am and follow void trial    # Nicotine dependence  - Encourage smoking cessation   - Nicotine patch    # DVT / GI prophylaxis  - SCD   - Eliquis   - Encouraged ambulation   - PPI   - Bowel regimen    # Discharge planing  - Discharge home when medically and hemodynamically stable  - Can be discharged once Clopidogrel Load has been completed and he has received his 1st dose of Apixaban on 12/26/2024  - Will discontinue mcwilliams catheter in am at 0500 and follow TOV  - Follow up with cardiology within 3 - 5 weeks upon discharge. The patient or their family should call the [facility] at [phone] for [facility]        JHOANA Linares-CNP

## 2024-12-26 NOTE — PROGRESS NOTES
Subjective Data:  Patient seen and examined, chart reviewed   -- Ambulating on unit, Transferring to 7th floor   -- No complaints,   -- Repeated echocardiogram showing thrombus formation       Objective Data:  Last Recorded Vitals:  Vitals:    12/25/24 2321 12/26/24 0000 12/26/24 0100 12/26/24 0413   BP: 122/81   124/84   BP Location: Left arm   Left arm   Patient Position: Other (Comment)   Other (Comment)   Pulse: 72 70 68 69   Resp: 23 22 16 19   Temp: 36.6 °C (97.8 °F)   37.7 °C (99.9 °F)   TempSrc: Temporal   Temporal   SpO2: 96% 95% 96% 94%   Weight:       Height:           Last Labs:  Results from last 7 days   Lab Units 12/26/24  0520 12/25/24  0545 12/24/24  0650   WBC AUTO x10*3/uL 14.1* 17.8* 16.4*   HEMOGLOBIN g/dL 12.7* 13.5 14.1   HEMATOCRIT % 36.7* 39.0* 40.7*   PLATELETS AUTO x10*3/uL 209 210 218     Results from last 7 days   Lab Units 12/26/24  0520 12/25/24  0545 12/24/24  0650 12/23/24  0749   SODIUM mmol/L 135* 135* 134* 137   POTASSIUM mmol/L 3.7 3.6 3.7 3.8   CHLORIDE mmol/L 103 105 104 101   CO2 mmol/L 25 22 25 27   BUN mg/dL 15 13 10 7   CREATININE mg/dL 1.28 1.20 1.19 1.09   CALCIUM mg/dL 7.8* 8.0* 8.1* 9.3   PROTEIN TOTAL g/dL  --   --   --  8.0   BILIRUBIN TOTAL mg/dL  --   --   --  0.6   ALK PHOS U/L  --   --   --  74   ALT U/L  --   --   --  20   AST U/L  --   --   --  79*   GLUCOSE mg/dL 129* 105* 119* 121*             TROPHS   Date/Time Value Ref Range Status   12/24/2024 06:50 AM 23,173 0 - 20 ng/L Final   12/23/2024 07:49 AM 8,174 0 - 20 ng/L Final     HGBA1C   Date/Time Value Ref Range Status   12/24/2024 06:50 AM 5.8 See comment % Final   03/14/2023 09:46 AM 5.8 % Final     Comment:          Diagnosis of Diabetes-Adults   Non-Diabetic: < or = 5.6%   Increased risk for developing diabetes: 5.7-6.4%   Diagnostic of diabetes: > or = 6.5%  .       Monitoring of Diabetes                Age (y)     Therapeutic Goal (%)   Adults:          >18           <7.0   Pediatrics:    13-18            <7.5                   7-12           <8.0                   0- 6            7.5-8.5   American Diabetes Association. Diabetes Care 33(S1), Jan 2010.     LDLCALC   Date/Time Value Ref Range Status   12/24/2024 06:50  <=99 mg/dL Final     Comment:                                 Near   Borderline      AGE      Desirable  Optimal    High     High     Very High     0-19 Y     0 - 109     ---    110-129   >/= 130     ----    20-24 Y     0 - 119     ---    120-159   >/= 160     ----      >24 Y     0 -  99   100-129  130-159   160-189     >/=190       VLDL   Date/Time Value Ref Range Status   12/24/2024 06:50 AM 18 0 - 40 mg/dL Final   03/14/2023 09:46 AM 21 0 - 40 mg/dL Final      Last I/O:  I/O last 3 completed shifts:  In: 480 (4.8 mL/kg) [P.O.:480]  Out: 1475 (14.6 mL/kg) [Urine:1475 (0.4 mL/kg/hr)]  Weight: 100.7 kg     Past Cardiology Tests (Last 3 Years):  EKG:  Electrocardiogram 12 Lead 12/23/2024 (Preliminary)      ECG 12 Lead 12/23/2024 (Preliminary)    Echo:  Transthoracic Echo (TTE) Complete 12/26/2024  1. This is a limited study to assess for LV Thrombus after STEMI.   2. Left ventricular ejection fraction is mildly decreased, by visual estimate at 45-50%.   3. Abnormal left venticular wall motion.   4. Mid and apical anterior wall, basal and mid inferolateral wall, mid anterolateral segment, and apical lateral segment are abnormal.   5. Early LV Apical Thrombus development is appreciated.   6. Right ventricular systolic pressure is within normal limits.    Transthoracic Echo (TTE) Complete 12/23/2024   1. Left ventricular ejection fraction is mildly decreased, calculated by [employer] biplane at 44%.   2. Abnormal left venticular wall motion.   3. Entire lateral wall and mid and apical anterior wall are abnormal.   4. Spectral Doppler shows an abnormal pattern of left ventricular diastolic filling.   5. There is low normal right ventricular systolic function.   6. Right ventricular systolic pressure  is within normal limits.   7. Sludge concerning for early thrombus is noted on contrasted imaging.    Ejection Fractions:  EF   Date/Time Value Ref Range Status   12/23/2024 03:18 PM 44 %      Cath:  Cardiac Catheterization Procedure 12/23/2024    Stress Test:  No results found for this or any previous visit from the past 1095 days.    Cardiac Imaging:  No results found for this or any previous visit from the past 1095 days.      Inpatient Medications:  Scheduled medications   Medication Dose Route Frequency    aspirin  81 mg oral Daily    atorvastatin  80 mg oral Nightly    carvedilol  6.25 mg oral BID    cefTRIAXone  1 g intravenous q24h    enoxaparin  40 mg subcutaneous q24h    pantoprazole  40 mg oral Daily before breakfast    Or    esomeprazole  40 mg nasoduodenal tube Daily before breakfast    Or    pantoprazole  40 mg intravenous Daily before breakfast    losartan  12.5 mg oral Daily    nicotine  1 patch transdermal Daily    Followed by    [START ON 2/4/2025] nicotine  1 patch transdermal Daily    Followed by    [START ON 2/18/2025] nicotine  1 patch transdermal Daily    perflutren protein A microsphere  0.5 mL intravenous Once in imaging    polyethylene glycol  17 g oral Daily    spironolactone  12.5 mg oral Daily    sulfur hexafluoride microsphr  2 mL intravenous Once in imaging    tamsulosin  0.4 mg oral Daily    ticagrelor  90 mg oral BID     PRN medications   Medication    acetaminophen    Or    acetaminophen    Or    acetaminophen    magnesium hydroxide    melatonin    nitroglycerin    oxygen     Continuous Medications   Medication Dose Last Rate       Physical Exam:  Documented Vital Signs   Heart Rate:  [68-85]   Temp:  [36.6 °C (97.8 °F)-37.7 °C (99.9 °F)]   Resp:  [16-23]   BP: (121-124)/(81-87)   SpO2:  [94 %-96 %]   Temp:  [36.6 °C (97.8 °F)-37.7 °C (99.9 °F)] 37.7 °C (99.9 °F)  Heart Rate:  [68-85] 69  Resp:  [16-23] 19  BP: (121-124)/(81-87) 124/84      Oxygen Dose: *3 L/min    Documented Fluid  "Status     Intake/Output Summary (Last 24 hours) at 12/26/2024 0855  Last data filed at 12/26/2024 0700  Gross per 24 hour   Intake 720 ml   Output 1645 ml   Net -925 ml     Net IO Since Admission: -1,865 mL [12/26/24 0855]    /84 (BP Location: Left arm, Patient Position: Other (Comment)) Comment (Patient Position): Semi Gaspar's  Pulse 69   Temp 37.7 °C (99.9 °F) (Temporal)   Resp 19   Ht 1.803 m (5' 11\")   Wt 101 kg (222 lb)   SpO2 94%   BMI 30.96 kg/m²   General:  Patient is awake, alert, and oriented.  Patient is in no acute distress.  HEENT:  Pupils equal and reactive.  Normocephalic.  Moist mucosa.    Neck:  No thyromegaly.  Normal Jugular Venous Pressure.  Cardiovascular:  Regular rate and rhythm.  Normal S1 and S2.  1/6 DONNY.  Pulmonary:  Clear to auscultation bilaterally.  Abdomen:  Soft. Non-tender.   Non-distended.  Positive bowel sounds.  Lower Extremities:  2+ pedal pulses. No LE edema.  Neurologic:  Cranial nerves intact.  No focal deficit.   Skin: Skin warm and dry, normal skin turgor.   Psychiatric: Normal affect.             Assessment/Plan   Peña Arenas is a 69 y.o. male with past medical history significant for Hypertension, Dyslipidemia, BPH. Presented with chest pain. Cardiology is consulted for STEMI s/p PCI to OM.       #Inferior and lateral STEMI  -Patient presented with chest discomfort as yesterday.  ECG on presentation showed normal sinus rhythm with ST elevation in inferior and lateral leads.  He was taken for urgent coronary angiogram which revealed 99% thrombotic occlusion of the branch of OM 2 otherwise mild to moderate CAD elsewhere with subsequent PCI 2.5 22 mm GEM.  - Given Thrombus formation on Echocardiogram, adjust medicaitons as follows   ++ STOP TICAGRELOR AT THIS TIME   ++ LOAD Clopidogrel 600 mg X 1   ++ ADD APIXABAN 5 mg BID DOSING   ++ CONTINUE ASPIRIN Through 12/29/2024  --12/30 his ACS Regimen will be Clopidogrel 75 mg Daily  + Apixaban 5 mg BID due to LV " Thrombus in setting of STEMI   - Cardiac MRI in te O/P setting has been ordered       #HTN  Elevated  Has been off his medications for at least 8 months   Agree with Coreg 6.25 mg BID     #DLD  -Last  on 3/14/2023  -Start atorvastatin 80 mg daily  -Repeat lipid panel            The patient will benefit from follow-up in Cardiology clinic within 3-5 weeks of discharge.  The patient or their family should call the [facility] at [phone] for [facility].  If the care team is assisting in scheduling a follow up appointment prior to their discharge, please ensure that the patient has committed to attending the scheduled date and time.     Thank you for allowing me to participate in their care.  Please feel free to call me with any further questions or concerns.    Can be discharged once Clopidogrel Load has been completed and he has received his 1st dose of Apixaban      Peripheral IV 12/23/24 18 G Distal;Left;Upper Arm (Active)   Site Assessment Clean;Dry;Intact 12/25/24 0900   Dressing Status Clean;Dry 12/25/24 0900   Number of days: 2       Urethral Catheter 20 Fr. (Active)   Site Assessment Clean;Skin intact 12/25/24 1244   Number of days: 2       Code Status:  Full Code      Baron Schafer DO   Division of Cardiovascular Medicine  [facility] [place]

## 2024-12-26 NOTE — RESEARCH NOTES
Artificial Intelligence Monitoring in Nursing (AIMS Nursing) Study    Principle Investigator - Dr. Joey Ledbetter  Research Coordinator - Imer Rivas RN     Patient Name - Peña Arenas  Date - 12/26/2024 9:04 AM  Location - 4th Floor ICU [facility]    Peña Arenas was approached by Imer Rivas RN to talk about participating in the AIMS Nursing Study. The patient declined to participate in the study. Study protocol was followed and patient was given study contact information.     Imer Rivas RN

## 2024-12-26 NOTE — PROGRESS NOTES
"Peña Arenas is a 69 y.o. male on day 3 of admission presenting with ST elevation myocardial infarction (STEMI), unspecified artery (Multi).    Subjective    Seen and examined, discussed with the patient and the staff and the wife at the bedside, he is being transferred to [facility],.  Is a 69-year-old male, who is a smoker, and also known to history of hypertension, hyperlipidemia, BPH, came to the emergency department, with chest pain, severe chest pain, which was consistent after the 8 PM, and the workup with ST elevation in MI, had a cath done and stent was placed in the proximal OM 2  He has a Wagner, urology is following,       Objective     Physical Exam  Alert oriented ox3.  HEENT unremarkable.  Neck no JVD.  Heart S1-S2.  Lungs reduced air entry.  Abdomen soft nontender.  Legs no edema  Last Recorded Vitals  Blood pressure 124/80, pulse 69, temperature 37.7 °C (99.9 °F), temperature source Temporal, resp. rate 19, height 1.803 m (5' 11\"), weight 101 kg (222 lb), SpO2 95%.  Intake/Output last 3 Shifts:  I/O last 3 completed shifts:  In: 480 (4.8 mL/kg) [P.O.:480]  Out: 1475 (14.6 mL/kg) [Urine:1475 (0.4 mL/kg/hr)]  Weight: 100.7 kg     Relevant Results  Results for orders placed or performed during the hospital encounter of 12/23/24 (from the past 96 hours)   CBC and Auto Differential   Result Value Ref Range    WBC 13.2 (H) 4.4 - 11.3 x10*3/uL    nRBC 0.0 0.0 - 0.0 /100 WBCs    RBC 5.16 4.50 - 5.90 x10*6/uL    Hemoglobin 15.7 13.5 - 17.5 g/dL    Hematocrit 47.8 41.0 - 52.0 %    MCV 93 80 - 100 fL    MCH 30.4 26.0 - 34.0 pg    MCHC 32.8 32.0 - 36.0 g/dL    RDW 14.4 11.5 - 14.5 %    Platelets 277 150 - 450 x10*3/uL    Neutrophils % 67.3 40.0 - 80.0 %    Immature Granulocytes %, Automated 0.4 0.0 - 0.9 %    Lymphocytes % 24.1 13.0 - 44.0 %    Monocytes % 7.6 2.0 - 10.0 %    Eosinophils % 0.2 0.0 - 6.0 %    Basophils % 0.4 0.0 - 2.0 %    Neutrophils Absolute 8.92 (H) 1.20 - 7.70 x10*3/uL    Immature " Granulocytes Absolute, Automated 0.05 0.00 - 0.70 x10*3/uL    Lymphocytes Absolute 3.19 1.20 - 4.80 x10*3/uL    Monocytes Absolute 1.00 0.10 - 1.00 x10*3/uL    Eosinophils Absolute 0.03 0.00 - 0.70 x10*3/uL    Basophils Absolute 0.05 0.00 - 0.10 x10*3/uL   Comprehensive Metabolic Panel   Result Value Ref Range    Glucose 121 (H) 74 - 99 mg/dL    Sodium 137 136 - 145 mmol/L    Potassium 3.8 3.5 - 5.3 mmol/L    Chloride 101 98 - 107 mmol/L    Bicarbonate 27 21 - 32 mmol/L    Anion Gap 13 10 - 20 mmol/L    Urea Nitrogen 7 6 - 23 mg/dL    Creatinine 1.09 0.50 - 1.30 mg/dL    eGFR 74 >60 mL/min/1.73m*2    Calcium 9.3 8.6 - 10.3 mg/dL    Albumin 3.9 3.4 - 5.0 g/dL    Alkaline Phosphatase 74 33 - 136 U/L    Total Protein 8.0 6.4 - 8.2 g/dL    AST 79 (H) 9 - 39 U/L    Bilirubin, Total 0.6 0.0 - 1.2 mg/dL    ALT 20 10 - 52 U/L   Troponin I, High Sensitivity   Result Value Ref Range    Troponin I, High Sensitivity 8,174 (HH) 0 - 20 ng/L   Protime-INR   Result Value Ref Range    Protime 11.5 9.8 - 12.8 seconds    INR 1.0 0.9 - 1.1   aPTT   Result Value Ref Range    aPTT 31 27 - 38 seconds   ACTIVATED CLOTTING TIME LOW   Result Value Ref Range    POCT Activated Clotting Time Low Range 332 (H) 83 - 199 sec   ACTIVATED CLOTTING TIME LOW   Result Value Ref Range    POCT Activated Clotting Time Low Range 337 (H) 83 - 199 sec   ECG 12 Lead   Result Value Ref Range    Ventricular Rate 88 BPM    Atrial Rate 88 BPM    VA Interval 166 ms    QRS Duration 122 ms    QT Interval 408 ms    QTC Calculation(Bazett) 493 ms    P Axis 44 degrees    R Axis 67 degrees    T Axis 60 degrees    QRS Count 14 beats    Q Onset 212 ms    P Onset 129 ms    P Offset 185 ms    T Offset 416 ms    QTC Fredericia 463 ms   Electrocardiogram 12 Lead   Result Value Ref Range    Ventricular Rate 77 BPM    Atrial Rate 77 BPM    VA Interval 176 ms    QRS Duration 128 ms    QT Interval 434 ms    QTC Calculation(Bazett) 491 ms    P Axis 44 degrees    R Axis 50 degrees     T Axis 54 degrees    QRS Count 13 beats    Q Onset 210 ms    P Onset 122 ms    P Offset 180 ms    T Offset 427 ms    QTC Fredericia 471 ms   Transthoracic Echo (TTE) Complete   Result Value Ref Range    AV pk suhail 1.13 m/s    AV mn grad 3 mmHg    LVOT diam 2.24 cm    MV E/A ratio 0.35     LA vol index A/L 27.0 ml/m2    Tricuspid annular plane systolic excursion 1.0 cm    LV EF 44 %    RV free wall pk S' 8.00 cm/s    LVIDd 4.34 cm    RVSP 17.5 mmHg    Aortic Valve Area by Continuity of VTI 3.24 cm2    Aortic Valve Area by Continuity of Peak Velocity 3.37 cm2    AV pk grad 5 mmHg    LV A4C EF 40.9    Hemoglobin A1C   Result Value Ref Range    Hemoglobin A1C 5.8 (H) See comment %    Estimated Average Glucose 120 Not Established mg/dL   Basic Metabolic Panel   Result Value Ref Range    Glucose 119 (H) 74 - 99 mg/dL    Sodium 134 (L) 136 - 145 mmol/L    Potassium 3.7 3.5 - 5.3 mmol/L    Chloride 104 98 - 107 mmol/L    Bicarbonate 25 21 - 32 mmol/L    Anion Gap 9 (L) 10 - 20 mmol/L    Urea Nitrogen 10 6 - 23 mg/dL    Creatinine 1.19 0.50 - 1.30 mg/dL    eGFR 67 >60 mL/min/1.73m*2    Calcium 8.1 (L) 8.6 - 10.3 mg/dL   Lipid Panel   Result Value Ref Range    Cholesterol 177 0 - 199 mg/dL    HDL-Cholesterol 39.4 mg/dL    Cholesterol/HDL Ratio 4.5     LDL Calculated 119 (H) <=99 mg/dL    VLDL 18 0 - 40 mg/dL    Triglycerides 91 0 - 149 mg/dL    Non HDL Cholesterol 138 0 - 149 mg/dL   CBC   Result Value Ref Range    WBC 16.4 (H) 4.4 - 11.3 x10*3/uL    nRBC 0.0 0.0 - 0.0 /100 WBCs    RBC 4.64 4.50 - 5.90 x10*6/uL    Hemoglobin 14.1 13.5 - 17.5 g/dL    Hematocrit 40.7 (L) 41.0 - 52.0 %    MCV 88 80 - 100 fL    MCH 30.4 26.0 - 34.0 pg    MCHC 34.6 32.0 - 36.0 g/dL    RDW 14.2 11.5 - 14.5 %    Platelets 218 150 - 450 x10*3/uL   Magnesium   Result Value Ref Range    Magnesium 1.83 1.60 - 2.40 mg/dL   Troponin I, High Sensitivity   Result Value Ref Range    Troponin I, High Sensitivity 23,173 (HH) 0 - 20 ng/L   Renal Function  Panel   Result Value Ref Range    Glucose 105 (H) 74 - 99 mg/dL    Sodium 135 (L) 136 - 145 mmol/L    Potassium 3.6 3.5 - 5.3 mmol/L    Chloride 105 98 - 107 mmol/L    Bicarbonate 22 21 - 32 mmol/L    Anion Gap 12 10 - 20 mmol/L    Urea Nitrogen 13 6 - 23 mg/dL    Creatinine 1.20 0.50 - 1.30 mg/dL    eGFR 66 >60 mL/min/1.73m*2    Calcium 8.0 (L) 8.6 - 10.3 mg/dL    Phosphorus 2.5 2.5 - 4.9 mg/dL    Albumin 3.0 (L) 3.4 - 5.0 g/dL   CBC   Result Value Ref Range    WBC 17.8 (H) 4.4 - 11.3 x10*3/uL    nRBC 0.0 0.0 - 0.0 /100 WBCs    RBC 4.40 (L) 4.50 - 5.90 x10*6/uL    Hemoglobin 13.5 13.5 - 17.5 g/dL    Hematocrit 39.0 (L) 41.0 - 52.0 %    MCV 89 80 - 100 fL    MCH 30.7 26.0 - 34.0 pg    MCHC 34.6 32.0 - 36.0 g/dL    RDW 14.5 11.5 - 14.5 %    Platelets 210 150 - 450 x10*3/uL   Magnesium   Result Value Ref Range    Magnesium 1.77 1.60 - 2.40 mg/dL   Blood Culture    Specimen: Peripheral Venipuncture; Blood culture   Result Value Ref Range    Blood Culture Loaded on Instrument - Culture in progress    Urinalysis with Reflex Culture and Microscopic   Result Value Ref Range    Color, Urine Dark-Brown (N) Light-Yellow, Yellow, Dark-Yellow    Appearance, Urine Ex.Turbid (N) Clear    Specific Gravity, Urine 1.027 1.005 - 1.035    pH, Urine 5.5 5.0, 5.5, 6.0, 6.5, 7.0, 7.5, 8.0    Protein, Urine 100 (2+) (A) NEGATIVE, 10 (TRACE), 20 (TRACE) mg/dL    Glucose, Urine Normal Normal mg/dL    Blood, Urine OVER (3+) (A) NEGATIVE    Ketones, Urine TRACE (A) NEGATIVE mg/dL    Bilirubin, Urine NEGATIVE NEGATIVE    Urobilinogen, Urine 2 (1+) (A) Normal mg/dL    Nitrite, Urine NEGATIVE NEGATIVE    Leukocyte Esterase, Urine 500 Milind/µL (A) NEGATIVE   Microscopic Only, Urine   Result Value Ref Range    WBC, Urine >50 (A) 1-5, NONE /HPF    RBC, Urine >20 (A) NONE, 1-2, 3-5 /HPF    Mucus, Urine 4+ Reference range not established. /LPF   CBC   Result Value Ref Range    WBC 14.1 (H) 4.4 - 11.3 x10*3/uL    nRBC 0.0 0.0 - 0.0 /100 WBCs    RBC 4.08  (L) 4.50 - 5.90 x10*6/uL    Hemoglobin 12.7 (L) 13.5 - 17.5 g/dL    Hematocrit 36.7 (L) 41.0 - 52.0 %    MCV 90 80 - 100 fL    MCH 31.1 26.0 - 34.0 pg    MCHC 34.6 32.0 - 36.0 g/dL    RDW 14.6 (H) 11.5 - 14.5 %    Platelets 209 150 - 450 x10*3/uL   Basic Metabolic Panel   Result Value Ref Range    Glucose 129 (H) 74 - 99 mg/dL    Sodium 135 (L) 136 - 145 mmol/L    Potassium 3.7 3.5 - 5.3 mmol/L    Chloride 103 98 - 107 mmol/L    Bicarbonate 25 21 - 32 mmol/L    Anion Gap 11 10 - 20 mmol/L    Urea Nitrogen 15 6 - 23 mg/dL    Creatinine 1.28 0.50 - 1.30 mg/dL    eGFR 61 >60 mL/min/1.73m*2    Calcium 7.8 (L) 8.6 - 10.3 mg/dL   Transthoracic Echo (TTE) Limited   Result Value Ref Range    LV EF 48 %    LVIDd 4.18 cm    RVSP 26.8 mmHg    LV A4C EF 43.8         Medications:  aspirin, 81 mg, oral, Daily  atorvastatin, 80 mg, oral, Nightly  carvedilol, 6.25 mg, oral, BID  cefTRIAXone, 1 g, intravenous, q24h  clopidogrel, 600 mg, oral, Once   And  [START ON 12/27/2024] clopidogrel, 75 mg, oral, Daily  enoxaparin, 40 mg, subcutaneous, q24h  pantoprazole, 40 mg, oral, Daily before breakfast   Or  esomeprazole, 40 mg, nasoduodenal tube, Daily before breakfast   Or  pantoprazole, 40 mg, intravenous, Daily before breakfast  lactulose, 20 g, oral, BID  losartan, 12.5 mg, oral, Daily  nicotine, 1 patch, transdermal, Daily   Followed by  [START ON 2/4/2025] nicotine, 1 patch, transdermal, Daily   Followed by  [START ON 2/18/2025] nicotine, 1 patch, transdermal, Daily  perflutren protein A microsphere, 0.5 mL, intravenous, Once in imaging  polyethylene glycol, 17 g, oral, Daily  spironolactone, 12.5 mg, oral, Daily  sulfur hexafluoride microsphr, 2 mL, intravenous, Once in imaging  tamsulosin, 0.4 mg, oral, Daily      PRN medications: acetaminophen **OR** acetaminophen **OR** acetaminophen, magnesium hydroxide, melatonin, nitroglycerin, oxygen    Transthoracic Echo (TTE) Complete    Result Date: 12/23/2024   [facility] [place], [address] [address]              Tel [phone] and Fax [phone] TRANSTHORACIC ECHOCARDIOGRAM REPORT  Patient Name:       BRADEN BERNAL    Reading Physician:    [id number] Schafer DO Study Date:         12/23/2024          Ordering Provider:    [id number] JONO PLUMMER MRN/PID:            [id number]            Fellow: Accession#:         [id number]        Nurse: Date of Birth/Age:  1955 / 69      Sonographer:          Kaylan Shahid RDCS                     years Gender assigned at                     Additional Staff: Birth: Height:             180.34 cm           Admit Date:           12/23/2024 Weight:             100.70 kg           Admission Status:     Inpatient -                                                               Routine BSA / BMI:          2.20 m2 / 30.96     Encounter#:           [id number]                     kg/m2 Blood Pressure:     182/100 mmHg        Department Location:  [facility] Study Type:    TRANSTHORACIC ECHO (TTE) COMPLETE Diagnosis/ICD: ST elevation (STEMI) myocardial infarction of unspecified                site-I21.3 Indication:    STEMI CPT Code:      Echo Complete w Full Doppler-90070 Patient History: MI Location/Type:  ST Elevation MI PCI and Stent:     Stent deployed in the OM on 12/23/2024. Pertinent History: HTN and Hyperlipidemia. Study Detail: The following Echo studies were performed: 2D, Doppler, M-Mode and               color flow. Definity used as a contrast agent for endocardial               border definition. Total contrast used for this procedure was 3 mL               via IV push.  PHYSICIAN INTERPRETATION: Left Ventricle: Left ventricular ejection fraction is mildly decreased, calculated by Mcdonald's biplane at 44%. Left venticular wall motion is abnormal. The left ventricular cavity size is normal. There is  mildly increased septal and mildly increased posterior left ventricular wall thickness. There is left ventricular concentric remodeling. Spectral Doppler shows an abnormal pattern of left ventricular diastolic filling. Sludge concerning for early thrombus is noted on contrasted imaging. LV Wall Scoring: The entire lateral wall and mid and apical anterior wall are hypokinetic. Left Atrium: The left atrium is normal in size. Right Ventricle: The right ventricle is normal in size. There is low normal right ventricular systolic function. TAPSe = 10 mm. Right Atrium: The right atrium is normal in size. Aortic Valve: The aortic valve is trileaflet. The aortic valve dimensionless index is 0.82. There is no evidence of aortic valve regurgitation. The peak instantaneous gradient of the aortic valve is 5 mmHg. The mean gradient of the aortic valve is 3 mmHg. Mitral Valve: The mitral valve is mildly thickened. There is trace mitral valve regurgitation. Tricuspid Valve: The tricuspid valve is structurally normal. There is trace tricuspid regurgitation. The Doppler estimated RVSP is within normal limits at 17.5 mmHg. Pulmonic Valve: The pulmonic valve is structurally normal. There is physiologic pulmonic valve regurgitation. Pericardium: There is no pericardial effusion noted. Aorta: The aortic root is abnormal. There is mild dilatation of the ascending aorta. There is mild dilatation of the aortic root. In comparison to the previous echocardiogram(s): There are no prior studies on this patient for comparison purposes.  CONCLUSIONS:  1. Left ventricular ejection fraction is mildly decreased, calculated by Mcdonald's biplane at 44%.  2. Abnormal left venticular wall motion.  3. Entire lateral wall and mid and apical anterior wall are abnormal.  4. Spectral Doppler shows an abnormal pattern of left ventricular diastolic filling.  5. There is low normal right ventricular systolic function.  6. Right ventricular systolic pressure is  within normal limits.  7. Sludge concerning for early thrombus is noted on contrasted imaging. QUANTITATIVE DATA SUMMARY:  2D MEASUREMENTS:          Normal Ranges: LAs:             3.19 cm  (2.7-4.0cm) IVSd:            1.20 cm  (0.6-1.1cm) LVPWd:           1.11 cm  (0.6-1.1cm) LVIDd:           4.34 cm  (3.9-5.9cm) LVIDs:           2.99 cm LV Mass Index:   80 g/m2 LVEDV Index:     48 ml/m2 LV % FS          30.9 %  LA VOLUME:                    Normal Ranges: LA Vol A4C:        56.6 ml    (22+/-6mL/m2) LA Vol A2C:        57.6 ml LA Vol BP:         59.5 ml LA Vol Index A4C:  25.7ml/m2 LA Vol Index A2C:  26.1 ml/m2 LA Vol Index BP:   27.0 ml/m2 LA Area A4C:       17.5 cm2 LA Area A2C:       18.4 cm2 LA Major Axis A4C: 4.6 cm LA Major Axis A2C: 5.0 cm LA Volume Index:   27.1 ml/m2 LA Vol A4C:        54.4 ml LA Vol A2C:        51.2 ml LA Vol Index BSA:  23.9 ml/m2  AORTA MEASUREMENTS:         Normal Ranges: Ao Sinus, d:        4.20 cm (2.1-3.5cm) Ao STJ, d:          3.30 cm (1.7-3.4cm) Asc Ao, d:          3.80 cm (2.1-3.4cm)  LV SYSTOLIC FUNCTION BY 2D PLANIMETRY (MOD):                      Normal Ranges: EF-A4C View:    41 % (>=55%) EF-A2C View:    49 % EF-Biplane:     44 % LV EF Reported: 44 %  LV DIASTOLIC FUNCTION:             Normal Ranges: MV Peak E:             0.32 m/s    (0.7-1.2 m/s) MV Peak A:             0.91 m/s    (0.42-0.7 m/s) E/A Ratio:             0.35        (1.0-2.2) MV e'                  0.055 m/s   (>8.0) MV lateral e'          0.05 m/s MV medial e'           0.06 m/s MV A Dur:              114.18 msec E/e' Ratio:            5.80        (<8.0) a'                     0.16 m/s PulmV Sys Aj:         38.53 cm/s PulmV Hernandez Aj:        24.67 cm/s PulmV S/D Aj:         1.51 PulmV A Revs Aj:      19.88 cm/s PulmV A Revs Dur:      95.15 msec  MITRAL VALVE:          Normal Ranges: MV DT:        234 msec (150-240msec)  AORTIC VALVE:                     Normal Ranges: AoV Vmax:                1.13 m/s  (<=1.7m/s) AoV Peak P.1 mmHg (<20mmHg) AoV Mean PG:             3.0 mmHg (1.7-11.5mmHg) LVOT Max Aj:            0.97 m/s (<=1.1m/s) AoV VTI:                 19.71 cm (18-25cm) LVOT VTI:                16.19 cm LVOT Diameter:           2.24 cm  (1.8-2.4cm) AoV Area, VTI:           3.24 cm2 (2.5-5.5cm2) AoV Area,Vmax:           3.37 cm2 (2.5-4.5cm2) AoV Dimensionless Index: 0.82  RIGHT VENTRICLE: RV Basal 3.50 cm RV Mid   2.70 cm RV Major 7.7 cm TAPSE:   10.0 mm RV s'    0.08 m/s  TRICUSPID VALVE/RVSP:          Normal Ranges: Peak TR Velocity:     1.90 m/s Est. RA Pressure:     3 mmHg RV Syst Pressure:     17 mmHg  (< 30mmHg) IVC Diam:             1.29 cm  PULMONIC VALVE:          Normal Ranges: RVOT Vmax:      0.60 m/s (0.6-0.9m/s) PV Max Aj:     0.9 m/s  (0.6-0.9m/s) PV Max PG:      3.5 mmHg  Pulmonary Veins: PulmV A Revs Dur: 95.15 msec PulmV A Revs Aj: 19.88 cm/s PulmV Hernandez Aj:   24.67 cm/s PulmV S/D Aj:    1.51 PulmV Sys Aj:    38.53 cm/s  AORTA: Asc Ao Diam 3.84 cm  [id number] Baron Schafer DO Electronically signed on 2024 at 6:54:03 PM  Wall Scoring  ** Final **     Cardiac Catheterization Procedure    Result Date: 2024   [facility], Cath Lab, [address] [place] [address] Cardiovascular Catheterization Report Patient Name:     BRADEN BERNAL    Performing Physician:  Tony Rodriguez MD Study Date:       2024          Verifying Physician:   Tony Rodriguez MD MRN/PID:          [id number]            Cardiologist/Co-Scrub: Accession#:       [id number]        Ordering Provider:     [id number] SHERRY RODRIGUEZ Date of           1955 / 69      Cardiologist: Birth/Age:        years Gender:           M                   Fellow: Encounter#:       [id number]          Surgeon:  Study: Left Heart Cath with PCI  Indications: BRADEN BRENAL  is a 70 year old male who presents with hypertension and a chest pain assessment of typical angina. Acute coronary syndrome - STEMI. Medical History: Stress test performed: No. CTA performed: NoJunior Galvan accessed: No. LVEF Assessed: No. Cardiac arrest: No. Cardiac surgical consult: No. Cardiovascular instability: No. Frailty status of patient entering lab: 6 = Moderately frail.  Procedure Description: After infiltration with 2% Lidocaine, the right radial artery was cannulated with a modified Seldinger technique. Subsequently a 6 Vincentian sheath was placed in the right radial artery. Selective coronary catheterization was performed using a 5 Fr catheter(s) exchanged over a guide wire to cannulate the coronary arteries. A JL 3.5 tip catheter was used for left coronary injections. A JR 4 tip catheter was used for right coronary injections. Multiple injections of contrast were made into the left and right coronary arteries with angiograms recorded in multiple projections. After completion of the procedure, the arterial sheath was pulled and a TR Band Radial Compression Device was utilized to obtain patent hemostasis.  Coronary Angiography: The coronary circulation is co-dominant.  Left Main Coronary Artery: The left main coronary artery is a normal caliber vessel. The left main arises normally from the left coronary sinus of Valsalva and bifurcates into the LAD and circumflex coronary arteries. The left main coronary artery showed no significant disease or stenosis greater than 30%.  Left Anterior Descending Coronary Artery Distribution: The left anterior descending coronary artery is a normal caliber vessel. The LAD arises normally from the left main coronary artery. The mid left anterior descending coronary artery showed 40% stenosis. An additional lesion, located at the distal left anterior descending coronary artery, revealed 50% stenosis. The 1st diagonal branch showed no significant disease or stenosis greater  than 30%. The 2nd diagonal branch is a small caliber vessel. The ostial 2nd diagonal branch showed 50% stenosis. The 3rd diagonal branch revealed no significant disease or stenosis greater than 30%.  Circumflex Coronary Artery Distribution: The circumflex coronary artery is a normal caliber vessel. The circumflex arises normally from the left main coronary artery and terminates in the AV groove. The circumflex revealed no significant disease or stenosis greater than 30%. The 1st obtuse marginal branch is a small caliber vessel. The 1st obtuse marginal branch showed no significant disease or stenosis greater than 30%. The 2nd obtuse marginal branch is a large caliber vessel. The proximal 2nd obtuse marginal branch demonstrated 100% occlusion of a banch of OM2. Culprit is a branch of OM2 which supplies the lateral wall and part of the inferior wall. The left posterior descending artery is a small caliber vessel. The left posterior descending artery showed no significant disease or stenosis greater than 30%.  Right Coronary Artery Distribution: The right coronary artery is a normal caliber vessel. The RCA arises normally from the right sinus of Valsalva. The proximal and mid to distal right coronary artery showed 50% stenosis. The acute marginal branch showed no significant disease or stenosis greater than 30%. The right posterolateral branch showed no significant disease or stenosis greater than 30%. The right posterior descending artery showed no significant disease or stenosis greater than 30%.  Coronary Interventions: Angiography reveals a 100% stenosis of the proximal branch of OM2 coronary artery. Pre-intervention ANDREY flow was 1. Percutaneous coronary intervention was performed within the proximal branch of OM2. The vessel was pre-dilated using a compliant balloon 2.0 mm x 12 mm Guillermo GEM 2.5 mm x 22 mm was advanced to the lesion and implanted. The stent was post dilated using a non-compliant balloon 2.5 mm x 12  mm at 24 KIYA. The stenosis was successfully reduced from 100% to 0%. Post-intervention ANDREY flow was 3. We used 6 Faroese EBU 3.5 guide catheter for left engagement. We wired the LCx using a Runthrough wire. We predilated the OM 2 branch using 2.0 compliant balloon, this was followed by the deployment of 2.5 x 22 mm GEM, which was postdilated using 2.5 NC balloon at high pressures. We performed post inflation for prolonged time of 2 minutes to overcome the underexpansion in the proximal segment of the stent. Final angiogram with excellent results and no complications. Patient is chest pain free at the end of the procedure.  Coronary Lesion Summary: Vessel                   Stenosis                   Vessel Segment LAD                    40% stenosis                      mid LAD                    50% stenosis                     distal 2nd Diagonal           50% stenosis                     ostial OM 2         100% occlusion of a banch of OM2          proximal RCA                    50% stenosis           proximal and mid to distal  Hemo Personnel: +----------------+---------+ Name            Duty      +----------------+---------+ Deion Rodriguez MDPALEX MD 1 +----------------+---------+  Hemodynamic Pressures:  +----+---------------------+----------+-------------+--------------+---------+ Site      Date Time      Phase NameSystolic mmHgDiastolic mmHgMean mmHg +----+---------------------+----------+-------------+--------------+---------+   AO12/23/2024 8:01:44 AM  AIR REST          176           110      138 +----+---------------------+----------+-------------+--------------+---------+   AO12/23/2024 8:02:24 AM  AIR REST          157            98      120 +----+---------------------+----------+-------------+--------------+---------+   AO12/23/2024 8:02:31 AM  AIR REST            0            84      114 +----+---------------------+----------+-------------+--------------+---------+    AO12/23/2024 8:16:11 AM  AIR REST          166            83      122 +----+---------------------+----------+-------------+--------------+---------+   AO12/23/2024 8:34:10 AM  AIR REST          181           108      136 +----+---------------------+----------+-------------+--------------+---------+  Complications: No in-lab complications observed.  Cardiac Cath Post Procedure Notes: Post Procedure Diagnosis: See below. Blood Loss:               Estimated blood loss during the procedure was 5 mls. Specimens Removed:        Number of specimen(s) removed: none. ____________________________________________________________________________________ CONCLUSIONS:  1. 99% thrombotic occlusion of a branch of OM 2, otherwise mild to moderate CAD elsewhere in a co_dominant system. This OM 2 branch supplies part of the lateral and inferior walls which explains his massive inferior and lateral ST elevation on EKG at presentation.  2. Status post successful PCI of OM2 branch using 2.5 x 22 mm GEM [postdilated using 2.5 NC balloon at high pressure and for 2 minutes].  3. Aspirin 81 mg once daily for life and ticagrelor 90 mg twice daily for at least 12 months. ICD 10 Codes: ST elevation (STEMI) myocardial infarction involving left anterior descending coronary artery-I21.02  CPT Codes: Revasc during AMI stent/angio/atherc,ins asp Thrombectomy, Left Anterior Descending single Vessel (PCI)-78558.LD; Left Heart Cath (visualization of coronaries) and LV-50606  [id number] Deion Rodriguez MD Performing Physician Electronically signed by [id number] Deion Rodriguez MD on 12/23/2024 at 10:50:09 AM  ** Final (Updated) **     ECG 12 Lead    Result Date: 12/23/2024  Sinus rhythm with Premature supraventricular complexes Right bundle branch block Lateral infarct , new Inferior infarct , age undetermined ** ** ACUTE MI / STEMI ** ** Abnormal ECG When compared with ECG of 04-JAN-2012 11:48, Premature supraventricular complexes are now Present Right  bundle branch block is now Present Acute Lateral infarct is now Present Inferior infarct is now Present    Electrocardiogram 12 Lead    Result Date: 12/23/2024  Sinus rhythm with Premature atrial complexes with Aberrant conduction Right bundle branch block Inferior infarct (cited on or before 23-DEC-2024) Anterolateral infarct (cited on or before 23-DEC-2024) Abnormal ECG When compared with ECG of 23-DEC-2024 09:35, No significant change was found      Assessment/Plan   Assessment & Plan  ST elevation myocardial infarction (STEMI), unspecified artery (Multi)    69-year-old -American male, who is known to history of hypertension, hyperlipidemia, smoker, occasional EtOH use, BPH, comes to the emergency department with severe chest pain, diagnosed with NSTEMI, he was taken to Cath Lab, had a stent done, to the  2, he is being transferred here for further management.  He was at ICU and transferred here now.  NSTEMI, stable now, continuing the aspirin Brilinta and statin.  Hematuria, after traumatic catheterization, monitoring, urology on board,.  Leukocytosis, ordered the cultures, chest x-ray, we will monitor.  Hypertension same  Smoking advised to quit smoking.  DVT prophylaxis       I spent 25 minutes in the professional and overall care of this patient.    Hernán Thacker MD

## 2024-12-26 NOTE — PROGRESS NOTES
12/26/24 1148   Discharge Planning   Expected Discharge Disposition Home          Patient is S/p cardiac cath with stent but had some urinary retention with hematuria. He still has a Wagner. Patient will be downgraded from ICU to med-surg status and will be transferred to regular floor. When patient is medically ready will go home and no needs identified.

## 2024-12-27 ENCOUNTER — PHARMACY VISIT (OUTPATIENT)
Dept: PHARMACY | Facility: CLINIC | Age: 69
End: 2024-12-27
Payer: MEDICARE

## 2024-12-27 ENCOUNTER — APPOINTMENT (OUTPATIENT)
Dept: CARDIOLOGY | Facility: HOSPITAL | Age: 69
End: 2024-12-27
Payer: COMMERCIAL

## 2024-12-27 VITALS
DIASTOLIC BLOOD PRESSURE: 78 MMHG | HEART RATE: 62 BPM | WEIGHT: 222 LBS | HEIGHT: 71 IN | TEMPERATURE: 97.7 F | OXYGEN SATURATION: 97 % | RESPIRATION RATE: 18 BRPM | SYSTOLIC BLOOD PRESSURE: 126 MMHG | BODY MASS INDEX: 31.08 KG/M2

## 2024-12-27 LAB
ANION GAP SERPL CALC-SCNC: 12 MMOL/L (ref 10–20)
BACTERIA UR CULT: NO GROWTH
BUN SERPL-MCNC: 13 MG/DL (ref 6–23)
CALCIUM SERPL-MCNC: 8.4 MG/DL (ref 8.6–10.3)
CHLORIDE SERPL-SCNC: 105 MMOL/L (ref 98–107)
CO2 SERPL-SCNC: 23 MMOL/L (ref 21–32)
CREAT SERPL-MCNC: 1.19 MG/DL (ref 0.5–1.3)
EGFRCR SERPLBLD CKD-EPI 2021: 67 ML/MIN/1.73M*2
ERYTHROCYTE [DISTWIDTH] IN BLOOD BY AUTOMATED COUNT: 14.6 % (ref 11.5–14.5)
GLUCOSE SERPL-MCNC: 101 MG/DL (ref 74–99)
HCT VFR BLD AUTO: 38.4 % (ref 41–52)
HGB BLD-MCNC: 12.9 G/DL (ref 13.5–17.5)
MCH RBC QN AUTO: 30.1 PG (ref 26–34)
MCHC RBC AUTO-ENTMCNC: 33.6 G/DL (ref 32–36)
MCV RBC AUTO: 90 FL (ref 80–100)
NRBC BLD-RTO: 0 /100 WBCS (ref 0–0)
PLATELET # BLD AUTO: 249 X10*3/UL (ref 150–450)
POTASSIUM SERPL-SCNC: 4 MMOL/L (ref 3.5–5.3)
RBC # BLD AUTO: 4.28 X10*6/UL (ref 4.5–5.9)
SODIUM SERPL-SCNC: 136 MMOL/L (ref 136–145)
WBC # BLD AUTO: 10 X10*3/UL (ref 4.4–11.3)

## 2024-12-27 PROCEDURE — 2500000002 HC RX 250 W HCPCS SELF ADMINISTERED DRUGS (ALT 637 FOR MEDICARE OP, ALT 636 FOR OP/ED): Performed by: NURSE PRACTITIONER

## 2024-12-27 PROCEDURE — 2500000001 HC RX 250 WO HCPCS SELF ADMINISTERED DRUGS (ALT 637 FOR MEDICARE OP): Performed by: INTERNAL MEDICINE

## 2024-12-27 PROCEDURE — 36415 COLL VENOUS BLD VENIPUNCTURE: CPT | Performed by: INTERNAL MEDICINE

## 2024-12-27 PROCEDURE — 2500000001 HC RX 250 WO HCPCS SELF ADMINISTERED DRUGS (ALT 637 FOR MEDICARE OP): Performed by: NURSE PRACTITIONER

## 2024-12-27 PROCEDURE — RXMED WILLOW AMBULATORY MEDICATION CHARGE

## 2024-12-27 PROCEDURE — 93005 ELECTROCARDIOGRAM TRACING: CPT

## 2024-12-27 PROCEDURE — 85027 COMPLETE CBC AUTOMATED: CPT | Performed by: INTERNAL MEDICINE

## 2024-12-27 PROCEDURE — 2500000002 HC RX 250 W HCPCS SELF ADMINISTERED DRUGS (ALT 637 FOR MEDICARE OP, ALT 636 FOR OP/ED): Performed by: INTERNAL MEDICINE

## 2024-12-27 PROCEDURE — 80048 BASIC METABOLIC PNL TOTAL CA: CPT | Performed by: INTERNAL MEDICINE

## 2024-12-27 RX ORDER — TAMSULOSIN HYDROCHLORIDE 0.4 MG/1
0.4 CAPSULE ORAL DAILY
Qty: 30 CAPSULE | Refills: 0 | Status: SHIPPED | OUTPATIENT
Start: 2024-12-28 | End: 2025-01-27

## 2024-12-27 RX ORDER — POLYETHYLENE GLYCOL 3350 17 G/17G
17 POWDER, FOR SOLUTION ORAL DAILY
Qty: 510 G | Refills: 0 | Status: SHIPPED | OUTPATIENT
Start: 2024-12-28

## 2024-12-27 RX ORDER — SPIRONOLACTONE 25 MG/1
12.5 TABLET ORAL DAILY
Qty: 15 TABLET | Refills: 0 | Status: SHIPPED | OUTPATIENT
Start: 2024-12-28 | End: 2025-01-27

## 2024-12-27 RX ORDER — LOSARTAN POTASSIUM 25 MG/1
12.5 TABLET ORAL DAILY
Qty: 15 TABLET | Refills: 0 | Status: SHIPPED | OUTPATIENT
Start: 2024-12-28 | End: 2025-01-27

## 2024-12-27 RX ADMIN — SPIRONOLACTONE 12.5 MG: 25 TABLET ORAL at 08:32

## 2024-12-27 RX ADMIN — TAMSULOSIN HYDROCHLORIDE 0.4 MG: 0.4 CAPSULE ORAL at 08:32

## 2024-12-27 RX ADMIN — CARVEDILOL 6.25 MG: 6.25 TABLET, FILM COATED ORAL at 08:31

## 2024-12-27 RX ADMIN — APIXABAN 5 MG: 5 TABLET, FILM COATED ORAL at 08:32

## 2024-12-27 RX ADMIN — LOSARTAN POTASSIUM 12.5 MG: 25 TABLET, FILM COATED ORAL at 08:32

## 2024-12-27 RX ADMIN — CLOPIDOGREL 75 MG: 75 TABLET ORAL at 08:32

## 2024-12-27 RX ADMIN — ASPIRIN 81 MG 81 MG: 81 TABLET ORAL at 08:32

## 2024-12-27 ASSESSMENT — PAIN SCALES - GENERAL
PAINLEVEL_OUTOF10: 0 - NO PAIN
PAINLEVEL_OUTOF10: 0 - NO PAIN

## 2024-12-27 NOTE — DISCHARGE SUMMARY
Discharge Diagnosis  ST elevation myocardial infarction (STEMI), unspecified artery (Multi), angioplasty to   Left ventricular thrombus, on apixaban,    Issues Requiring Follow-Up  Scheduled to have an MRI of the heart as an outpatient   Follow-up with the cardiologist.  Follow-up with the PCP.      Discharge Meds     Medication List      START taking these medications     apixaban 5 mg tablet; Commonly known as: Eliquis; Take 1 tablet (5 mg)   by mouth 2 times a day.   aspirin 81 mg chewable tablet; Chew 1 tablet (81 mg) once daily. STOP   taking after Sunday 12/29/24   atorvastatin 80 mg tablet; Commonly known as: Lipitor; Take 1 tablet (80   mg) by mouth once daily at bedtime.   carvedilol 6.25 mg tablet; Commonly known as: Coreg; Take 1 tablet (6.25   mg) by mouth 2 times a day. Do not fill before December 24, 2024.   clopidogrel 75 mg tablet; Commonly known as: Plavix; Take 1 tablet (75   mg) by mouth once daily.     ASK your doctor about these medications     amLODIPine 10 mg tablet; Commonly known as: Norvasc; Take 1 tablet (10   mg) by mouth once daily. ----DUE FOR APPT       Test Results Pending At Discharge  Pending Labs       Order Current Status    Extra Urine Gray Tube Collected (12/25/24 1046)    Urinalysis with Reflex Culture and Microscopic In process    Blood Culture Preliminary result            Hospital Course     69-year-old -American male, who is known to history of hypertension, hyperlipidemia, smoker, occasional EtOH use, BPH, comes to the emergency department with severe chest pain, diagnosed with STEMI, he was taken to Cath Lab, had a stent done, to the  2, he is being transferred here for further management.  He also had an echocardiogram done, which showed a left ventricular thrombus, as a result he was started on apixaban,    Please see the discharge medications for the cardiac medications,       He also had hematuria, after catheterization, which got better, seen by the  urologist, he was advised to see the urologist as an outpatient,.  He was advised to quit smoking.  He will also follow-up with the labs including CBC BMP as an outpatient.  He is medically ready for discharge                  Pertinent Physical Exam At Time of Discharge  Physical Exam  Alert oriented 3.  HEENT unremarkable.  Neck no JVD.  Heart S1-S2.  Lungs reduced air entry.  Abdomen is soft nontender.  Legs no edema    Outpatient Follow-Up  Future Appointments   Date Time Provider Department Center   4/17/2025 10:15 AM [facility] MRI 1 [facility]     Discharge timing more than 35 minutes    Hernán Thacker MD

## 2024-12-27 NOTE — NURSING NOTE
Discharge instructions provided using teachback method.  Patient's health-related risk factors discussed with patient.  Patient educated to look for worsening signs and symptoms and educated to seek medical attention if experiencing medical emergency.  Patient aware of needs to follow up with outpatient clinics as scheduled. Home going meds reviewed with patient.  Patient verbalized understanding of disposition and discharge instructions.  All questions answered to patient's satisfaction and within nursing scope of practice.  Vitals stable; IV(s) removed.

## 2024-12-29 LAB — BACTERIA BLD CULT: NORMAL

## 2024-12-30 ENCOUNTER — PATIENT OUTREACH (OUTPATIENT)
Dept: PRIMARY CARE | Facility: CLINIC | Age: 69
End: 2024-12-30
Payer: COMMERCIAL

## 2024-12-30 NOTE — PROGRESS NOTES
Discharge Facility:[facility]  Discharge Diagnosis:ST Elevation MI STEMI  Admission Date:12/23/24  Discharge Date: 12/27/24    PCP Appointment Date:01/8/25  Specialist Appointment Date:   Hospital Encounter and Summary Linked: Yes  See discharge assessment below for further details    Engagement  Call Start Time: 1003 (12/30/2024 10:03 AM)    Medications  Medications reviewed with patient/caregiver?: Yes (eliquis 5mg [id number] lipitor 80mg coreg 6.25 plavix 75mg) (12/30/2024 10:03 AM)  Is the patient having any side effects they believe may be caused by any medication additions or changes?: No (12/30/2024 10:03 AM)  Does the patient have all medications ordered at discharge?: Yes (12/30/2024 10:03 AM)  Is the patient taking all medications as directed (includes completed medication regime)?: Yes (12/30/2024 10:03 AM)    Appointments  Does the patient have a primary care provider?: Yes (12/30/2024 10:03 AM)  Care Management Interventions: Verified appointment date/time/provider (12/30/2024 10:03 AM)  Has the patient kept scheduled appointments due by today?: Yes (12/30/2024 10:03 AM)    Self Management  What is the home health agency?: n/a (12/30/2024 10:03 AM)  Has home health visited the patient within 72 hours of discharge?: Not applicable (12/30/2024 10:03 AM)  What Durable Medical Equipment (DME) was ordered?: n/a (12/30/2024 10:03 AM)  Has all Durable Medical Equipment (DME) been delivered?: No (12/30/2024 10:03 AM)    Patient Teaching  Does the patient have access to their discharge instructions?: Yes (12/30/2024 10:03 AM)  Care Management Interventions: Reviewed instructions with patient (12/30/2024 10:03 AM)  What is the patient's perception of their health status since discharge?: Improving (12/30/2024 10:03 AM)  Is the patient/caregiver able to teach back the hierarchy of who to call/visit for symptoms/problems? PCP, Specialist, Home Health nurse, Urgent Care, ED, 911: Yes (12/30/2024 10:03 AM)    Wrap  Up  Wrap Up Additional Comments: discused discharge patient stated that he is doing much better .provided contact information encouraged call with questions. (12/30/2024 10:03 AM)  Call End Time: 1008 (12/30/2024 10:03 AM)

## 2025-01-03 LAB
ATRIAL RATE: 77 BPM
P AXIS: 44 DEGREES
P OFFSET: 180 MS
P ONSET: 122 MS
PR INTERVAL: 176 MS
Q ONSET: 210 MS
QRS COUNT: 13 BEATS
QRS DURATION: 128 MS
QT INTERVAL: 434 MS
QTC CALCULATION(BAZETT): 491 MS
QTC FREDERICIA: 471 MS
R AXIS: 50 DEGREES
T AXIS: 54 DEGREES
T OFFSET: 427 MS
VENTRICULAR RATE: 77 BPM

## 2025-01-08 ENCOUNTER — LAB (OUTPATIENT)
Dept: LAB | Facility: LAB | Age: 70
End: 2025-01-08
Payer: MEDICARE

## 2025-01-08 ENCOUNTER — APPOINTMENT (OUTPATIENT)
Dept: PRIMARY CARE | Facility: CLINIC | Age: 70
End: 2025-01-08
Payer: COMMERCIAL

## 2025-01-08 VITALS
BODY MASS INDEX: 27.92 KG/M2 | HEART RATE: 66 BPM | SYSTOLIC BLOOD PRESSURE: 117 MMHG | HEIGHT: 71 IN | WEIGHT: 199.4 LBS | DIASTOLIC BLOOD PRESSURE: 77 MMHG

## 2025-01-08 DIAGNOSIS — Z95.5 STATUS POST INSERTION OF DRUG ELUTING CORONARY ARTERY STENT: ICD-10-CM

## 2025-01-08 DIAGNOSIS — I10 HYPERTENSION, UNCONTROLLED: ICD-10-CM

## 2025-01-08 DIAGNOSIS — D72.829 LEUKOCYTOSIS, UNSPECIFIED TYPE: ICD-10-CM

## 2025-01-08 DIAGNOSIS — I21.3 ST ELEVATION MYOCARDIAL INFARCTION (STEMI), UNSPECIFIED ARTERY (MULTI): Primary | Chronic | ICD-10-CM

## 2025-01-08 DIAGNOSIS — E78.5 HYPERLIPIDEMIA, UNSPECIFIED HYPERLIPIDEMIA TYPE: ICD-10-CM

## 2025-01-08 LAB
BASOPHILS # BLD AUTO: 0.1 X10*3/UL (ref 0–0.1)
BASOPHILS NFR BLD AUTO: 0.8 %
EOSINOPHIL # BLD AUTO: 0.16 X10*3/UL (ref 0–0.7)
EOSINOPHIL NFR BLD AUTO: 1.2 %
ERYTHROCYTE [DISTWIDTH] IN BLOOD BY AUTOMATED COUNT: 15.2 % (ref 11.5–14.5)
HCT VFR BLD AUTO: 38.2 % (ref 41–52)
HGB BLD-MCNC: 13 G/DL (ref 13.5–17.5)
IMM GRANULOCYTES # BLD AUTO: 0.12 X10*3/UL (ref 0–0.7)
IMM GRANULOCYTES NFR BLD AUTO: 0.9 % (ref 0–0.9)
LYMPHOCYTES # BLD AUTO: 3.18 X10*3/UL (ref 1.2–4.8)
LYMPHOCYTES NFR BLD AUTO: 24.5 %
MCH RBC QN AUTO: 31 PG (ref 26–34)
MCHC RBC AUTO-ENTMCNC: 34 G/DL (ref 32–36)
MCV RBC AUTO: 91 FL (ref 80–100)
MONOCYTES # BLD AUTO: 1.41 X10*3/UL (ref 0.1–1)
MONOCYTES NFR BLD AUTO: 10.9 %
NEUTROPHILS # BLD AUTO: 8 X10*3/UL (ref 1.2–7.7)
NEUTROPHILS NFR BLD AUTO: 61.7 %
NRBC BLD-RTO: 0 /100 WBCS (ref 0–0)
PLATELET # BLD AUTO: 431 X10*3/UL (ref 150–450)
RBC # BLD AUTO: 4.2 X10*6/UL (ref 4.5–5.9)
WBC # BLD AUTO: 13 X10*3/UL (ref 4.4–11.3)

## 2025-01-08 PROCEDURE — G2211 COMPLEX E/M VISIT ADD ON: HCPCS | Performed by: INTERNAL MEDICINE

## 2025-01-08 PROCEDURE — 3008F BODY MASS INDEX DOCD: CPT | Performed by: INTERNAL MEDICINE

## 2025-01-08 PROCEDURE — 1158F ADVNC CARE PLAN TLK DOCD: CPT | Performed by: INTERNAL MEDICINE

## 2025-01-08 PROCEDURE — 85025 COMPLETE CBC W/AUTO DIFF WBC: CPT

## 2025-01-08 PROCEDURE — 1159F MED LIST DOCD IN RCRD: CPT | Performed by: INTERNAL MEDICINE

## 2025-01-08 PROCEDURE — 3074F SYST BP LT 130 MM HG: CPT | Performed by: INTERNAL MEDICINE

## 2025-01-08 PROCEDURE — 1123F ACP DISCUSS/DSCN MKR DOCD: CPT | Performed by: INTERNAL MEDICINE

## 2025-01-08 PROCEDURE — 3078F DIAST BP <80 MM HG: CPT | Performed by: INTERNAL MEDICINE

## 2025-01-08 PROCEDURE — 99214 OFFICE O/P EST MOD 30 MIN: CPT | Performed by: INTERNAL MEDICINE

## 2025-01-08 PROCEDURE — 1160F RVW MEDS BY RX/DR IN RCRD: CPT | Performed by: INTERNAL MEDICINE

## 2025-01-08 PROCEDURE — 1111F DSCHRG MED/CURRENT MED MERGE: CPT | Performed by: INTERNAL MEDICINE

## 2025-01-08 RX ORDER — SPIRONOLACTONE 25 MG/1
12.5 TABLET ORAL DAILY
Qty: 45 TABLET | Refills: 1 | Status: SHIPPED | OUTPATIENT
Start: 2025-01-08 | End: 2025-01-16 | Stop reason: SDUPTHER

## 2025-01-08 RX ORDER — CARVEDILOL 6.25 MG/1
6.25 TABLET ORAL 2 TIMES DAILY
Qty: 180 TABLET | Refills: 1 | Status: SHIPPED | OUTPATIENT
Start: 2025-01-08 | End: 2025-01-16 | Stop reason: SDUPTHER

## 2025-01-08 RX ORDER — ATORVASTATIN CALCIUM 80 MG/1
80 TABLET, FILM COATED ORAL NIGHTLY
Qty: 90 TABLET | Refills: 1 | Status: SHIPPED | OUTPATIENT
Start: 2025-01-08 | End: 2025-01-16 | Stop reason: SDUPTHER

## 2025-01-08 RX ORDER — LOSARTAN POTASSIUM 25 MG/1
12.5 TABLET ORAL DAILY
Qty: 45 TABLET | Refills: 1 | Status: SHIPPED | OUTPATIENT
Start: 2025-01-08 | End: 2025-01-16 | Stop reason: SDUPTHER

## 2025-01-08 ASSESSMENT — ENCOUNTER SYMPTOMS: CONSTITUTIONAL NEGATIVE: 1

## 2025-01-08 NOTE — PROGRESS NOTES
"Patient ID: Peña Arenas is a 69 y.o. male who presents for Follow-up (Hospital follow up ).    /77   Pulse 66   Ht 1.803 m (5' 11\")   Wt 90.4 kg (199 lb 6.4 oz)   BMI 27.81 kg/m²     HPI      Patient is here for for hospital follow-up      He was evaluated in the emergency at [facility]  On 12/23/2024, for chest pain, diagnosed with ST elevation myocardial infarction angioplasty to  and there is also left ventricular thrombus      He was started on apixaban 5 mg 1 tablet twice daily  Aspirin 81 mg, discontinued on 12/29/2024  Clopidogrel 75 mg every day  Atorvastatin 80 mg every day  Carvedilol 6.25 mg 1 tablet twice daily    I reviewed the cardiology consultation note  Reviewed echo result which shows left ventricular thrombus    Patient is scheduled to have an MRI of the heart as an outpatient  He is also scheduled to follow-up with the cardiologist    He is not having any more chest pain  No shortness of breath, no PND, no orthopnea    He is other history significant for hypertension  Hyperlipidemia        Subjective     Review of Systems   Constitutional: Negative.    All other systems reviewed and are negative.      Objective     Physical Exam  Vitals and nursing note reviewed.   Neck:      Vascular: No carotid bruit.   Cardiovascular:      Rate and Rhythm: Normal rate and regular rhythm.      Pulses: Normal pulses.      Heart sounds: Normal heart sounds.   Pulmonary:      Effort: Pulmonary effort is normal.      Breath sounds: Normal breath sounds.   Abdominal:      Palpations: There is no mass.   Musculoskeletal:      Right lower leg: No edema.      Left lower leg: No edema.   Skin:     Capillary Refill: Capillary refill takes more than 3 seconds.   Neurological:      General: No focal deficit present.      Mental Status: He is oriented to person, place, and time. Mental status is at baseline.   Psychiatric:         Mood and Affect: Mood normal.         Behavior: Behavior normal.    "      Thought Content: Thought content normal.         Judgment: Judgment normal.         Lab Results   Component Value Date    WBC 13.0 (H) 01/08/2025    HGB 13.0 (L) 01/08/2025    HCT 38.2 (L) 01/08/2025    MCV 91 01/08/2025     01/08/2025           Problem List Items Addressed This Visit       Hyperlipidemia    Relevant Orders    Lipid panel    Hypertension, uncontrolled    Relevant Medications    losartan (Cozaar) 25 mg tablet    spironolactone (Aldactone) 25 mg tablet    ST elevation myocardial infarction (STEMI), unspecified artery (Multi) - Primary    Relevant Medications    atorvastatin (Lipitor) 80 mg tablet    carvedilol (Coreg) 6.25 mg tablet     Other Visit Diagnoses       Status post insertion of drug eluting coronary artery stent        Relevant Medications    atorvastatin (Lipitor) 80 mg tablet    carvedilol (Coreg) 6.25 mg tablet    Leukocytosis, unspecified type        Relevant Orders    CBC and Auto Differential (Completed)            A/P        Atorvastatin 80 mg 1 tablet every day filled  Carvedilol 6.25 mg 1 tablet twice daily filled  Losartan 25 mg 1 tablet every day filled  Spironolactone 25 mg half tablet every day filled  Will get CBC  Follow-up with me as needed

## 2025-01-09 ENCOUNTER — PATIENT OUTREACH (OUTPATIENT)
Dept: PRIMARY CARE | Facility: CLINIC | Age: 70
End: 2025-01-09
Payer: MEDICARE

## 2025-01-09 NOTE — PROGRESS NOTES
Call regarding appt. with PCP on 1/8/25  after hospitalization.  At time of outreach call the patient feels as if their condition has improved  since last visit.  Reviewed the PCP appointment with the pt and addressed any questions or concerns.

## 2025-01-13 ENCOUNTER — TELEPHONE (OUTPATIENT)
Dept: PRIMARY CARE | Facility: CLINIC | Age: 70
End: 2025-01-13
Payer: MEDICARE

## 2025-01-13 DIAGNOSIS — D72.829 LEUKOCYTOSIS, UNSPECIFIED TYPE: Primary | ICD-10-CM

## 2025-01-16 ENCOUNTER — OFFICE VISIT (OUTPATIENT)
Dept: CARDIOLOGY | Facility: CLINIC | Age: 70
End: 2025-01-16
Payer: MEDICARE

## 2025-01-16 VITALS
RESPIRATION RATE: 18 BRPM | SYSTOLIC BLOOD PRESSURE: 133 MMHG | OXYGEN SATURATION: 100 % | HEART RATE: 53 BPM | WEIGHT: 200 LBS | HEIGHT: 73 IN | DIASTOLIC BLOOD PRESSURE: 90 MMHG | BODY MASS INDEX: 26.51 KG/M2

## 2025-01-16 DIAGNOSIS — R31.0 GROSS HEMATURIA: ICD-10-CM

## 2025-01-16 DIAGNOSIS — I10 BENIGN ESSENTIAL HYPERTENSION: Primary | ICD-10-CM

## 2025-01-16 DIAGNOSIS — I21.3 ST ELEVATION MYOCARDIAL INFARCTION (STEMI), UNSPECIFIED ARTERY (MULTI): Chronic | ICD-10-CM

## 2025-01-16 DIAGNOSIS — I21.3 STEMI (ST ELEVATION MYOCARDIAL INFARCTION) (MULTI): ICD-10-CM

## 2025-01-16 DIAGNOSIS — I21.02 ST ELEVATION (STEMI) MYOCARDIAL INFARCTION INVOLVING LEFT ANTERIOR DESCENDING CORONARY ARTERY (MULTI): ICD-10-CM

## 2025-01-16 DIAGNOSIS — Z95.5 STATUS POST INSERTION OF DRUG ELUTING CORONARY ARTERY STENT: ICD-10-CM

## 2025-01-16 DIAGNOSIS — N40.1 BENIGN PROSTATIC HYPERPLASIA WITH LOWER URINARY TRACT SYMPTOMS, SYMPTOM DETAILS UNSPECIFIED: ICD-10-CM

## 2025-01-16 DIAGNOSIS — I10 HYPERTENSION, UNCONTROLLED: ICD-10-CM

## 2025-01-16 DIAGNOSIS — I21.29: ICD-10-CM

## 2025-01-16 PROCEDURE — 99214 OFFICE O/P EST MOD 30 MIN: CPT | Performed by: INTERNAL MEDICINE

## 2025-01-16 PROCEDURE — 1160F RVW MEDS BY RX/DR IN RCRD: CPT | Performed by: INTERNAL MEDICINE

## 2025-01-16 PROCEDURE — 3008F BODY MASS INDEX DOCD: CPT | Performed by: INTERNAL MEDICINE

## 2025-01-16 PROCEDURE — 3080F DIAST BP >= 90 MM HG: CPT | Performed by: INTERNAL MEDICINE

## 2025-01-16 PROCEDURE — 1111F DSCHRG MED/CURRENT MED MERGE: CPT | Performed by: INTERNAL MEDICINE

## 2025-01-16 PROCEDURE — 99214 OFFICE O/P EST MOD 30 MIN: CPT | Mod: 25 | Performed by: INTERNAL MEDICINE

## 2025-01-16 PROCEDURE — 1159F MED LIST DOCD IN RCRD: CPT | Performed by: INTERNAL MEDICINE

## 2025-01-16 PROCEDURE — 3075F SYST BP GE 130 - 139MM HG: CPT | Performed by: INTERNAL MEDICINE

## 2025-01-16 PROCEDURE — 93005 ELECTROCARDIOGRAM TRACING: CPT | Performed by: INTERNAL MEDICINE

## 2025-01-16 PROCEDURE — 93010 ELECTROCARDIOGRAM REPORT: CPT | Performed by: INTERNAL MEDICINE

## 2025-01-16 PROCEDURE — G2211 COMPLEX E/M VISIT ADD ON: HCPCS | Performed by: INTERNAL MEDICINE

## 2025-01-16 PROCEDURE — 1123F ACP DISCUSS/DSCN MKR DOCD: CPT | Performed by: INTERNAL MEDICINE

## 2025-01-16 RX ORDER — CLOPIDOGREL BISULFATE 75 MG/1
75 TABLET ORAL DAILY
Qty: 30 TABLET | Refills: 11 | Status: SHIPPED | OUTPATIENT
Start: 2025-01-16 | End: 2026-01-16

## 2025-01-16 RX ORDER — SPIRONOLACTONE 25 MG/1
12.5 TABLET ORAL DAILY
Qty: 45 TABLET | Refills: 3 | Status: SHIPPED | OUTPATIENT
Start: 2025-01-16 | End: 2026-01-16

## 2025-01-16 RX ORDER — LOSARTAN POTASSIUM 25 MG/1
25 TABLET ORAL DAILY
Qty: 90 TABLET | Refills: 3 | Status: SHIPPED | OUTPATIENT
Start: 2025-01-16 | End: 2026-01-16

## 2025-01-16 RX ORDER — CARVEDILOL 6.25 MG/1
6.25 TABLET ORAL 2 TIMES DAILY
Qty: 180 TABLET | Refills: 3 | Status: SHIPPED | OUTPATIENT
Start: 2025-01-16 | End: 2026-01-16

## 2025-01-16 RX ORDER — ATORVASTATIN CALCIUM 80 MG/1
80 TABLET, FILM COATED ORAL NIGHTLY
Qty: 90 TABLET | Refills: 3 | Status: SHIPPED | OUTPATIENT
Start: 2025-01-16 | End: 2026-01-16

## 2025-01-16 ASSESSMENT — ENCOUNTER SYMPTOMS: DEPRESSION: 0

## 2025-01-16 NOTE — ASSESSMENT & PLAN NOTE
Referral to Urology for additional assessment   -- Needs Antiplatelet therapy through 6/2025  -- Needs OAC until at least 4/2025 when cMRI

## 2025-01-16 NOTE — PROGRESS NOTES
Referred by Baron Schafer DO for Post-Cath and Hospital Follow-up     HPI:    Peña Arenas is a 69 y.o. male with pertinent history of   has a past medical history of Essential (primary) hypertension (10/14/2013) and Personal history of colonic polyps (01/13/2015). who presents to cardiology clinic to establish care.     ***  No exacerbating or relieving factors.  Patient denies chest pain and angina.  Pt denies orthopnea, and paroxysmal nocturnal dyspnea.  Pt denies worsening lower extremity edema.  Pt denies palpitations or syncope.  No recent falls.  No fever or chills.  No cough.  No change in bowel or bladder habits.  No sick contacts.  No recent travel.    12 point review of systems including (Constitutional, Eyes, ENMT, Respiratory, Cardiac, Gastrointestinal, Neurological, Psychiatric, and Hematologic) was performed and is otherwise negative.    Past medical history reviewed:   has a past medical history of Essential (primary) hypertension (10/14/2013) and Personal history of colonic polyps (01/13/2015).    Past surgical history reviewed:   has a past surgical history that includes Other surgical history (10/21/2014); Colonoscopy (10/21/2014); Cardiac catheterization (N/A, 12/23/2024); and Cardiac catheterization (N/A, 12/23/2024).    Social history reviewed:   reports that he has been smoking cigars. He has never used smokeless tobacco. He reports that he does not currently use alcohol. He reports that he does not use drugs.     Family history reviewed:    Family History   Problem Relation Name Age of Onset    Hypertension Mother      Cancer Father      Lung disease Sister      Stroke Cousin          mid 50s       Allergies reviewed: Patient has no known allergies.     Medications reviewed:   Current Outpatient Medications   Medication Instructions    apixaban (ELIQUIS) 5 mg, oral, 2 times daily    atorvastatin (LIPITOR) 80 mg, oral, Nightly    carvedilol (COREG) 6.25 mg, oral, 2 times daily     clopidogrel (PLAVIX) 75 mg, oral, Daily    losartan (COZAAR) 25 mg, oral, Daily    polyethylene glycol (GLYCOLAX, MIRALAX) 17 g, oral, Daily    spironolactone (ALDACTONE) 12.5 mg, oral, Daily    tamsulosin (FLOMAX) 0.4 mg, oral, Daily        Vitals reviewed: Visit Vitals  /90 (BP Location: Left arm, Patient Position: Sitting, BP Cuff Size: Adult)   Pulse 53   Resp 18       Physical Exam:     General:  Patient is awake, alert, and oriented.  Patient is in no acute distress.  HEENT:  Pupils equal and reactive.  Normocephalic.  Moist mucosa.    Neck:  No thyromegaly.  Normal Jugular Venous Pressure.  Cardiovascular:  Regular rate and rhythm.  Normal S1 and S2.  1/6 DONNY.  Pulmonary:  Clear to auscultation bilaterally.  Abdomen:  Soft. Non-tender.   Non-distended.  Positive bowel sounds.  Lower Extremities:  2+ pedal pulses. No LE edema.  Neurologic:  Cranial nerves intact.  No focal deficit.   Skin: Skin warm and dry, normal skin turgor.   Psychiatric: Normal affect.    Last Labs:  CBC -      Lab Results   Component Value Date    WBC 13.0 (H) 01/08/2025    HGB 13.0 (L) 01/08/2025    HCT 38.2 (L) 01/08/2025     01/08/2025        CMP-  Lab Results   Component Value Date    GLUCOSE 101 (H) 12/27/2024     12/27/2024    K 4.0 12/27/2024     12/27/2024    CO2 23 12/27/2024    ANIONGAP 12 12/27/2024    BUN 13 12/27/2024    CREATININE 1.19 12/27/2024    EGFR 67 12/27/2024    CALCIUM 8.4 (L) 12/27/2024    PHOS 2.5 12/25/2024    PROT 8.0 12/23/2024    ALBUMIN 3.0 (L) 12/25/2024    AST 79 (H) 12/23/2024    ALT 20 12/23/2024    ALKPHOS 74 12/23/2024    BILITOT 0.6 12/23/2024        LIPIDS-  Lab Results   Component Value Date    CHOL 177 12/24/2024    TRIG 91 12/24/2024    HDL 39.4 12/24/2024    CHHDL 4.5 12/24/2024    VLDL 18 12/24/2024        OTHERS-  Lab Results   Component Value Date    HGBA1C 5.8 (H) 12/24/2024        I personally reviewed the patient's recent vitals, labs, medications, orders, EKGs,  pertinent cardiac imaging/ echocardiography and ischemic evaluations including stress testing/ cardiac catheterization.    Assessment and Plan:  Problem List Items Addressed This Visit       Benign essential hypertension - Primary    Relevant Orders    ECG 12 lead (Clinic Performed)    Comprehensive metabolic panel    CBC    BPH (benign prostatic hyperplasia)    Relevant Orders    Referral to Urology    Comprehensive metabolic panel    CBC    Gross hematuria    Overview     Gross Hematuria noted after Cath when on Triple therapy   -- Had mcwilliams catheter briefly   -- Reports resolving, occasional spotting remaining          Current Assessment & Plan     Referral to Urology for additional assessment   -- Needs Antiplatelet therapy through 6/2025  -- Needs OAC until at least 4/2025 when cMRI         Relevant Orders    Referral to Urology    Comprehensive metabolic panel    CBC    Hypertension, uncontrolled    Overview     Carvedilol ( Coreg ) 6.25 mg   Losartan ( Cozaar) 25 mg daily   Spironolactone ( Aldactone) I 12.5 mg          Relevant Medications    losartan (Cozaar) 25 mg tablet    spironolactone (Aldactone) 25 mg tablet    Other Relevant Orders    Comprehensive metabolic panel    CBC    ST elevation myocardial infarction (STEMI), unspecified artery (Multi)    Overview     Inferior and lateral STEMI  -Patient presented with chest discomfort as yesterday.  ECG on presentation showed normal sinus rhythm with ST elevation in inferior and lateral leads.  He was taken for urgent coronary angiogram which revealed 99% thrombotic occlusion of the branch of OM 2 otherwise mild to moderate CAD elsewhere with subsequent PCI 2.5 22 mm GEM.  - Given Thrombus formation on Echocardiogram, adjust medicaitons as follows   --ACS Regimen will be Clopidogrel 75 mg Daily  + Apixaban 5 mg BID due to LV Thrombus in setting of STEMI   - Cardiac MRI in te O/P setting has been ordered          Current Assessment & Plan     Remains stable  at this time   -- Medication compliance reinforced   -- Cardiac MRI Scheduled; he is to continue medications ( Apixaban 5mg BID + Clopidogrel 75 mg Daily )   -- Cardiac Rehab referral            Relevant Medications    atorvastatin (Lipitor) 80 mg tablet    carvedilol (Coreg) 6.25 mg tablet    clopidogrel (Plavix) 75 mg tablet    Other Relevant Orders    Troponin I, High Sensitivity    B-Type Natriuretic Peptide    Comprehensive metabolic panel    CBC     Other Visit Diagnoses       STEMI (ST elevation myocardial infarction) (Multi)        Relevant Medications    apixaban (Eliquis) 5 mg tablet    carvedilol (Coreg) 6.25 mg tablet    clopidogrel (Plavix) 75 mg tablet    Other Relevant Orders    Referral to Cardiac Rehab    Troponin I, High Sensitivity    B-Type Natriuretic Peptide    Comprehensive metabolic panel    CBC    Troponin I, High Sensitivity    B-Type Natriuretic Peptide    Comprehensive metabolic panel    CBC    ST elevation (STEMI) myocardial infarction involving left anterior descending coronary artery (Multi)        Relevant Medications    apixaban (Eliquis) 5 mg tablet    carvedilol (Coreg) 6.25 mg tablet    clopidogrel (Plavix) 75 mg tablet    Other Relevant Orders    Referral to Cardiac Rehab    Troponin I, High Sensitivity    B-Type Natriuretic Peptide    Comprehensive metabolic panel    CBC    LV (left ventricular) mural thrombus with acute MI (Multi)        Relevant Medications    apixaban (Eliquis) 5 mg tablet    carvedilol (Coreg) 6.25 mg tablet    clopidogrel (Plavix) 75 mg tablet    Other Relevant Orders    Referral to Cardiac Rehab    Troponin I, High Sensitivity    B-Type Natriuretic Peptide    Comprehensive metabolic panel    CBC    Status post insertion of drug eluting coronary artery stent        Relevant Medications    atorvastatin (Lipitor) 80 mg tablet    carvedilol (Coreg) 6.25 mg tablet    Other Relevant Orders    Troponin I, High Sensitivity    Comprehensive metabolic panel    CBC               Please followup with me in Cardiology clinic within the next .  Please return to clinic sooner or seek emergent care if your symptoms reoccur or worsen.    Thank you for allowing me to participate in their care.  Please feel free to call me with any further questions or concerns.        Baron Schafer DO   Division of Cardiovascular Medicine  [facility] [place], [facility]

## 2025-01-16 NOTE — PATIENT INSTRUCTIONS
It was a pleasure seeing you today. I would like to see you back in clinic in  late April.  You can call my office if questions arise between now and our next visit.     Today, we talked about your heart attack     You need to remain on your medications    ++ Apixaban ( Eliquis ) 5 mg Twice Daily  --> blood thinner to reduce blood clot formation within the heart    ++ Clopidogrel ( Plavix) 75 mg daily --> Anti-platelet that helps prevent platelets from sticking and creating a smaller blood clot.     Atorvastatin ( lipitor) 80 mg taken once daily is designed to help lower cholesterol and stabilize plaque    Carvedilol ( Coreg ) 6.25 mg taken twice daily as a beta-blocker designed to help lower blood pressure and heart rates to help your heart heal    Losartan ( Cozaar) 25 mg daily is a blood pressure medication that also helps the heart to heal.  We are increasing this from your current dose of 12.5 mg to 25 mg daily beginning 1/17/2025    Spironolactone ( Aldactone) is a blood pressure medication that also helps your heart to heal.  This helps to prevent remodeling changes in the anterior walls.  You will remain on 12.5 mg once daily.    CURRENTLY YOU DO NOT NEED BABY ( 81) mg Aspirin as you are taking the Clopidogrel and the Apixaban. The risk of bleeding is significantly increased if you take all three medications        Please stop smoking.    --Try to cut back on the number of cigarettes that you smoke.    -- Try to increase the interval between cigarettes.   -- Try to use substitutes such as sugar-free candy carrots,celery sticks as in between.  --  Once you are down to less than half a pack a day try to go cold turkey.   -- Try to designate areas in the your home as smoke-free areas.   -- Try to reduce the number of ashtrays and other reminders of smoking.   -- Try to keep any major something that you dislike next to your cigarette pack to try to develop a mental aversion to smoking      Below are some Heart  "Health Tips that we provide to all of our patients. I hope you fing them useful.     - If you are having problems with medications, consider looking at the following websites.   --  \"GoodRx\"   --  \"[place] [facility]\"      - We are happy to supply written prescriptions if needed to allow you to obtain your medications from different pharmacies. Additionally, if you are having issues with mail order delivery, please let us know. We can send a limited supply of your medications to your local pharmacy.     -  We recommend you follow a heart healthy diet. Watch food labels and try not to eat more than 2,500 mg of sodium per day. Avoid foods high in salt like processed meats (lunch meats, marrero, and sausage), processed foods (boxed dinners, canned soups), fried and fast foods. Monitor serving sizes and if the sodium per serving size is more than 200 mg, avoid those foods. If the sodium per serving size is between 100-200 mg, you can use those in limited quantities. Try to choose foods where the amount of sodium per serving size is less than 100 mg. Try to eat a diet rich in fruits and vegetables, whole grains, low fat dairy products, skinless poultry and fish, nuts, beans, non-tropical vegetable oils. Limit saturated fat, trans fat, sodium, red meats, and sugar-sweetened beverages.   Limit alcohol     -The combination of a reduced-calorie diet and increased physical activity is recommended. Adults should aim to get at least 150 minutes of moderate physical activity per week (30 minutes of moderate physical activities at least 5 days per week). Examples of moderate physical activities include brisk walking, swimming, aerobic dancing, heavy gardening, jumping rope, bicycling 10 MPH or faster, tennis, hiking uphill or with a heavy backpack. Please let us know if you would like to learn more about your nutrition and calories and additional options including weight loss programs to help you reach your goal.     " -If you smoke, stop smoking. If you stop smoking you can help get rid of a major source of stress to your heart. Smoking makes your heart rate and blood pressure go up and increases your risk or developing cardiovascular diseases and worsen symptoms associated with heart failure.     -Obtain a BP monitor and monitor your BP daily. Check it around the same time each day; at least 1 hour after taking your medications. Record your BP in a log and bring your log with you to your doctors appointment.     -F/u with your PCP as recommended.

## 2025-01-16 NOTE — ASSESSMENT & PLAN NOTE
Remains stable at this time   -- Medication compliance reinforced   -- Cardiac MRI Scheduled; he is to continue medications ( Apixaban 5mg BID + Clopidogrel 75 mg Daily )   -- Cardiac Rehab referral

## 2025-01-17 ENCOUNTER — TELEPHONE (OUTPATIENT)
Dept: CARDIAC REHAB | Facility: HOSPITAL | Age: 70
End: 2025-01-17
Payer: MEDICARE

## 2025-01-17 LAB
ATRIAL RATE: 51 BPM
P AXIS: 29 DEGREES
P OFFSET: 184 MS
P ONSET: 129 MS
PR INTERVAL: 164 MS
Q ONSET: 211 MS
QRS COUNT: 9 BEATS
QRS DURATION: 128 MS
QT INTERVAL: 512 MS
QTC CALCULATION(BAZETT): 471 MS
QTC FREDERICIA: 485 MS
R AXIS: -26 DEGREES
T AXIS: 246 DEGREES
T OFFSET: 467 MS
VENTRICULAR RATE: 51 BPM

## 2025-01-17 NOTE — PROGRESS NOTES
PATIENT: Peña Arenas  DATE: 1/17/2025    Called patient regarding scheduling Cardiac Rehab at [facility]. Left voicemail to call our offices to get scheduled upon earliest convenience at [phone].    Marci Gary EP

## 2025-01-20 ENCOUNTER — TELEPHONE (OUTPATIENT)
Dept: CARDIOLOGY | Facility: CLINIC | Age: 70
End: 2025-01-20
Payer: MEDICARE

## 2025-01-20 NOTE — TELEPHONE ENCOUNTER
The pharmacist from [facility] called to verify that Mr Arenas should be on both Eliquis and Plavix. After review of Mr Arenas's chart and Dr Schafer's last office visit note and patient instructions, this nurse informed the pharmacist that yes, Mr Arenas should be on both Eliquis and Plavix. Pharmacist verbalized understanding.

## 2025-01-23 ENCOUNTER — PATIENT OUTREACH (OUTPATIENT)
Dept: PRIMARY CARE | Facility: CLINIC | Age: 70
End: 2025-01-23
Payer: MEDICARE

## 2025-02-03 ENCOUNTER — TELEPHONE (OUTPATIENT)
Dept: PRIMARY CARE | Facility: CLINIC | Age: 70
End: 2025-02-03
Payer: MEDICARE

## 2025-02-03 NOTE — TELEPHONE ENCOUNTER
Pt came in stating that he needs a referral for podiatry the podiatrist name is Dmitry Ahumada that is who the referral needs to go to.

## 2025-02-05 ENCOUNTER — CLINICAL SUPPORT (OUTPATIENT)
Dept: CARDIAC REHAB | Facility: CLINIC | Age: 70
End: 2025-02-05
Payer: MEDICARE

## 2025-02-05 VITALS
HEART RATE: 56 BPM | OXYGEN SATURATION: 98 % | SYSTOLIC BLOOD PRESSURE: 134 MMHG | RESPIRATION RATE: 14 BRPM | BODY MASS INDEX: 26.35 KG/M2 | WEIGHT: 198.8 LBS | DIASTOLIC BLOOD PRESSURE: 82 MMHG | HEIGHT: 73 IN

## 2025-02-05 DIAGNOSIS — I21.02 ST ELEVATION (STEMI) MYOCARDIAL INFARCTION INVOLVING LEFT ANTERIOR DESCENDING CORONARY ARTERY (MULTI): ICD-10-CM

## 2025-02-05 DIAGNOSIS — I21.3 STEMI (ST ELEVATION MYOCARDIAL INFARCTION) (MULTI): ICD-10-CM

## 2025-02-05 DIAGNOSIS — I21.29: ICD-10-CM

## 2025-02-05 RX ORDER — TAMSULOSIN HYDROCHLORIDE 0.4 MG/1
0.4 CAPSULE ORAL DAILY
COMMUNITY

## 2025-02-05 ASSESSMENT — DUKE ACTIVITY SCORE INDEX (DASI)
CAN YOU PARTICIPATE IN STRENOUS SPORTS LIKE SWIMMING, SINGLES TENNIS, FOOTBALL, BASKETBALL, OR SKIING: NO
CAN YOU HAVE SEXUAL RELATIONS: NO
TOTAL_SCORE: 16.2
CAN YOU WALK INDOORS, SUCH AS AROUND YOUR HOUSE: YES
CAN YOU CLIMB A FLIGHT OF STAIRS OR WALK UP A HILL: YES
DASI METS SCORE: 4.7
CAN YOU PARTICIPATE IN MODERATE RECREATIONAL ACTIVITIES LIKE GOLF, BOWLING, DANCING, DOUBLES TENNIS OR THROWING A BASEBALL OR FOOTBALL: NO
CAN YOU DO YARD WORK LIKE RAKING LEAVES, WEEDING OR PUSHING A MOWER: NO
CAN YOU RUN A SHORT DISTANCE: NO
CAN YOU TAKE CARE OF YOURSELF (EAT, DRESS, BATHE, OR USE TOILET): YES
CAN YOU DO HEAVY WORK AROUND THE HOUSE LIKE SCRUBBING FLOORS OR LIFTING AND MOVING HEAVY FURNITURE: NO
CAN YOU WALK A BLOCK OR TWO ON LEVEL GROUND: NO
CAN YOU DO MODERATE WORK AROUND THE HOUSE LIKE VACUUMING, SWEEPING FLOORS OR CARRYING GROCERIES: YES
CAN YOU DO LIGHT WORK AROUND THE HOUSE LIKE DUSTING OR WASHING DISHES: YES

## 2025-02-05 ASSESSMENT — PATIENT HEALTH QUESTIONNAIRE - PHQ9
SUM OF ALL RESPONSES TO PHQ QUESTIONS 1-9: 1
4. FEELING TIRED OR HAVING LITTLE ENERGY: SEVERAL DAYS
SUM OF ALL RESPONSES TO PHQ QUESTIONS 1-9: 1
2. FEELING DOWN, DEPRESSED OR HOPELESS: NOT AT ALL
7. TROUBLE CONCENTRATING ON THINGS, SUCH AS READING THE NEWSPAPER OR WATCHING TELEVISION: NOT AT ALL
SUM OF ALL RESPONSES TO PHQ9 QUESTIONS 1 & 2: 0
9. THOUGHTS THAT YOU WOULD BE BETTER OFF DEAD, OR OF HURTING YOURSELF: NOT AT ALL
3. TROUBLE FALLING OR STAYING ASLEEP OR SLEEPING TOO MUCH: NOT AT ALL
6. FEELING BAD ABOUT YOURSELF - OR THAT YOU ARE A FAILURE OR HAVE LET YOURSELF OR YOUR FAMILY DOWN: NOT AT ALL
8. MOVING OR SPEAKING SO SLOWLY THAT OTHER PEOPLE COULD HAVE NOTICED. OR THE OPPOSITE, BEING SO FIGETY OR RESTLESS THAT YOU HAVE BEEN MOVING AROUND A LOT MORE THAN USUAL: NOT AT ALL
1. LITTLE INTEREST OR PLEASURE IN DOING THINGS: NOT AT ALL
5. POOR APPETITE OR OVEREATING: NOT AT ALL

## 2025-02-05 ASSESSMENT — PAIN SCALES - GENERAL: PAINLEVEL_OUTOF10: 0-NO PAIN

## 2025-02-05 NOTE — PROGRESS NOTES
Cardiac Rehabilitation Initial Treatment Plan    Name: Peña Arenas  Medical Record Number: [id number]  YOB: 1955  Age: 69 y.o.    Today’s Date: 2/5/2025  Primary Care Physician: Tiny Rodriguez MD  Referring Physician: Baron Schafer DO  Program Location: [address]  Primary Diagnosis:   1. STEMI (ST elevation myocardial infarction) (Multi)  Referral to Cardiac Rehab      2. ST elevation (STEMI) myocardial infarction involving left anterior descending coronary artery (Multi)  Referral to Cardiac Rehab      3. LV (left ventricular) mural thrombus with acute MI (Multi)  Referral to Cardiac Rehab         Onset/Date of Diagnosis: 12/23/2025    Initial Assessment, not yet started program.    AACVPR Risk Stratification: High     Falls Risk: Low  Psychosocial Assessment     Pre PHQ-9: 1      Sent PH-Q 9 to MD if score > 20: No; score < 20    Pt reported/currently experiencing stress: No  Patient uses stress management skills: No   History of: no history of anxiety or depression  Currently seeing a mental health provider: No  Social Support: Yes, Whom:wife  Quality of Life Survey: SF-36   SF-36 Pre Post   Physical Component Score  TBD   Mental Component Score  TBD     Learning Assessment:  Learning assessment/barriers: Work  Preferred learning method: Visual  Barriers: None  Comments:    Stages of Change:Preparation    Psychosocial Plan    Goal Status: Initial Assessment; goals not yet started    Psychosocial Goals: Maintain or lower PH-Q 9 score by discharge    Psychosocial Interventions/Education: To be done in Cardiac Rehab.    Initial Assessment: not yet started    Nutrition Assessment:    Hyperlipidemia: Yes     Lipids:   Lab Results   Component Value Date    CHOL 177 12/24/2024    HDL 39.4 12/24/2024    LDLF 183 (H) 03/14/2023    TRIG 91 12/24/2024       Current Dietary Guidelines: Low fat  Barriers to dietary change: no    Diet Habit Survey: Picture Your Plate  Pre:   50  Post: To be done at discharge.    Diabetes Assessment    Lab Results   Component Value Date    HGBA1C 5.8 (H) 12/24/2024       History of Diabetes: No    Weight Management    Height: 182.9 cm (6')  Weight: 90.2 kg (198 lb 12.8 oz)  BMI (Calculated): 26.96      Nutrition Plan    Goal Status: Initial Assessment; goals not yet started    Nutrition Goals: Lipid Goal: HDL>45, LDL <70, Total <180, Trigs <150, Improve Diet Habit Survey score by 5-10 points by discharge, Adapt a Mediterranean focused diet prior to discharge, and Learn how to read and interpret nutrition labels prior to discharge    Nutrition Interventions/Education:   To be done in Cardiac Rehab.    Initial Assessment: not yet started    Exercise Assessment    Home Exercise: No  Mode: NA  Frequency: NA  Duration: NA    Exercise Prescription     Exercise Prescription based on: Duke Activity Status Index (DASI)    DASI Score: 16.2   MET Score: 4.7   Frequency:  3 days/week   Mode: Treadmill, NuStep, and Recumbent Cycle   Duration: 21 total aerobic minutes   Intensity: RPE 12-16  Target HR:  To be calculated after 6 attended sessions.  MET Level: 2.4  Patient wears supplemental O2: No     Modality Workload METs Duration (minutes)   1 Pre-Exercise      2 Treadmill 1.8 mph  2.4 8 :00   3 NuStep 50 Montano @ Load 4  2.4 8 :00   4 Recumbent Bike Load 3 @ 50 rpm  2.4 8 :00   5 Cooldown    5 :00   6 Post-Exercise        Resistance Training: No   Home Exercise Prescription given: To be given prior to discharge from program.    Exercise Plan    Goal Status: Initial Assessment; goals not yet started    Exercise Goals: Increase total exercise duration to 30-45 minutes, Obtain 150 minutes/week of moderate intensity aerobic exercise, and Establish a home exercise program before discharge    Exercise Interventions/Education:   To be done in Cardiac Rehab.    Initial Assessment: not yet started    Other Core Components/Risk Factor Assessment:    Medication  adherence  Current Medications:   Medication Documentation Review Audit       Reviewed by Chelita Cee RN (Registered Nurse) on 02/05/25 at 1408      Medication Order Taking? Sig Documenting Provider Last Dose Status   apixaban (Eliquis) 5 mg tablet [id number] Yes Take 1 tablet (5 mg) by mouth 2 times a day. Baron Schafer DO  Active   atorvastatin (Lipitor) 80 mg tablet [id number] Yes Take 1 tablet (80 mg) by mouth once daily at bedtime. Baron Schafer DO  Active   carvedilol (Coreg) 6.25 mg tablet [id number] Yes Take 1 tablet (6.25 mg) by mouth 2 times a day. Baron Schafer DO  Active   clopidogrel (Plavix) 75 mg tablet [id number] Yes Take 1 tablet (75 mg) by mouth once daily. Baron Schafer DO  Active   losartan (Cozaar) 25 mg tablet [id number] Yes Take 1 tablet (25 mg) by mouth once daily. Baron Schafer DO  Active   polyethylene glycol (Glycolax, Miralax) 17 gram/dose powder [id number]  Mix 17 g of powder and drink once daily.   Patient not taking: Reported on 2/5/2025    Roseanne Solis PA-C  Active   spironolactone (Aldactone) 25 mg tablet [id number] Yes Take 0.5 tablets (12.5 mg) by mouth once daily. Baron Schafer DO  Active   tamsulosin (Flomax) 0.4 mg 24 hr capsule [id number] Yes Take 1 capsule (0.4 mg) by mouth once daily. Historical Provider, MD  Active                                 Medication compliance: Yes   Uses pill box/organizer: No    Carries medication list: Yes MyChart    Blood Pressure Management  History of Hypertension: Yes   Medication Changes: Yes   Resting BP:  Visit Vitals  /82 (BP Location: Left arm, Patient Position: Sitting, BP Cuff Size: Large adult)   Pulse 56   Resp 14        Heart Failure Management  Hx of Heart Failure: No    Smoking/Tobacco Assessment  Social History     Tobacco Use   Smoking Status Some Days    Types: Cigars   Smokeless Tobacco Never   Tobacco Comments    1-2 cigar occasionally       Other Core Component Plan    Goal Status: Initial  Assessment; goals not yet started    Other Core Component Goals: Verbalize medication usage and drug actions by discharge, Begin tobacco cessation program, Set tobacco cessation quit date while enrolled, and Achieve resting BP of < 130/80 by discharge    Other Core Component Interventions/Education:   To be done in Cardiac Rehab    Initial Assessment: not yet started    Individual Patient Goals:    Establish aerobic exercise by discharge.  Be able to travel by discharge.    Goal Status: Initial Assessment; goals not yet started    Staff Comments:      Rehab Staff Signature: Chelita Cee RN

## 2025-02-06 ENCOUNTER — TELEPHONE (OUTPATIENT)
Dept: PRIMARY CARE | Facility: CLINIC | Age: 70
End: 2025-02-06

## 2025-02-06 DIAGNOSIS — I21.3 MYOCARDIAL NECROSIS SYNDROME (MULTI): Primary | ICD-10-CM

## 2025-02-07 ENCOUNTER — APPOINTMENT (OUTPATIENT)
Dept: CARDIAC REHAB | Facility: CLINIC | Age: 70
End: 2025-02-07
Payer: MEDICARE

## 2025-02-10 ENCOUNTER — CLINICAL SUPPORT (OUTPATIENT)
Dept: CARDIAC REHAB | Facility: CLINIC | Age: 70
End: 2025-02-10
Payer: MEDICARE

## 2025-02-10 DIAGNOSIS — I21.3 MYOCARDIAL NECROSIS SYNDROME (MULTI): ICD-10-CM

## 2025-02-10 PROCEDURE — 93798 PHYS/QHP OP CAR RHAB W/ECG: CPT | Performed by: INTERNAL MEDICINE

## 2025-02-10 NOTE — PROGRESS NOTES
Peña Arenas  1955  [id number]  69 y.o.    [facility] [id number]      Cardiac/Pulmonary Rehab Nutrition Education    2/10/2025  Reviewed PYP with pt. PYP score 49. Encouraged pt to increase intake of fruits and vegetables. He retired in January so his routine is upset and he is getting used to a new routine. Encouraged pt to set specific meal times to help keep him in a regular routine of eating. His wife is vegetarian, encouraged him to eat more plant based meals. He also has some friends who have been cooking meals for him and making adjustments to improve the cooking methods. Gave handouts on Heart Healthy Diet and label reading tips to begin reviewing as he had several questions regarding diet. Encouraged pt to review and follow up with questions as needed. Let pt know about the two nutrition lectures during rehab. If patient has additional questions, consider outpatient MNT with the dietitian.        Signature Mariluz Marcelo, ARTIE, LD

## 2025-02-12 ENCOUNTER — CLINICAL SUPPORT (OUTPATIENT)
Dept: CARDIAC REHAB | Facility: CLINIC | Age: 70
End: 2025-02-12
Payer: MEDICARE

## 2025-02-12 DIAGNOSIS — I21.3 MYOCARDIAL NECROSIS SYNDROME (MULTI): ICD-10-CM

## 2025-02-12 PROCEDURE — 93798 PHYS/QHP OP CAR RHAB W/ECG: CPT | Performed by: INTERNAL MEDICINE

## 2025-02-12 NOTE — PROGRESS NOTES
Cardiac Rehab Education  Peña Arenas   [id number]    2/12/2025  Resistance training education was done. Resistance bands were measured and cut for patient to begin home strength training exercises. Frequency, intensity/RPE, and form were reviewed with educational handout given for further reference. Patient was encouraged to comeback with any questions.      Signature Lexii Wynne RN

## 2025-02-14 ENCOUNTER — CLINICAL SUPPORT (OUTPATIENT)
Dept: CARDIAC REHAB | Facility: CLINIC | Age: 70
End: 2025-02-14
Payer: MEDICARE

## 2025-02-14 DIAGNOSIS — I21.3 MYOCARDIAL NECROSIS SYNDROME (MULTI): ICD-10-CM

## 2025-02-14 PROCEDURE — 93798 PHYS/QHP OP CAR RHAB W/ECG: CPT | Performed by: INTERNAL MEDICINE

## 2025-02-17 ENCOUNTER — CLINICAL SUPPORT (OUTPATIENT)
Dept: CARDIAC REHAB | Facility: CLINIC | Age: 70
End: 2025-02-17
Payer: MEDICARE

## 2025-02-17 DIAGNOSIS — I21.3 MYOCARDIAL NECROSIS SYNDROME (MULTI): ICD-10-CM

## 2025-02-17 PROCEDURE — 93798 PHYS/QHP OP CAR RHAB W/ECG: CPT | Performed by: INTERNAL MEDICINE

## 2025-02-19 ENCOUNTER — CLINICAL SUPPORT (OUTPATIENT)
Dept: CARDIAC REHAB | Facility: CLINIC | Age: 70
End: 2025-02-19
Payer: MEDICARE

## 2025-02-19 DIAGNOSIS — I21.3 MYOCARDIAL NECROSIS SYNDROME (MULTI): ICD-10-CM

## 2025-02-19 PROCEDURE — 93798 PHYS/QHP OP CAR RHAB W/ECG: CPT | Performed by: INTERNAL MEDICINE

## 2025-02-19 NOTE — PROGRESS NOTES
Cardiac Rehab Education  Peña Ospinaford   [id number]    2/19/2025  Cardiovascular changes with exercise were discussed in today's session. Both immediate and long term effects of exercise were covered and the importance of cooling down post workout was reviewed.      Dajuan Gary, [facility]-EP

## 2025-02-21 ENCOUNTER — CLINICAL SUPPORT (OUTPATIENT)
Dept: CARDIAC REHAB | Facility: CLINIC | Age: 70
End: 2025-02-21
Payer: MEDICARE

## 2025-02-21 DIAGNOSIS — I21.3 MYOCARDIAL NECROSIS SYNDROME (MULTI): ICD-10-CM

## 2025-02-21 PROCEDURE — 93798 PHYS/QHP OP CAR RHAB W/ECG: CPT | Performed by: INTERNAL MEDICINE

## 2025-02-24 ENCOUNTER — CLINICAL SUPPORT (OUTPATIENT)
Dept: CARDIAC REHAB | Facility: CLINIC | Age: 70
End: 2025-02-24
Payer: MEDICARE

## 2025-02-24 DIAGNOSIS — I21.3 MYOCARDIAL NECROSIS SYNDROME (MULTI): ICD-10-CM

## 2025-02-24 PROCEDURE — 93798 PHYS/QHP OP CAR RHAB W/ECG: CPT | Performed by: INTERNAL MEDICINE

## 2025-02-26 ENCOUNTER — CLINICAL SUPPORT (OUTPATIENT)
Dept: CARDIAC REHAB | Facility: CLINIC | Age: 70
End: 2025-02-26
Payer: MEDICARE

## 2025-02-26 DIAGNOSIS — I21.3 MYOCARDIAL NECROSIS SYNDROME (MULTI): ICD-10-CM

## 2025-02-26 PROCEDURE — 93798 PHYS/QHP OP CAR RHAB W/ECG: CPT | Performed by: INTERNAL MEDICINE

## 2025-02-26 NOTE — PROGRESS NOTES
Cardiac Rehab Education  Peña Ospinaford   [id number]    2/26/2025  Education on sodium intake and consequences of high blood pressure was done during today's session. Discussed with patient the risks of excess levels of sodium and how to decrease dietary intake with provided list of substitutions. Handouts were given for home reference.     Signature Marci Gary, [facility]-EP

## 2025-02-28 ENCOUNTER — CLINICAL SUPPORT (OUTPATIENT)
Dept: CARDIAC REHAB | Facility: CLINIC | Age: 70
End: 2025-02-28
Payer: MEDICARE

## 2025-02-28 DIAGNOSIS — I21.3 MYOCARDIAL NECROSIS SYNDROME (MULTI): ICD-10-CM

## 2025-02-28 PROCEDURE — 93798 PHYS/QHP OP CAR RHAB W/ECG: CPT | Performed by: INTERNAL MEDICINE

## 2025-03-03 ENCOUNTER — DOCUMENTATION (OUTPATIENT)
Dept: CARDIAC REHAB | Facility: CLINIC | Age: 70
End: 2025-03-03

## 2025-03-03 ENCOUNTER — CLINICAL SUPPORT (OUTPATIENT)
Dept: CARDIAC REHAB | Facility: CLINIC | Age: 70
End: 2025-03-03
Payer: MEDICARE

## 2025-03-03 DIAGNOSIS — I21.3 MYOCARDIAL NECROSIS SYNDROME (MULTI): ICD-10-CM

## 2025-03-03 PROCEDURE — 93798 PHYS/QHP OP CAR RHAB W/ECG: CPT | Performed by: INTERNAL MEDICINE

## 2025-03-03 NOTE — PROGRESS NOTES
Cardiac Rehabilitation 30 Day Reassessment    Name: Peña Arenas  Medical Record Number: [id number]  YOB: 1955  Age: 69 y.o.    Today’s Date: 3/3/2025  Primary Care Physician: Tiny Rodriguez MD  Referring Physician: Baron Schafer DO  Program Location: [address]  Primary Diagnosis:   1. STEMI (ST elevation myocardial infarction) (Multi)  Referral to Cardiac Rehab       2. ST elevation (STEMI) myocardial infarction involving left anterior descending coronary artery (Multi)  Referral to Cardiac Rehab       3. LV (left ventricular) mural thrombus with acute MI (Multi)  Referral to Cardiac Rehab        Onset/Date of Diagnosis: 12/23/2025    Session # 10    AACVPR Risk Stratification: High      Falls Risk: Low  Psychosocial Assessment     Pre PHQ-9: 1     Sent PH-Q 9 to MD if score > 20: No; score < 20    Pt reported/currently experiencing stress: No  Patient uses stress management skills: No   History of: no history of anxiety or depression  Currently seeing a mental health provider: No  Social Support: Yes, Whom:wife  Quality of Life Survey: SF-36   SF-36 Pre Post   Physical Component Score     Mental Component Score       Learning Assessment:  Learning assessment/barriers: Work  Preferred learning method: Visual  Barriers: None  Comments:    Stages of Change:Action    Psychosocial Plan    Goal Status: In progress    Psychosocial Goals: Maintain or lower PH-Q 9 score by discharge    Psychosocial Interventions/Education:   2/5/2025 Initial PH-Q 9 score reviewed.    Psychosocial Reassessment: in progress    Nutrition Assessment:    Hyperlipidemia: Yes     Lipids:   Lab Results   Component Value Date    CHOL 177 12/24/2024    HDL 39.4 12/24/2024    LDLF 183 (H) 03/14/2023    TRIG 91 12/24/2024         LDL                                                               119                                    12/24/2024    Current Dietary Guidelines: Low fat  Barriers to dietary  change: no    Diet Habit Survey: Picture Your Plate  Pre:  50  Post: To be done at discharge.    Diabetes Assessment    Lab Results   Component Value Date    HGBA1C 5.8 (H) 12/24/2024       History of Diabetes: No    Weight Management    Height: 182.9 cm (6')  Weight: 90.2 kg (198 lb 12.8 oz)  BMI (Calculated): 26.96       Nutrition Plan    Goal Status: In progress    Nutrition Goals: Lipid Goal: HDL>45, LDL <70, Total <180, Trigs <150, Improve Diet Habit Survey score by 5-10 points by discharge, Adapt a Mediterranean focused diet prior to discharge, and Learn how to read and interpret nutrition labels prior to discharge    Nutrition Interventions/Education:   2/10/2025  Reviewed PYP with pt. PYP score 49. Encouraged pt to increase intake of fruits and vegetables. He retired in January so his routine is upset and he is getting used to a new routine. Encouraged pt to set specific meal times to help keep him in a regular routine of eating. His wife is vegetarian, encouraged him to eat more plant based meals. He also has some friends who have been cooking meals for him and making adjustments to improve the cooking methods. Gave handouts on Heart Healthy Diet and label reading tips to begin reviewing as he had several questions regarding diet. Encouraged pt to review and follow up with questions as needed. Let pt know about the two nutrition lectures during rehab. If patient has additional questions, consider outpatient MNT with the dietitian.  Signature Mariluz Marcelo RDN, LD    Nutrition Reassessment: in progress    Exercise Assessment    Home Exercise: No  Mode: NA  Frequency: NA  Duration: NA    Exercise Prescription     Exercise Prescription based on: Duke Activity Status Index (DASI)    DASI Score: 16.2    MET Score: 4.7    Frequency:  3 days/week   Mode: Treadmill, NuStep, and Recumbent Cycle   Duration: 30 total aerobic minutes   Intensity: RPE 12-16  Target HR:  To be calculated after 6 attended  sessions.  MET Level: 3.7  Patient wears supplemental O2: No     Modality Workload METs Duration (minutes)   1 Pre-Exercise      2 Warm Up   2 :00   3 NuStep 72 Montano @ Load 46 2.9 20 :00   4 Recumbent Bike Load 5 @ 60 rpm  3.7 10 :00   5 Cooldown    5 :00   6 Post-Exercise        Resistance Training: No   Home Exercise Prescription given: To be given prior to discharge from program.    Exercise Plan    Goal Status: In progress    Exercise Goals: Increase total exercise duration to 30-45 minutes, Obtain 150 minutes/week of moderate intensity aerobic exercise, and Establish a home exercise program before discharge    Exercise Interventions/Education:   2/12/2025  Resistance training education was done. Resistance bands were measured and cut for patient to begin home strength training exercises. Frequency, intensity/RPE, and form were reviewed with educational handout given for further reference. Patient was encouraged to comeback with any questions.    2/19/2025  Cardiovascular changes with exercise were discussed in today's session. Both immediate and long term effects of exercise were covered and the importance of cooling down post workout was reviewed.      Exercise Reassessment: in progress    Other Core Components/Risk Factor Assessment:    Medication adherence  Current Medications:   Medication Documentation Review Audit       Reviewed by Chelita Cee RN (Registered Nurse) on 02/05/25 at 1408      Medication Order Taking? Sig Documenting Provider Last Dose Status   apixaban (Eliquis) 5 mg tablet [id number] Yes Take 1 tablet (5 mg) by mouth 2 times a day. Baron Schafer DO  Active   atorvastatin (Lipitor) 80 mg tablet [id number] Yes Take 1 tablet (80 mg) by mouth once daily at bedtime. Baron Schafer DO  Active   carvedilol (Coreg) 6.25 mg tablet [id number] Yes Take 1 tablet (6.25 mg) by mouth 2 times a day. Baron Schafer DO  Active   clopidogrel (Plavix) 75 mg tablet [id number] Yes Take 1 tablet (75  mg) by mouth once daily. Baron Schafer DO  Active   losartan (Cozaar) 25 mg tablet [id number] Yes Take 1 tablet (25 mg) by mouth once daily. Baron Schafer DO  Active   polyethylene glycol (Glycolax, Miralax) 17 gram/dose powder [id number]  Mix 17 g of powder and drink once daily.   Patient not taking: Reported on 2/5/2025    Roseanne Solis PA-C  Active   spironolactone (Aldactone) 25 mg tablet [id number] Yes Take 0.5 tablets (12.5 mg) by mouth once daily. Baron Schafer DO  Active   tamsulosin (Flomax) 0.4 mg 24 hr capsule [id number] Yes Take 1 capsule (0.4 mg) by mouth once daily. Historical Provider, MD  Active                                 Medication compliance: Yes   Uses pill box/organizer: No    Carries medication list: Yes MyChart    Blood Pressure Management  History of Hypertension: Yes   Medication Changes: Yes   Resting BP:  pre-ex. 134/84; post-ex. 116/50       Heart Failure Management  Hx of Heart Failure: No    Smoking/Tobacco Assessment  Social History     Tobacco Use   Smoking Status Some Days    Types: Cigars   Smokeless Tobacco Never   Tobacco Comments    1-2 cigar occasionally       Other Core Component Plan    Goal Status: In progress    Other Core Component Goals: Verbalize medication usage and drug actions by discharge, Begin tobacco cessation program, Set tobacco cessation quit date while enrolled, and Achieve resting BP of < 130/80 by discharge    Other Core Component Interventions/Education:   2/26/2025  Education on sodium intake and consequences of high blood pressure was done during today's session. Discussed with patient the risks of excess levels of sodium and how to decrease dietary intake with provided list of substitutions. Handouts were given for home reference.       Other Core Components Reassessment: in progress    Individual Patient Goals:    Establish aerobic exercise by discharge.  Be able to travel by discharge.    Goal Status: In progress    Staff Comments:    Dagoberto has started his Cardiac Rehab and has consistently been attending 10 sessions.  He is making progress on the NS and RB and tolerates increasing exercise intensity and duration well.  He feels awkward on the TM, therefore he does not go on it.  He has received education on diet, exercise and cardiovascular health.  He has been able to meet with the dietitian about his habits and received personalized nutrition recommendations.  He voiced no cardiac complaints and no ectopy was noted on the monitor.    Rehab Staff Signature: Chelita Cee RN

## 2025-03-05 ENCOUNTER — CLINICAL SUPPORT (OUTPATIENT)
Dept: CARDIAC REHAB | Facility: CLINIC | Age: 70
End: 2025-03-05
Payer: MEDICARE

## 2025-03-05 DIAGNOSIS — I21.3 MYOCARDIAL NECROSIS SYNDROME (MULTI): ICD-10-CM

## 2025-03-05 PROCEDURE — 93798 PHYS/QHP OP CAR RHAB W/ECG: CPT | Performed by: INTERNAL MEDICINE

## 2025-03-05 NOTE — PROGRESS NOTES
Peña Arenas  1955  [id number]  69 y.o.    [facility] [place] [id number]      Cardiac/Pulmonary Rehab Nutrition Education    3/5/2025  Patient attended Heart Healthy Diet lecture with program RDN during today's exercise session. Heart Healthy diet tips sheet was given for further review at home and patient was encouraged to come back with any follow up questions.    Signature Mariluz Marcelo RDN, LD

## 2025-03-07 ENCOUNTER — CLINICAL SUPPORT (OUTPATIENT)
Dept: CARDIAC REHAB | Facility: CLINIC | Age: 70
End: 2025-03-07
Payer: MEDICARE

## 2025-03-07 DIAGNOSIS — I21.3 MYOCARDIAL NECROSIS SYNDROME (MULTI): ICD-10-CM

## 2025-03-07 PROCEDURE — 93798 PHYS/QHP OP CAR RHAB W/ECG: CPT | Performed by: INTERNAL MEDICINE

## 2025-03-10 ENCOUNTER — CLINICAL SUPPORT (OUTPATIENT)
Dept: CARDIAC REHAB | Facility: CLINIC | Age: 70
End: 2025-03-10
Payer: MEDICARE

## 2025-03-10 DIAGNOSIS — I21.3 MYOCARDIAL NECROSIS SYNDROME (MULTI): ICD-10-CM

## 2025-03-10 PROCEDURE — 93798 PHYS/QHP OP CAR RHAB W/ECG: CPT | Performed by: INTERNAL MEDICINE

## 2025-03-12 ENCOUNTER — CLINICAL SUPPORT (OUTPATIENT)
Dept: CARDIAC REHAB | Facility: CLINIC | Age: 70
End: 2025-03-12
Payer: MEDICARE

## 2025-03-12 DIAGNOSIS — I21.3 MYOCARDIAL NECROSIS SYNDROME (MULTI): ICD-10-CM

## 2025-03-12 PROCEDURE — 93798 PHYS/QHP OP CAR RHAB W/ECG: CPT | Performed by: INTERNAL MEDICINE

## 2025-03-12 NOTE — PROGRESS NOTES
Cardiac Rehab Education  Peña Arenas   [id number]    3/12/2025  Exercise prescription and maintenance education was done during today's session. Discussed FITT principle, reviewed perceived exertion scale, and patient's THR was given for home practice with instructions on how to obtain. Handouts were given for personal reference. Home exercise prescription is to be given prior to discharge from program.      Dajuan Wynne RN

## 2025-03-14 ENCOUNTER — CLINICAL SUPPORT (OUTPATIENT)
Dept: CARDIAC REHAB | Facility: CLINIC | Age: 70
End: 2025-03-14
Payer: MEDICARE

## 2025-03-14 DIAGNOSIS — I21.3 MYOCARDIAL NECROSIS SYNDROME (MULTI): ICD-10-CM

## 2025-03-14 PROCEDURE — 93798 PHYS/QHP OP CAR RHAB W/ECG: CPT | Performed by: INTERNAL MEDICINE

## 2025-03-17 ENCOUNTER — CLINICAL SUPPORT (OUTPATIENT)
Dept: CARDIAC REHAB | Facility: CLINIC | Age: 70
End: 2025-03-17
Payer: MEDICARE

## 2025-03-17 DIAGNOSIS — I21.3 MYOCARDIAL NECROSIS SYNDROME (MULTI): ICD-10-CM

## 2025-03-17 PROCEDURE — 93798 PHYS/QHP OP CAR RHAB W/ECG: CPT | Performed by: INTERNAL MEDICINE

## 2025-03-19 ENCOUNTER — CLINICAL SUPPORT (OUTPATIENT)
Dept: CARDIAC REHAB | Facility: CLINIC | Age: 70
End: 2025-03-19
Payer: MEDICARE

## 2025-03-19 DIAGNOSIS — I21.3 MYOCARDIAL NECROSIS SYNDROME (MULTI): ICD-10-CM

## 2025-03-19 PROCEDURE — 93798 PHYS/QHP OP CAR RHAB W/ECG: CPT | Performed by: INTERNAL MEDICINE

## 2025-03-19 NOTE — PROGRESS NOTES
Cardiac Rehab Education  Peña Arenas   [id number]    3/19/2025  Coronary artery disease education was done during today's session. Signs and symptoms of possible heart attack, risk factors for prevention, and lifestyle behavior modifications were all explained. Handout for personal use was given.      Signature Marci Gary, [facility]-EP

## 2025-03-21 ENCOUNTER — CLINICAL SUPPORT (OUTPATIENT)
Dept: CARDIAC REHAB | Facility: CLINIC | Age: 70
End: 2025-03-21
Payer: MEDICARE

## 2025-03-21 DIAGNOSIS — I21.3 MYOCARDIAL NECROSIS SYNDROME (MULTI): ICD-10-CM

## 2025-03-21 PROCEDURE — 93798 PHYS/QHP OP CAR RHAB W/ECG: CPT | Performed by: INTERNAL MEDICINE

## 2025-03-23 ENCOUNTER — TELEPHONE (OUTPATIENT)
Dept: RHEUMATOLOGY | Facility: CLINIC | Age: 70
End: 2025-03-23

## 2025-03-24 ENCOUNTER — CLINICAL SUPPORT (OUTPATIENT)
Dept: CARDIAC REHAB | Facility: CLINIC | Age: 70
End: 2025-03-24
Payer: MEDICARE

## 2025-03-24 DIAGNOSIS — I21.3 MYOCARDIAL NECROSIS SYNDROME (MULTI): ICD-10-CM

## 2025-03-24 PROCEDURE — 93798 PHYS/QHP OP CAR RHAB W/ECG: CPT | Performed by: INTERNAL MEDICINE

## 2025-03-25 ENCOUNTER — PATIENT OUTREACH (OUTPATIENT)
Dept: PRIMARY CARE | Facility: CLINIC | Age: 70
End: 2025-03-25
Payer: MEDICARE

## 2025-03-26 ENCOUNTER — CLINICAL SUPPORT (OUTPATIENT)
Dept: CARDIAC REHAB | Facility: CLINIC | Age: 70
End: 2025-03-26
Payer: MEDICARE

## 2025-03-26 DIAGNOSIS — I21.3 MYOCARDIAL NECROSIS SYNDROME (MULTI): ICD-10-CM

## 2025-03-26 PROCEDURE — 93798 PHYS/QHP OP CAR RHAB W/ECG: CPT | Performed by: INTERNAL MEDICINE

## 2025-03-26 NOTE — PROGRESS NOTES
Cardiac Rehab Education  Peña Ospinaford   [id number]    3/26/2025  Effects of stress on the body, risks of unmanaged stress and techniques to minimize anxieties were all discussed in today's session. Handout was given for reference with resources to  Behavioral Health Services should patient need further assistance with stress management. Patient encouraged to notify staff if stressors increase and additional help is required.      Signature Marci Gary, [facility]

## 2025-03-28 ENCOUNTER — CLINICAL SUPPORT (OUTPATIENT)
Dept: CARDIAC REHAB | Facility: CLINIC | Age: 70
End: 2025-03-28
Payer: MEDICARE

## 2025-03-28 DIAGNOSIS — I21.3 MYOCARDIAL NECROSIS SYNDROME (MULTI): ICD-10-CM

## 2025-03-28 PROCEDURE — 93798 PHYS/QHP OP CAR RHAB W/ECG: CPT | Performed by: INTERNAL MEDICINE

## 2025-03-31 ENCOUNTER — DOCUMENTATION (OUTPATIENT)
Dept: CARDIAC REHAB | Facility: CLINIC | Age: 70
End: 2025-03-31

## 2025-03-31 ENCOUNTER — CLINICAL SUPPORT (OUTPATIENT)
Dept: CARDIAC REHAB | Facility: CLINIC | Age: 70
End: 2025-03-31
Payer: MEDICARE

## 2025-03-31 DIAGNOSIS — I21.3 MYOCARDIAL NECROSIS SYNDROME (MULTI): ICD-10-CM

## 2025-03-31 PROCEDURE — 93798 PHYS/QHP OP CAR RHAB W/ECG: CPT | Performed by: INTERNAL MEDICINE

## 2025-03-31 NOTE — PROGRESS NOTES
Cardiac Rehabilitation 60 Day Reassessment    Name: Peña Arenas  Medical Record Number: [id number]  YOB: 1955  Age: 70 y.o.    Today’s Date: 3/31/2025  Primary Care Physician: Tiny Rodriguez MD  Referring Physician: Baron Schafer DO  Program Location: [address]  Primary Diagnosis:   1. STEMI (ST elevation myocardial infarction) (Multi)  Referral to Cardiac Rehab       2. ST elevation (STEMI) myocardial infarction involving left anterior descending coronary artery (Multi)  Referral to Cardiac Rehab       3. LV (left ventricular) mural thrombus with acute MI (Multi)  Referral to Cardiac Rehab        Onset/Date of Diagnosis: 12/23/2025    Session # 22    AACVPR Risk Stratification: High      Falls Risk: Low  Psychosocial Assessment     Pre PHQ-9: 1     Sent PH-Q 9 to MD if score > 20: No; score < 20    Pt reported/currently experiencing stress: No  Patient uses stress management skills: No   History of: no history of anxiety or depression  Currently seeing a mental health provider: No  Social Support: Yes, Whom:wife  Quality of Life Survey: SF-36   SF-36 Pre Post   Physical Component Score     Mental Component Score       Learning Assessment:  Learning assessment/barriers: Work  Preferred learning method: Visual  Barriers: None  Comments:    Stages of Change:Action    Psychosocial Plan    Goal Status: In progress    Psychosocial Goals: Maintain or lower PH-Q 9 score by discharge    Psychosocial Interventions/Education:   2/5/2025 Initial PH-Q 9 score reviewed.  3/26/2025  Effects of stress on the body, risks of unmanaged stress and techniques to minimize anxieties were all discussed in today's session. Handout was given for reference with resources to  Behavioral Health Services should patient need further assistance with stress management. Patient encouraged to notify staff if stressors increase and additional help is required.        Psychosocial Reassessment: in  progress    Nutrition Assessment:    Hyperlipidemia: Yes     Lipids:   Lab Results   Component Value Date    CHOL 177 12/24/2024    HDL 39.4 12/24/2024    LDLF 183 (H) 03/14/2023    TRIG 91 12/24/2024         LDL                                                               119                                    12/24/2024    Current Dietary Guidelines: Low fat  Barriers to dietary change: no    Diet Habit Survey: Picture Your Plate  Pre:  50  Post: To be done at discharge.    Diabetes Assessment    Lab Results   Component Value Date    HGBA1C 5.8 (H) 12/24/2024       History of Diabetes: No    Weight Management    Height: 182.9 cm (6')  Weight: 90.2 kg (198 lb 12.8 oz)  BMI (Calculated): 26.96       Nutrition Plan    Goal Status: In progress    Nutrition Goals: Lipid Goal: HDL>45, LDL <70, Total <180, Trigs <150, Improve Diet Habit Survey score by 5-10 points by discharge, Adapt a Mediterranean focused diet prior to discharge, and Learn how to read and interpret nutrition labels prior to discharge    Nutrition Interventions/Education:   2/10/2025  Reviewed PYP with pt. PYP score 49. Encouraged pt to increase intake of fruits and vegetables. He retired in January so his routine is upset and he is getting used to a new routine. Encouraged pt to set specific meal times to help keep him in a regular routine of eating. His wife is vegetarian, encouraged him to eat more plant based meals. He also has some friends who have been cooking meals for him and making adjustments to improve the cooking methods. Gave handouts on Heart Healthy Diet and label reading tips to begin reviewing as he had several questions regarding diet. Encouraged pt to review and follow up with questions as needed. Let pt know about the two nutrition lectures during rehab. If patient has additional questions, consider outpatient MNT with the dietitian.  Signature Mariluz Marcelo RDN, MILENA  Cardiac/Pulmonary Rehab Nutrition Education    3/5/2025   Patient attended Heart Healthy Diet lecture with program RDN during today's exercise session. Heart Healthy diet tips sheet was given for further review at home and patient was encouraged to come back with any follow up questions.    Signature Mariluz Macrelo RDN, LD          Nutrition Reassessment: in progress    Exercise Assessment    Home Exercise: No  Mode: NA  Frequency: NA  Duration: NA    Exercise Prescription     Exercise Prescription based on: Duke Activity Status Index (DASI)    DASI Score: 16.2    MET Score: 4.7    Frequency:  3 days/week   Mode: Treadmill, NuStep, and Recumbent Cycle   Duration: 36 total aerobic minutes   Intensity: RPE 12-16  Target HR:   79-89  MET Level: 4.2  Patient wears supplemental O2: No     Modality Workload METs Duration (minutes)   1 Pre-Exercise      2 Treadmill 2.3 mph @ 2% 3.4 12 :00   3 NuStep 86 [address] @ Load 8 3.2 12 :00   4 Recumbent Bike Load 7 @ 60 rpm  4.2 12 :00   5 Cooldown    5 :00   6 Post-Exercise        Resistance Training: No   Home Exercise Prescription given: To be given prior to discharge from program.    Exercise Plan    Goal Status: In progress    Exercise Goals: Increase total exercise duration to 30-45 minutes, Obtain 150 minutes/week of moderate intensity aerobic exercise, and Establish a home exercise program before discharge    Exercise Interventions/Education:   2/12/2025  Resistance training education was done. Resistance bands were measured and cut for patient to begin home strength training exercises. Frequency, intensity/RPE, and form were reviewed with educational handout given for further reference. Patient was encouraged to comeback with any questions.    2/19/2025  Cardiovascular changes with exercise were discussed in today's session. Both immediate and long term effects of exercise were covered and the importance of cooling down post workout was reviewed.    3/12/2025  Exercise prescription and maintenance education was done during  today's session. Discussed FITT principle, reviewed perceived exertion scale, and patient's THR was given for home practice with instructions on how to obtain. Handouts were given for personal reference. Home exercise prescription is to be given prior to discharge from program.      Exercise Reassessment: in progress    Other Core Components/Risk Factor Assessment:    Medication adherence  Current Medications:   Medication Documentation Review Audit       Reviewed by Chelita Cee RN (Registered Nurse) on 02/05/25 at 1408      Medication Order Taking? Sig Documenting Provider Last Dose Status   apixaban (Eliquis) 5 mg tablet [id number] Yes Take 1 tablet (5 mg) by mouth 2 times a day. Baron Schafer DO  Active   atorvastatin (Lipitor) 80 mg tablet [id number] Yes Take 1 tablet (80 mg) by mouth once daily at bedtime. Baron Schafer DO  Active   carvedilol (Coreg) 6.25 mg tablet [id number] Yes Take 1 tablet (6.25 mg) by mouth 2 times a day. Baron Schafer DO  Active   clopidogrel (Plavix) 75 mg tablet [id number] Yes Take 1 tablet (75 mg) by mouth once daily. Baron Schafer DO  Active   losartan (Cozaar) 25 mg tablet [id number] Yes Take 1 tablet (25 mg) by mouth once daily. Baron Schafer DO  Active   polyethylene glycol (Glycolax, Miralax) 17 gram/dose powder [id number]  Mix 17 g of powder and drink once daily.   Patient not taking: Reported on 2/5/2025    Roseanne Solis PA-C  Active   spironolactone (Aldactone) 25 mg tablet [id number] Yes Take 0.5 tablets (12.5 mg) by mouth once daily. Baron Schafer DO  Active   tamsulosin (Flomax) 0.4 mg 24 hr capsule [id number] Yes Take 1 capsule (0.4 mg) by mouth once daily. Historical Provider, MD  Active                                 Medication compliance: Yes   Uses pill box/organizer: No    Carries medication list: Yes MyChart    Blood Pressure Management  History of Hypertension: Yes   Medication Changes: Yes   Resting BP:  pre-ex. 142/88; post-ex. 108/74        Heart Failure Management  Hx of Heart Failure: No    Smoking/Tobacco Assessment  Social History     Tobacco Use   Smoking Status Some Days    Types: Cigars   Smokeless Tobacco Never   Tobacco Comments    1-2 cigar occasionally       Other Core Component Plan    Goal Status: In progress    Other Core Component Goals: Verbalize medication usage and drug actions by discharge, Begin tobacco cessation program, Set tobacco cessation quit date while enrolled, and Achieve resting BP of < 130/80 by discharge    Other Core Component Interventions/Education:   2/26/2025  Education on sodium intake and consequences of high blood pressure was done during today's session. Discussed with patient the risks of excess levels of sodium and how to decrease dietary intake with provided list of substitutions. Handouts were given for home reference.   3/19/2025  Coronary artery disease education was done during today's session. Signs and symptoms of possible heart attack, risk factors for prevention, and lifestyle behavior modifications were all explained. Handout for personal use was given.          Other Core Components Reassessment: in progress    Individual Patient Goals:    Establish aerobic exercise by discharge.  Be able to travel by discharge.    Goal Status: In progress    Staff Comments:  Mr. Arenas has completed 22 sessions of Cardiac Rehab.  He has been attending consistently.   He is progressing on all modalities without any issues.  He continues to be educated on risk factor modification and healthy lifestyle choices.  He voiced no cardiac complaints and no ectopy was noted on the monitor.    Rehab Staff Signature: Chelita Cee RN

## 2025-04-02 ENCOUNTER — CLINICAL SUPPORT (OUTPATIENT)
Dept: CARDIAC REHAB | Facility: CLINIC | Age: 70
End: 2025-04-02
Payer: MEDICARE

## 2025-04-02 DIAGNOSIS — I21.3 MYOCARDIAL NECROSIS SYNDROME (MULTI): ICD-10-CM

## 2025-04-02 PROCEDURE — 93798 PHYS/QHP OP CAR RHAB W/ECG: CPT | Performed by: INTERNAL MEDICINE

## 2025-04-02 NOTE — PROGRESS NOTES
Peña Arenas  1955  [id number]  70 y.o.    [facility] [place] [id number]      Cardiac/Pulmonary Rehab Nutrition Education    4/2/2025  Label reading education was done with program's RDN during today's visit. Label Reading Highlights handout was given with information discussed during appointment and to assist in review of label contents at home.      Signature Mariluz Marcelo RDN, LD

## 2025-04-04 ENCOUNTER — CLINICAL SUPPORT (OUTPATIENT)
Dept: CARDIAC REHAB | Facility: CLINIC | Age: 70
End: 2025-04-04
Payer: MEDICARE

## 2025-04-04 DIAGNOSIS — I21.3 MYOCARDIAL NECROSIS SYNDROME (MULTI): ICD-10-CM

## 2025-04-04 PROCEDURE — 93798 PHYS/QHP OP CAR RHAB W/ECG: CPT | Performed by: INTERNAL MEDICINE

## 2025-04-07 ENCOUNTER — CLINICAL SUPPORT (OUTPATIENT)
Dept: CARDIAC REHAB | Facility: CLINIC | Age: 70
End: 2025-04-07
Payer: MEDICARE

## 2025-04-07 DIAGNOSIS — I21.3 MYOCARDIAL NECROSIS SYNDROME (MULTI): ICD-10-CM

## 2025-04-07 PROCEDURE — 93798 PHYS/QHP OP CAR RHAB W/ECG: CPT | Performed by: INTERNAL MEDICINE

## 2025-04-09 ENCOUNTER — CLINICAL SUPPORT (OUTPATIENT)
Dept: CARDIAC REHAB | Facility: CLINIC | Age: 70
End: 2025-04-09
Payer: MEDICARE

## 2025-04-09 DIAGNOSIS — I21.3 MYOCARDIAL NECROSIS SYNDROME (MULTI): ICD-10-CM

## 2025-04-09 PROCEDURE — 93798 PHYS/QHP OP CAR RHAB W/ECG: CPT | Performed by: INTERNAL MEDICINE

## 2025-04-09 NOTE — PROGRESS NOTES
Cardiac Rehab Education  Peña [place]spinaford   [id number]    4/9/2025  Discussion of prescribed drug names, doses, side effects, and medication uses was done with patient. Education handout, Medications Used for Cardiac Conditions was also given to the patient. Patient was instructed to come back with any questions.      Signature Lexii Wynne RN

## 2025-04-11 ENCOUNTER — CLINICAL SUPPORT (OUTPATIENT)
Dept: CARDIAC REHAB | Facility: CLINIC | Age: 70
End: 2025-04-11
Payer: MEDICARE

## 2025-04-11 DIAGNOSIS — I21.3 MYOCARDIAL NECROSIS SYNDROME (MULTI): ICD-10-CM

## 2025-04-11 PROCEDURE — 93798 PHYS/QHP OP CAR RHAB W/ECG: CPT | Performed by: INTERNAL MEDICINE

## 2025-04-14 ENCOUNTER — CLINICAL SUPPORT (OUTPATIENT)
Dept: CARDIAC REHAB | Facility: CLINIC | Age: 70
End: 2025-04-14
Payer: MEDICARE

## 2025-04-14 DIAGNOSIS — I21.3 MYOCARDIAL NECROSIS SYNDROME (MULTI): ICD-10-CM

## 2025-04-14 PROCEDURE — 93798 PHYS/QHP OP CAR RHAB W/ECG: CPT | Performed by: INTERNAL MEDICINE

## 2025-04-16 ENCOUNTER — CLINICAL SUPPORT (OUTPATIENT)
Dept: CARDIAC REHAB | Facility: CLINIC | Age: 70
End: 2025-04-16
Payer: MEDICARE

## 2025-04-16 DIAGNOSIS — I21.3 MYOCARDIAL NECROSIS SYNDROME (MULTI): ICD-10-CM

## 2025-04-16 PROCEDURE — 93798 PHYS/QHP OP CAR RHAB W/ECG: CPT | Performed by: INTERNAL MEDICINE

## 2025-04-17 ENCOUNTER — HOSPITAL ENCOUNTER (OUTPATIENT)
Dept: RADIOLOGY | Facility: HOSPITAL | Age: 70
Discharge: HOME | End: 2025-04-17
Payer: MEDICARE

## 2025-04-17 DIAGNOSIS — I21.29: ICD-10-CM

## 2025-04-17 DIAGNOSIS — I21.02 ST ELEVATION (STEMI) MYOCARDIAL INFARCTION INVOLVING LEFT ANTERIOR DESCENDING CORONARY ARTERY (MULTI): ICD-10-CM

## 2025-04-17 DIAGNOSIS — I21.3 STEMI (ST ELEVATION MYOCARDIAL INFARCTION) (MULTI): ICD-10-CM

## 2025-04-17 PROCEDURE — 2550000001 HC RX 255 CONTRASTS: Mod: JZ | Performed by: INTERNAL MEDICINE

## 2025-04-17 PROCEDURE — 75561 CARDIAC MRI FOR MORPH W/DYE: CPT

## 2025-04-17 PROCEDURE — A9575 INJ GADOTERATE MEGLUMI 0.1ML: HCPCS | Mod: JZ | Performed by: INTERNAL MEDICINE

## 2025-04-17 RX ORDER — GADOTERATE MEGLUMINE 376.9 MG/ML
30 INJECTION INTRAVENOUS
Status: COMPLETED | OUTPATIENT
Start: 2025-04-17 | End: 2025-04-17

## 2025-04-17 RX ADMIN — GADOTERATE MEGLUMINE 30 ML: 376.9 INJECTION INTRAVENOUS at 10:57

## 2025-04-18 ENCOUNTER — CLINICAL SUPPORT (OUTPATIENT)
Dept: CARDIAC REHAB | Facility: CLINIC | Age: 70
End: 2025-04-18
Payer: MEDICARE

## 2025-04-18 DIAGNOSIS — I21.3 MYOCARDIAL NECROSIS SYNDROME (MULTI): ICD-10-CM

## 2025-04-18 PROCEDURE — 93798 PHYS/QHP OP CAR RHAB W/ECG: CPT | Performed by: INTERNAL MEDICINE

## 2025-04-21 ENCOUNTER — CLINICAL SUPPORT (OUTPATIENT)
Dept: CARDIAC REHAB | Facility: CLINIC | Age: 70
End: 2025-04-21
Payer: MEDICARE

## 2025-04-21 DIAGNOSIS — I21.3 MYOCARDIAL NECROSIS SYNDROME (MULTI): ICD-10-CM

## 2025-04-21 PROCEDURE — 93798 PHYS/QHP OP CAR RHAB W/ECG: CPT | Performed by: INTERNAL MEDICINE

## 2025-04-23 ENCOUNTER — CLINICAL SUPPORT (OUTPATIENT)
Dept: CARDIAC REHAB | Facility: CLINIC | Age: 70
End: 2025-04-23
Payer: MEDICARE

## 2025-04-23 DIAGNOSIS — I21.3 MYOCARDIAL NECROSIS SYNDROME (MULTI): ICD-10-CM

## 2025-04-23 PROCEDURE — 93798 PHYS/QHP OP CAR RHAB W/ECG: CPT | Performed by: INTERNAL MEDICINE

## 2025-04-23 NOTE — PROGRESS NOTES
Cardiac Rehab Education  Peña Arenas   [id number]    4/23/2025  Stretching education was done with patient during today's rehab session. Demonstrations of seated and standing stretches were done with patient, in addition to discussing the benefits of flexibility training. Patient was provided handout with examples of exercises and further instructions to be practiced at home. Patient encouraged to report to rehab staff with any questions or concerns.      Daujan Wynne RN

## 2025-04-25 ENCOUNTER — CLINICAL SUPPORT (OUTPATIENT)
Dept: CARDIAC REHAB | Facility: CLINIC | Age: 70
End: 2025-04-25
Payer: MEDICARE

## 2025-04-25 DIAGNOSIS — I21.3 MYOCARDIAL NECROSIS SYNDROME (MULTI): ICD-10-CM

## 2025-04-25 PROCEDURE — 93798 PHYS/QHP OP CAR RHAB W/ECG: CPT | Performed by: INTERNAL MEDICINE

## 2025-04-28 ENCOUNTER — CLINICAL SUPPORT (OUTPATIENT)
Dept: CARDIAC REHAB | Facility: CLINIC | Age: 70
End: 2025-04-28
Payer: MEDICARE

## 2025-04-28 ENCOUNTER — DOCUMENTATION (OUTPATIENT)
Dept: CARDIAC REHAB | Facility: CLINIC | Age: 70
End: 2025-04-28

## 2025-04-28 DIAGNOSIS — I21.3 MYOCARDIAL NECROSIS SYNDROME (MULTI): ICD-10-CM

## 2025-04-28 PROCEDURE — 93798 PHYS/QHP OP CAR RHAB W/ECG: CPT | Performed by: INTERNAL MEDICINE

## 2025-04-28 NOTE — PROGRESS NOTES
Cardiac Rehabilitation 90 Day Reassessment    Name: Peña Arenas  Medical Record Number: [id number]  YOB: 1955  Age: 70 y.o.    Today’s Date: 4/28/2025  Primary Care Physician: Tiny Rodriguez MD  Referring Physician: Baron Schafer DO  Program Location: [address]  Primary Diagnosis:   1. STEMI (ST elevation myocardial infarction) (Multi)  Referral to Cardiac Rehab       2. ST elevation (STEMI) myocardial infarction involving left anterior descending coronary artery (Multi)  Referral to Cardiac Rehab       3. LV (left ventricular) mural thrombus with acute MI (Multi)  Referral to Cardiac Rehab        Onset/Date of Diagnosis: 12/23/2025    Session # 34    AACVPR Risk Stratification: High      Falls Risk: Low  Psychosocial Assessment     Pre PHQ-9: 1     Sent PH-Q 9 to MD if score > 20: No; score < 20    Pt reported/currently experiencing stress: No  Patient uses stress management skills: No   History of: no history of anxiety or depression  Currently seeing a mental health provider: No  Social Support: Yes, Whom:wife  Quality of Life Survey: SF-36   SF-36 Pre Post   Physical Component Score     Mental Component Score       Learning Assessment:  Learning assessment/barriers: Work  Preferred learning method: Visual  Barriers: None  Comments:    Stages of Change:Action    Psychosocial Plan    Goal Status: In progress    Psychosocial Goals: Maintain or lower PH-Q 9 score by discharge    Psychosocial Interventions/Education:   2/5/2025 Initial PH-Q 9 score reviewed.  3/26/2025  Effects of stress on the body, risks of unmanaged stress and techniques to minimize anxieties were all discussed in today's session. Handout was given for reference with resources to  Behavioral Health Services should patient need further assistance with stress management. Patient encouraged to notify staff if stressors increase and additional help is required.    4/28/2025 No change in psychosocial  status reported by pt.    Psychosocial Reassessment: in progress    Nutrition Assessment:    Hyperlipidemia: Yes     Lipids:   Lab Results   Component Value Date    CHOL 177 12/24/2024    HDL 39.4 12/24/2024    LDLF 119 (H) 12/24/2024    TRIG 91 12/24/2024             Current Dietary Guidelines: Low fat  Barriers to dietary change: no    Diet Habit Survey: Picture Your Plate  Pre:  50  Post: To be done at discharge.    Diabetes Assessment    Lab Results   Component Value Date    HGBA1C 5.8 (H) 12/24/2024       History of Diabetes: No    Weight Management    Height: 182.9 cm (6')  Weight: 90.2 kg (198 lb 12.8 oz)  BMI (Calculated): 26.96       Nutrition Plan    Goal Status: In progress    Nutrition Goals: Lipid Goal: HDL>45, LDL <70, Total <180, Trigs <150, Improve Diet Habit Survey score by 5-10 points by discharge, Adapt a Mediterranean focused diet prior to discharge, and Learn how to read and interpret nutrition labels prior to discharge    Nutrition Interventions/Education:   2/10/2025  Reviewed PYP with pt. PYP score 49. Encouraged pt to increase intake of fruits and vegetables. He retired in January so his routine is upset and he is getting used to a new routine. Encouraged pt to set specific meal times to help keep him in a regular routine of eating. His wife is vegetarian, encouraged him to eat more plant based meals. He also has some friends who have been cooking meals for him and making adjustments to improve the cooking methods. Gave handouts on Heart Healthy Diet and label reading tips to begin reviewing as he had several questions regarding diet. Encouraged pt to review and follow up with questions as needed. Let pt know about the two nutrition lectures during rehab. If patient has additional questions, consider outpatient MNT with the dietitian.  Signature Mariluz Marcelo RDN, LD    3/3/2025 Cardiac booklet given and lipids reviewed.    Cardiac/Pulmonary Rehab Nutrition Education    3/5/2025  Patient  attended Heart Healthy Diet lecture with program RDN during today's exercise session. Heart Healthy diet tips sheet was given for further review at home and patient was encouraged to come back with any follow up questions.    Dajuan Marcelo RDN, LD    Cardiac/Pulmonary Rehab Nutrition Education    4/2/2025  Label reading education was done with program's RDN during today's visit. Label Reading Highlights handout was given with information discussed during appointment and to assist in review of label contents at home.      Dajuan Marcelo RDN, LD      Nutrition Reassessment: in progress    Exercise Assessment    Home Exercise: No  Mode: NA  Frequency: NA  Duration: NA    Exercise Prescription     Exercise Prescription based on: Duke Activity Status Index (DASI)    DASI Score: 16.2    MET Score: 4.7    Frequency:  3 days/week   Mode: Treadmill, NuStep, and Recumbent Cycle   Duration: 45 total aerobic minutes   Intensity: RPE 12-16  Target HR:   79-89  MET Level: 4.8  Patient wears supplemental O2: No     Modality Workload METs Duration (minutes)   1 Pre-Exercise      2 Treadmill 2.6 mph @ 5% 4.8 15 :00   3 NuStep 94 [address] @ Load 9 3.3 15 :00   4 Recumbent Bike Load 8 @ 60 rpm  4.8 15 :00   5 Cooldown    5 :00   6 Post-Exercise        Resistance Training: No   Home Exercise Prescription given: To be given prior to discharge from program.    Exercise Plan    Goal Status: In progress    Exercise Goals: Increase total exercise duration to 30-45 minutes, Obtain 150 minutes/week of moderate intensity aerobic exercise, and Establish a home exercise program before discharge    Exercise Interventions/Education:   2/12/2025  Resistance training education was done. Resistance bands were measured and cut for patient to begin home strength training exercises. Frequency, intensity/RPE, and form were reviewed with educational handout given for further reference. Patient was encouraged to comeback with any  questions.    2/19/2025  Cardiovascular changes with exercise were discussed in today's session. Both immediate and long term effects of exercise were covered and the importance of cooling down post workout was reviewed.    3/12/2025  Exercise prescription and maintenance education was done during today's session. Discussed FITT principle, reviewed perceived exertion scale, and patient's THR was given for home practice with instructions on how to obtain. Handouts were given for personal reference. Home exercise prescription is to be given prior to discharge from program.    4/23/2025  Stretching education was done with patient during today's rehab session. Demonstrations of seated and standing stretches were done with patient, in addition to discussing the benefits of flexibility training. Patient was provided handout with examples of exercises and further instructions to be practiced at home. Patient encouraged to report to rehab staff with any questions or concerns.        Exercise Reassessment: in progress    Other Core Components/Risk Factor Assessment:    Medication adherence  Current Medications:   Medication Documentation Review Audit       Reviewed by Chelita Cee RN (Registered Nurse) on 02/05/25 at 1408      Medication Order Taking? Sig Documenting Provider Last Dose Status   apixaban (Eliquis) 5 mg tablet [id number] Yes Take 1 tablet (5 mg) by mouth 2 times a day. Baron Schafer DO  Active   atorvastatin (Lipitor) 80 mg tablet [id number] Yes Take 1 tablet (80 mg) by mouth once daily at bedtime. Baron Schafer DO  Active   carvedilol (Coreg) 6.25 mg tablet [id number] Yes Take 1 tablet (6.25 mg) by mouth 2 times a day. Baron Schafer DO  Active   clopidogrel (Plavix) 75 mg tablet [id number] Yes Take 1 tablet (75 mg) by mouth once daily. Baron Schafer DO  Active   losartan (Cozaar) 25 mg tablet [id number] Yes Take 1 tablet (25 mg) by mouth once daily. Baron Schafer DO  Active   polyethylene  glycol (Glycolax, Miralax) 17 gram/dose powder [id number]  Mix 17 g of powder and drink once daily.   Patient not taking: Reported on 2/5/2025    Roseanne Solis PA-C  Active   spironolactone (Aldactone) 25 mg tablet [id number] Yes Take 0.5 tablets (12.5 mg) by mouth once daily. Baron Schafer DO  Active   tamsulosin (Flomax) 0.4 mg 24 hr capsule [id number] Yes Take 1 capsule (0.4 mg) by mouth once daily. Historical Provider, MD  Active                                 Medication compliance: Yes   Uses pill box/organizer: No    Carries medication list: Yes MyChart    Blood Pressure Management  History of Hypertension: Yes   Medication Changes: Yes   Resting BP:  pre-ex. 138/88; post-ex. 114/74       Heart Failure Management  Hx of Heart Failure: No    Smoking/Tobacco Assessment  Social History     Tobacco Use   Smoking Status Some Days    Types: Cigars   Smokeless Tobacco Never   Tobacco Comments    1-2 cigar occasionally       Other Core Component Plan    Goal Status: In progress    Other Core Component Goals: Verbalize medication usage and drug actions by discharge, Begin tobacco cessation program, Set tobacco cessation quit date while enrolled, and Achieve resting BP of < 130/80 by discharge    Other Core Component Interventions/Education:   2/26/2025  Education on sodium intake and consequences of high blood pressure was done during today's session. Discussed with patient the risks of excess levels of sodium and how to decrease dietary intake with provided list of substitutions. Handouts were given for home reference.   3/19/2025  Coronary artery disease education was done during today's session. Signs and symptoms of possible heart attack, risk factors for prevention, and lifestyle behavior modifications were all explained. Handout for personal use was given.    4/9/2025  Discussion of prescribed drug names, doses, side effects, and medication uses was done with patient. Education handout, Medications Used for  Cardiac Conditions was also given to the patient. Patient was instructed to come back with any questions.    4/16/2025  Risk factors of cardiovascular disease were discussed today's. Education on modifiable and non-modifiable risk factors of CVD and recommendations to manage them was done today. Handouts were provided along with further reference. Patient was educated to comeback with further questions.       Other Core Components Reassessment: in progress    Individual Patient Goals:    Establish aerobic exercise by discharge.  Be able to travel by discharge.    Goal Status: In progress    Staff Comments:  Mr. Arenas has completed 34 sessions of Cardiac Rehab.  He is close to graduating from the program.  He continues to make progress on all modalities without any issues.  He has received some education on label reading, stretching, medications and risk factors of cardiovascular disease.  He voiced no cardiac complaints and no ectopy was noted on the monitor.    Rehab Staff Signature: Chelita Cee RN

## 2025-04-30 ENCOUNTER — CLINICAL SUPPORT (OUTPATIENT)
Dept: CARDIAC REHAB | Facility: CLINIC | Age: 70
End: 2025-04-30
Payer: MEDICARE

## 2025-04-30 DIAGNOSIS — I21.3 MYOCARDIAL NECROSIS SYNDROME (MULTI): ICD-10-CM

## 2025-04-30 PROCEDURE — 93798 PHYS/QHP OP CAR RHAB W/ECG: CPT | Performed by: INTERNAL MEDICINE

## 2025-04-30 NOTE — PROGRESS NOTES
Cardiac Rehab Education  Peña Ospinaford   [id number]    4/30/2025  Cardiovascular changes with exercise were discussed in today's session. Both immediate and long term effects of exercise were covered and the importance of cooling down post workout was reviewed.     Dajuan Wynne RN

## 2025-04-30 NOTE — PROGRESS NOTES
Phase II Cardiac Rehabilitation Performance Measure Report    Patient Name: Peña Arenas   : 1955   MRN: [id number]   : Chelita Cee RN  Start Date: 2/10/25  End Date: 2025  # Sessions Completed: 36     Patient's Stated Goals:   Establish aerobic exercise by discharge.   Achieved: In Progress    Be able to travel by discharge.   Achieved: In Progress  Comments: Patient plans to continue his exercise routine at a local gym.    Program Outcome Goals:    Tobacco Cessation  Social History[1]   Achieved: In Progress  Comments/Long-Term Goals:    Lipids  Lab Results   Component Value Date    CHOL 177 2024    CHOL 250 (H) 2023    HDL 39.4 2024    HDL 46.7 2023    LDLCALC 119 (H) 2024    TRIG 91 2024    TRIG 103 2023     Achieved: In Progress  Comments/Long-Term Goals: Patient was advised to read labels for the grocery and food items as suggested by program dietician, continue to take your statin as prescribed by your provider, and exercise.      Physical Activity  Duration   Pre: 24 minutes   Post: 45 minutes   % Change: 88%  METS (Intensity)   Pre: 2.4   Post: 4.8   % Change: 100%    Achieved: Yes  Comments/Long-Term Goals: Good job!! Patient is advised to continue achieving 150 minutes/week or more of aerobic exercise and to progress the exercise intensity as well as duration according to the exercise handout.      Diabetes     Lab Results   Component Value Date    HGBA1C 5.8 (H) 2024    HGBA1C 5.8 (A) 2023      Achieved: Not Applicable  Comments/Long-Term Goals:     Nutrition:   Picture Your Plate  Pre: 50  Post: 65    Achieved: Yes  Comments/Long-Term Goals: Patient was advised to continue progressing towards heart healthy diet as program dietician.     Psychosocial:   PHQ-9  Pre: 1  Post: 0    Achieved: Yes  Comments/Long-Term Goals: Patient was advised to continue managing stress and using stress management skills.     Rehab Staff  Signature: Chelita Cee RN  Date: 4/30/2025          [1]   Social History  Tobacco Use    Smoking status: Some Days     Types: Cigars    Smokeless tobacco: Never    Tobacco comments:     1-2 cigar occasionally   Vaping Use    Vaping status: Never Used   Substance Use Topics    Alcohol use: Not Currently    Drug use: Not Currently     Types: Marijuana

## 2025-05-01 ENCOUNTER — APPOINTMENT (OUTPATIENT)
Dept: CARDIOLOGY | Facility: CLINIC | Age: 70
End: 2025-05-01
Payer: MEDICARE

## 2025-05-01 VITALS
WEIGHT: 194 LBS | OXYGEN SATURATION: 100 % | BODY MASS INDEX: 25.71 KG/M2 | DIASTOLIC BLOOD PRESSURE: 96 MMHG | SYSTOLIC BLOOD PRESSURE: 133 MMHG | HEART RATE: 56 BPM | HEIGHT: 73 IN

## 2025-05-01 DIAGNOSIS — I21.3 ST ELEVATION MYOCARDIAL INFARCTION (STEMI), UNSPECIFIED ARTERY (MULTI): Chronic | ICD-10-CM

## 2025-05-01 DIAGNOSIS — I10 BENIGN ESSENTIAL HYPERTENSION: Primary | ICD-10-CM

## 2025-05-01 DIAGNOSIS — I21.02 ST ELEVATION (STEMI) MYOCARDIAL INFARCTION INVOLVING LEFT ANTERIOR DESCENDING CORONARY ARTERY (MULTI): ICD-10-CM

## 2025-05-01 DIAGNOSIS — N40.1 BENIGN PROSTATIC HYPERPLASIA WITH LOWER URINARY TRACT SYMPTOMS, SYMPTOM DETAILS UNSPECIFIED: ICD-10-CM

## 2025-05-01 DIAGNOSIS — R31.0 GROSS HEMATURIA: ICD-10-CM

## 2025-05-01 DIAGNOSIS — E78.01 FAMILIAL HYPERCHOLESTEROLEMIA: ICD-10-CM

## 2025-05-01 DIAGNOSIS — I21.29: ICD-10-CM

## 2025-05-01 DIAGNOSIS — E78.5 DYSLIPIDEMIA: ICD-10-CM

## 2025-05-01 DIAGNOSIS — I10 HYPERTENSION, UNCONTROLLED: ICD-10-CM

## 2025-05-01 DIAGNOSIS — Z95.5 STATUS POST INSERTION OF DRUG ELUTING CORONARY ARTERY STENT: ICD-10-CM

## 2025-05-01 DIAGNOSIS — I21.3 STEMI (ST ELEVATION MYOCARDIAL INFARCTION) (MULTI): ICD-10-CM

## 2025-05-01 LAB
ATRIAL RATE: 56 BPM
P AXIS: 54 DEGREES
P OFFSET: 179 MS
P ONSET: 118 MS
PR INTERVAL: 186 MS
Q ONSET: 211 MS
QRS COUNT: 10 BEATS
QRS DURATION: 134 MS
QT INTERVAL: 478 MS
QTC CALCULATION(BAZETT): 461 MS
QTC FREDERICIA: 467 MS
R AXIS: 23 DEGREES
T AXIS: 82 DEGREES
T OFFSET: 450 MS
VENTRICULAR RATE: 56 BPM

## 2025-05-01 PROCEDURE — 3075F SYST BP GE 130 - 139MM HG: CPT | Performed by: INTERNAL MEDICINE

## 2025-05-01 PROCEDURE — 4004F PT TOBACCO SCREEN RCVD TLK: CPT | Performed by: INTERNAL MEDICINE

## 2025-05-01 PROCEDURE — 99215 OFFICE O/P EST HI 40 MIN: CPT | Performed by: INTERNAL MEDICINE

## 2025-05-01 PROCEDURE — 3080F DIAST BP >= 90 MM HG: CPT | Performed by: INTERNAL MEDICINE

## 2025-05-01 PROCEDURE — G2211 COMPLEX E/M VISIT ADD ON: HCPCS | Performed by: INTERNAL MEDICINE

## 2025-05-01 PROCEDURE — 3008F BODY MASS INDEX DOCD: CPT | Performed by: INTERNAL MEDICINE

## 2025-05-01 PROCEDURE — 1123F ACP DISCUSS/DSCN MKR DOCD: CPT | Performed by: INTERNAL MEDICINE

## 2025-05-01 PROCEDURE — 1126F AMNT PAIN NOTED NONE PRSNT: CPT | Performed by: INTERNAL MEDICINE

## 2025-05-01 PROCEDURE — 1160F RVW MEDS BY RX/DR IN RCRD: CPT | Performed by: INTERNAL MEDICINE

## 2025-05-01 PROCEDURE — 1159F MED LIST DOCD IN RCRD: CPT | Performed by: INTERNAL MEDICINE

## 2025-05-01 PROCEDURE — 93005 ELECTROCARDIOGRAM TRACING: CPT | Performed by: INTERNAL MEDICINE

## 2025-05-01 RX ORDER — CLOPIDOGREL BISULFATE 75 MG/1
75 TABLET ORAL DAILY
Qty: 30 TABLET | Refills: 11 | Status: SHIPPED | OUTPATIENT
Start: 2025-05-01 | End: 2026-05-01

## 2025-05-01 RX ORDER — SPIRONOLACTONE 25 MG/1
12.5 TABLET ORAL DAILY
Qty: 45 TABLET | Refills: 3 | Status: SHIPPED | OUTPATIENT
Start: 2025-05-01 | End: 2026-05-01

## 2025-05-01 RX ORDER — LOSARTAN POTASSIUM 25 MG/1
25 TABLET ORAL DAILY
Qty: 90 TABLET | Refills: 3 | Status: SHIPPED | OUTPATIENT
Start: 2025-05-01 | End: 2026-05-01

## 2025-05-01 RX ORDER — CARVEDILOL 6.25 MG/1
6.25 TABLET ORAL 2 TIMES DAILY
Qty: 180 TABLET | Refills: 3 | Status: SHIPPED | OUTPATIENT
Start: 2025-05-01 | End: 2026-05-01

## 2025-05-01 RX ORDER — ATORVASTATIN CALCIUM 80 MG/1
80 TABLET, FILM COATED ORAL NIGHTLY
Qty: 90 TABLET | Refills: 3 | Status: SHIPPED | OUTPATIENT
Start: 2025-05-01 | End: 2026-05-01

## 2025-05-01 ASSESSMENT — PAIN SCALES - GENERAL: PAINLEVEL_OUTOF10: 0-NO PAIN

## 2025-05-01 NOTE — PATIENT INSTRUCTIONS
It was a pleasure seeing you today. I would like to see you back in clinic in  late April.  You can call my office if questions arise between now and our next visit.     Today, we talked about your heart attack     You need to remain on your medications. YOU NEED TO MAKE SURE THAT YOU HAVE THESE MEDICATIONS EACH AND EVERY DAY   ++ Apixaban ( Eliquis ) 5 mg Twice Daily  --> blood thinner to reduce blood clot formation within the heart    ++ Clopidogrel ( Plavix) 75 mg daily --> Anti-platelet that helps prevent platelets from sticking and creating a smaller blood clot.     ++ Atorvastatin ( lipitor) 80 mg taken once daily is designed to help lower cholesterol and stabilize plaque    ++ Carvedilol ( Coreg ) 6.25 mg taken twice daily as a beta-blocker designed to help lower blood pressure and heart rates to help your heart heal    ++ Losartan ( Cozaar) 25 mg daily is a blood pressure medication that also helps the heart to heal.      ++  Spironolactone ( Aldactone) is a blood pressure medication that also helps your heart to heal.  This helps to prevent remodeling changes in the anterior walls.  You will remain on 12.5 mg once daily.    CURRENTLY YOU DO NOT NEED BABY ( 81) mg Aspirin as you are taking the Clopidogrel and the Apixaban. The risk of bleeding is significantly increased if you take all three medications        Please stop smoking.    --Try to cut back on the number of cigarettes that you smoke.    -- Try to increase the interval between cigarettes.   -- Try to use substitutes such as sugar-free candy carrots,celery sticks as in between.  --  Once you are down to less than half a pack a day try to go cold turkey.   -- Try to designate areas in the your home as smoke-free areas.   -- Try to reduce the number of ashtrays and other reminders of smoking.   -- Try to keep any major something that you dislike next to your cigarette pack to try to develop a mental aversion to smoking      Below are some Heart Health  "Tips that we provide to all of our patients. I hope you fing them useful.     - If you are having problems with medications, consider looking at the following websites.   --  \"GoodRx\"   --  \"[facility] [facility]\"      - We are happy to supply written prescriptions if needed to allow you to obtain your medications from different pharmacies. Additionally, if you are having issues with mail order delivery, please let us know. We can send a limited supply of your medications to your local pharmacy.     -  We recommend you follow a heart healthy diet. Watch food labels and try not to eat more than 2,500 mg of sodium per day. Avoid foods high in salt like processed meats (lunch meats, marrero, and sausage), processed foods (boxed dinners, canned soups), fried and fast foods. Monitor serving sizes and if the sodium per serving size is more than 200 mg, avoid those foods. If the sodium per serving size is between 100-200 mg, you can use those in limited quantities. Try to choose foods where the amount of sodium per serving size is less than 100 mg. Try to eat a diet rich in fruits and vegetables, whole grains, low fat dairy products, skinless poultry and fish, nuts, beans, non-tropical vegetable oils. Limit saturated fat, trans fat, sodium, red meats, and sugar-sweetened beverages.   Limit alcohol     -The combination of a reduced-calorie diet and increased physical activity is recommended. Adults should aim to get at least 150 minutes of moderate physical activity per week (30 minutes of moderate physical activities at least 5 days per week). Examples of moderate physical activities include brisk walking, swimming, aerobic dancing, heavy gardening, jumping rope, bicycling 10 MPH or faster, tennis, hiking uphill or with a heavy backpack. Please let us know if you would like to learn more about your nutrition and calories and additional options including weight loss programs to help you reach your goal.     -If " you smoke, stop smoking. If you stop smoking you can help get rid of a major source of stress to your heart. Smoking makes your heart rate and blood pressure go up and increases your risk or developing cardiovascular diseases and worsen symptoms associated with heart failure.     -Obtain a BP monitor and monitor your BP daily. Check it around the same time each day; at least 1 hour after taking your medications. Record your BP in a log and bring your log with you to your doctors appointment.     -F/u with your PCP as recommended.

## 2025-05-01 NOTE — PROGRESS NOTES
"Referred by Baron Schafer DO for Follow-up     HPI:    Peña Arenas is a 70 y.o. male with pertinent history of   has a past medical history of Essential (primary) hypertension (10/14/2013) and Personal history of colonic polyps (01/13/2015). who presents to cardiology clinic to establish care.     Recall that he presented with  EKG showed sinus rhythm with concerns for ST elevation in leads II, III, AVF and V4-V6. He was loaded with aspirin 324 mg oral x1 and Brilinta 180 mg x1. Subsequently taken for urgent coronary angiogram which showed  99% thrombotic occlusion of a branch of OM 2, otherwise mild to moderate CAD elsewhere in a co-dominant system with subsequent PCI to OM2 with 2.5 x 22 mm GEM. The OM 2 branch supplies part of the lateral and inferior walls which explains his massive inferior and lateral ST elevation on EKG at presentation.  Course was complicated by LV thrombus formation and subsequent echocardiogram leading to discontinuation of Brilinta, reloading with clopidogrel and beginning apixaban.    1/16/2025 --he presents to establish care following his discharge from the hospital.  He reports that he has been compliant with all medications.  Is not had any bleeding or bruising complications.      5/1/2025 --> He returns for follow up,.  And is seen in the presence of his wife.  Permission was granted discussed medical care what ever I know he has not been taking his medications.  Reports that \"it never came and I was never called\" States that he only had Apixaban. Reports that he \"has only been taking what I was given\".  States \"they never sent me anything, never called me, so I am only taking what I was given\".  He does not know his medications.  It appears that none of this was discussed during any of his transitional care appointments, nor discussed that any of his cardiac rehab appointments.  Luckily, he has not had any recurrent chest heaviness, or tightness.  He has not had any " shortness of breath.  He reports that he is graduating from cardiac rehab tomorrow.  He does note that he was able to obtain his cardiac MRI.      No exacerbating or relieving factors.  Patient denies chest pain and angina.  Pt denies orthopnea, and paroxysmal nocturnal dyspnea.  Pt denies worsening lower extremity edema.  Pt denies palpitations or syncope.  No recent falls.  No fever or chills.  No cough.  No change in bowel or bladder habits.  No sick contacts.  No recent travel.    12 point review of systems including (Constitutional, Eyes, ENMT, Respiratory, Cardiac, Gastrointestinal, Neurological, Psychiatric, and Hematologic) was performed and is otherwise negative.    Past medical history reviewed:   has a past medical history of Essential (primary) hypertension (10/14/2013) and Personal history of colonic polyps (01/13/2015).    Past surgical history reviewed:   has a past surgical history that includes Other surgical history (10/21/2014); Colonoscopy (10/21/2014); Cardiac catheterization (N/A, 12/23/2024); and Cardiac catheterization (N/A, 12/23/2024).    Social history reviewed:   reports that he has been smoking cigars. He has never used smokeless tobacco. He reports current alcohol use. He reports that he does not currently use drugs after having used the following drugs: Marijuana.     Family history reviewed:    Family History   Problem Relation Name Age of Onset    Hypertension Mother      Cancer Father      Lung disease Sister      Stroke Cousin          mid 50s       Allergies reviewed: Patient has no known allergies.     Medications reviewed:   Current Outpatient Medications   Medication Instructions    apixaban (ELIQUIS) 5 mg, oral, 2 times daily    atorvastatin (LIPITOR) 80 mg, oral, Nightly    carvedilol (COREG) 6.25 mg, oral, 2 times daily    clopidogrel (PLAVIX) 75 mg, oral, Daily    losartan (COZAAR) 25 mg, oral, Daily    polyethylene glycol (GLYCOLAX, MIRALAX) 17 g, oral, Daily     spironolactone (ALDACTONE) 12.5 mg, oral, Daily    tamsulosin (FLOMAX) 0.4 mg, Daily        Vitals reviewed: Visit Vitals  BP (!) 133/96 (BP Location: Right arm, Patient Position: Sitting)   Pulse 56         Physical Exam:     General:  Patient is awake, alert, and oriented.  Patient is in no acute distress.  HEENT:  Pupils equal and reactive.  Normocephalic.  Moist mucosa.    Neck:  No thyromegaly.  Normal Jugular Venous Pressure.  Cardiovascular:  Regular rate and rhythm.  Normal S1 and S2.  1/6 DONNY.  Pulmonary:  Clear to auscultation bilaterally.  Abdomen:  Soft. Non-tender.   Non-distended.  Positive bowel sounds.  Lower Extremities:  2+ pedal pulses. No LE edema.  Neurologic:  Cranial nerves intact.  No focal deficit.   Skin: Skin warm and dry, normal skin turgor.   Psychiatric: Normal affect.    Last Labs:  CBC -      Lab Results   Component Value Date    WBC 13.0 (H) 01/08/2025    HGB 13.0 (L) 01/08/2025    HCT 38.2 (L) 01/08/2025     01/08/2025        CMP-  Lab Results   Component Value Date    GLUCOSE 101 (H) 12/27/2024     12/27/2024    K 4.0 12/27/2024     12/27/2024    CO2 23 12/27/2024    ANIONGAP 12 12/27/2024    BUN 13 12/27/2024    CREATININE 1.19 12/27/2024    EGFR 67 12/27/2024    CALCIUM 8.4 (L) 12/27/2024    PHOS 2.5 12/25/2024    PROT 8.0 12/23/2024    ALBUMIN 3.0 (L) 12/25/2024    AST 79 (H) 12/23/2024    ALT 20 12/23/2024    ALKPHOS 74 12/23/2024    BILITOT 0.6 12/23/2024        LIPIDS-  Lab Results   Component Value Date    CHOL 177 12/24/2024    TRIG 91 12/24/2024    HDL 39.4 12/24/2024    CHHDL 4.5 12/24/2024    VLDL 18 12/24/2024        OTHERS-  Lab Results   Component Value Date    HGBA1C 5.8 (H) 12/24/2024        I personally reviewed the patient's recent vitals, labs, medications, orders, EKGs, pertinent cardiac imaging/ echocardiography and ischemic evaluations including stress testing/ cardiac catheterization.    [facility] 04/2025  FINDINGS:  CARDIAC  CHAMBERS:  Normal atrioventricular and ventriculoarterial concordance      LEFT ATRIUM:  Normal size (LIO - 27.51 ml/m2).      RIGHT ATRIUM:  Normal size (RA area max 4ch - 16.66 cm2)      INTERATRIAL SEPTUM:  Intact.      LEFT VENTRICLE:  1. Normal LV size (EDVi 54 ml/m2) with mildly reduced systolic  function (LVEF 44%).  2. Mild hypokinesis of the mid to apical anterolateral walls.  3. Normal LV wall thickness and normal LV indexed mass (LVMi 59 g/m2).  4. Normal native T2 values (<55ms).  5. Borderline increased ECV 30%.  6. No evidence of LV thrombus.  7. Delayed-enhancement imaging was performed. There is mild  subendocardial hyperenhancement involving the basal to apical lateral  walls.      Quantitative left ventricular functional values are as follows:  EDV = 120.85 cc; EDVi = 53.73 cc/m2  ESV = 68.03 cc; ESVi = 30.25 cc/m2  Absolute Cardiac Output = 3.12 l/min.; COi = 1.39 l/min/m2  LV mass = 133.14 gm; LVMi = 59.20 gm/m2  Stroke volume = 52.82 cc; SVi = 30.25 cc/m2  LVEF = 44 %      LV septal wall thickness (anterobasal): 1.3 cm  LV postero-inferior wall thickness: 1.0 cm  LVEDD: 5.0 cm  LVESD: 3.2 cm      RIGHT VENTRICLE:  1. Normal RV size (EDVi 51 ml/m2) and mildly reduced systolic  function (RVEF 40%).  2. No regional wall motion abnormalities.  3. No abnormal delayed enhancement in the myocardium.      Quantitative right ventricular functional values are as follows:  RVEDV = 114.95 ml; RVEDVi = 51.11 ml/m2  RVESV = 68.45 ml; RVESVi = 30.43 ml/m2  RVSV = 46.50 ml; RVSVi = 20.68 ml/m2  RVEF = 40.00 %  RVCO = 2.74 l/min; RVCI = 1.22 l/min/m2      INTERVENTRICULAR SEPTUM:  Intact.      AORTIC VALVE:  The aortic valve is trileaflet.  There is quantitatively mild aortic regurgitation.  Flow quantification through the ascending aorta:  Forward volume = 46.77 cc/beat  Reverse volume = -4.05 cc/beat  Net forward volume = 42.72 cc/beat  Aortic regurgitant fraction = 9 %      MITRAL VALVE:  The mitral valve  leaflets appear normal.  There is mild mitral regurgitation.  Integrating LV volumetric and aortic flow quantification data reveals:  Quantitative mitral regurgitant volume = 10.10 cc/beat  Quantitative mitral regurgitant fraction = 19 %      TRICUSPID VALVE:  There is qualitatively trivial tricuspid regurgitation.      PULMONARY VALVE:  Not assessed.      PERICARDIUM:  The pericardium is normal.  There is no pericardial effusion.      THORACIC AORTA:  The thoracic aorta appears normal in course and contour. The aortic  root is normal in size. There is no evidence for acute aortic  pathology. The ascending thoracic aorta is 3.8 cm in diameter. The  arch vessel branching pattern is  normal.   All the arch branch  vessels appear widely patent in their proximal portions.      AORTIC ROOT DIMENSIONS:  Annulus: 2.2 cm  Aortic root(sinus of valsalva): 3.8 cm  Sinotubular junction: 2.8 cm      PULMONARY ARTERIES:  The central pulmonary arteries appear normal (MPA-2.5 cm, RPA-1.7 cm,  LPA-1.9 cm).      SYSTEMIC AND PULMONARY VEINS:  Normal systemic venous and pulmonary venous return.  The SVC is of normal caliber. IVC appears normal  Normal pulmonary venous anatomy.      CHEST:  The chest wall is normal.  Limited imaging through the lungs reveals no gross abnormalities. No  pleural effusion.      UPPER ABDOMEN:  Limited imaging through the upper abdomen reveals no abnormalities of  the visualized organs.      IMPRESSION:  1. Normal LV size (EDVi 54 ml/m2) with mildly reduced systolic  function (LVEF 44%).  2. Mild hypokinesis of the mid to apical anterolateral walls.  3. No evidence of myocardial inflammation/edema on T2-weighted  imaging (T2 mapping).  4. No evidence of LV thrombus.  5. Subendocardial infarction of the mid-apical anterolateral/lateral  wall with residual viability (<25% wall thickness).  5. Normal RV size (EDVi 51 ml/m2) with mildly reduced systolic  function (RVEF 40%).      The CMR findings are  "consistent with ischemic cardiomyopathy      Assessment and Plan:  Problem List Items Addressed This Visit       Benign essential hypertension - Primary    Relevant Orders    ECG 12 lead (Clinic Performed)    CBC    Comprehensive metabolic panel    BPH (benign prostatic hyperplasia)    Relevant Orders    CBC    Comprehensive metabolic panel    Dyslipidemia    Gross hematuria    Overview   Gross Hematuria noted after Cath when on Triple therapy   -- Had mcwilliams catheter briefly   -- Reports resolving, occasional spotting remaining          Current Assessment & Plan   No further episodes, but also not taking clopidogrel plus apixaban as best as we can tell.         Relevant Orders    CBC    Comprehensive metabolic panel    Hyperlipidemia    Overview   The ASCVD Risk score (Levon STARKS, et al., 2019) failed to calculate for the following reasons:    Risk score cannot be calculated because patient has a medical history suggesting prior/existing ASCVD    Lab Results   Component Value Date    CHOL 177 12/24/2024    CHOL 250 (H) 03/14/2023    CHOL 207 (H) 03/02/2022     Lab Results   Component Value Date    HDL 39.4 12/24/2024    HDL 46.7 03/14/2023    HDL 43.1 03/02/2022     Lab Results   Component Value Date    LDLCALC 119 (H) 12/24/2024     Lab Results   Component Value Date    TRIG 91 12/24/2024    TRIG 103 03/14/2023     No components found for: \"CHOLHDL\"           Hypertension, uncontrolled    Overview   Carvedilol ( Coreg ) 6.25 mg   Losartan ( Cozaar) 25 mg daily   Spironolactone ( Aldactone) I 12.5 mg          Relevant Medications    losartan (Cozaar) 25 mg tablet    spironolactone (Aldactone) 25 mg tablet    Other Relevant Orders    CBC    Comprehensive metabolic panel    ST elevation myocardial infarction (STEMI), unspecified artery (Multi)    Overview   Inferior and lateral STEMI  -Patient presented with chest discomfort as yesterday.  ECG on presentation showed normal sinus rhythm with ST elevation in inferior and " lateral leads.  He was taken for urgent coronary angiogram which revealed 99% thrombotic occlusion of the branch of OM 2 otherwise mild to moderate CAD elsewhere with subsequent PCI 2.5 22 mm GEM.  - Given Thrombus formation on Echocardiogram, adjust medicaitons as follows   --ACS Regimen will be Clopidogrel 75 mg Daily  + Apixaban 5 mg BID due to LV Thrombus in setting of STEMI   - Cardiac MRI in te O/P setting has been ordered          Relevant Medications    clopidogrel (Plavix) 75 mg tablet    atorvastatin (Lipitor) 80 mg tablet    carvedilol (Coreg) 6.25 mg tablet    Other Relevant Orders    CBC    Comprehensive metabolic panel    B-Type Natriuretic Peptide    Troponin I, High Sensitivity     Other Visit Diagnoses         STEMI (ST elevation myocardial infarction) (Multi)        Relevant Medications    apixaban (Eliquis) 5 mg tablet    clopidogrel (Plavix) 75 mg tablet    carvedilol (Coreg) 6.25 mg tablet    Other Relevant Orders    CBC    Comprehensive metabolic panel    B-Type Natriuretic Peptide    Troponin I, High Sensitivity    CBC    Comprehensive metabolic panel    B-Type Natriuretic Peptide    Troponin I, High Sensitivity      ST elevation (STEMI) myocardial infarction involving left anterior descending coronary artery (Multi)        Relevant Medications    apixaban (Eliquis) 5 mg tablet    clopidogrel (Plavix) 75 mg tablet    carvedilol (Coreg) 6.25 mg tablet    Other Relevant Orders    CBC    Comprehensive metabolic panel    B-Type Natriuretic Peptide    Troponin I, High Sensitivity      LV (left ventricular) mural thrombus with acute MI (Multi)        Relevant Medications    apixaban (Eliquis) 5 mg tablet    clopidogrel (Plavix) 75 mg tablet    carvedilol (Coreg) 6.25 mg tablet    Other Relevant Orders    CBC    Comprehensive metabolic panel    B-Type Natriuretic Peptide    Troponin I, High Sensitivity      Status post insertion of drug eluting coronary artery stent        Relevant Medications     atorvastatin (Lipitor) 80 mg tablet    carvedilol (Coreg) 6.25 mg tablet    Other Relevant Orders    CBC    Comprehensive metabolic panel    Troponin I, High Sensitivity              Please followup with me in Cardiology clinic within the next 3-4 months  -- please check your medications  Please return to clinic sooner or seek emergent care if your symptoms reoccur or worsen.    Thank you for allowing me to participate in their care.  Please feel free to call me with any further questions or concerns.        Baron Schafer DO   Division of Cardiovascular Medicine  [facility] [place], [facility]

## 2025-05-02 ENCOUNTER — CLINICAL SUPPORT (OUTPATIENT)
Dept: CARDIAC REHAB | Facility: CLINIC | Age: 70
End: 2025-05-02
Payer: MEDICARE

## 2025-05-02 DIAGNOSIS — I21.3 ST ELEVATION MYOCARDIAL INFARCTION (STEMI), UNSPECIFIED ARTERY (MULTI): ICD-10-CM

## 2025-05-02 PROCEDURE — 93798 PHYS/QHP OP CAR RHAB W/ECG: CPT | Performed by: INTERNAL MEDICINE

## 2025-05-02 NOTE — PATIENT INSTRUCTIONS
Cardiac Rehabilitation: Home Exercise Handout    The American College of Sports Medicine and American Heart Association recommends engaging in aerobic exercise 5 to 7 times per week, and staying within your target heart rate range for a minimum of 30 to 60 minutes per session. The following guidelines and components of your home exercise program are similar to those in your Cardiac Rehabilitation program.     The following are recommendations for your exercise routine based on the FITT principle:    Frequency:   5 to 7 days per week    Intensity:   Target Heart Rate = 79-89     RPE = 12 - 16    Time:   30+ minutes per session; 150 total minutes per week    Type:   Aerobic exercise    Specific Recommendations: continue your exercise routine      Warm Up  It is important to start off slow to give your body a change to get used to the increased workload and transition from rest to exercise. Your warm up should consist of a slow paced activity for 2-5 minutes.    Example:  2 - 5 minutes of walking or marching in place     Aerobic Exercises   (Cardio)   If you have home exercise equipment, this is where you can use it! If not, there are still lots of ways to be active at home or outside. The minimum time for aerobic exercise is 10 minutes, building up to 30 - 60 minutes.    Examples: walking, cycling, or dancing     Resistance Training   (Strength, Free Weights, Resistance Bands, Body)   Body weight or resistance exercises can be done at minimum 2 times per week and are important to build up your physical strength and endurance. Your resistance routine should include 6 to 10 different exercises that can be done 10-15 times (repetitions) repeating 1-2 sets. Spend 15 - 20 minutes on these exercises.     Examples: See suggestions from resistance training handout or ask staff       Cool Down   It is important to allow your heart rate and blood pressure to gradually recover from the exercise training phase. The cool-down  "period should be about 5 minutes in total and be incorporated with flexibility or stretching exercises.    Examples: See suggestions from stretching handout      If you're not using a target heart rate, you can ask yourself \"How difficult is the work I am doing?\" By using the RPE scale attached, work should not be too light or too hard. Aim to work at the 12 - 15 level on this scale. You can also check in on your shortness of breath using the RPD scale, where you should be at or below a 3 - Moderate shortness of breath while exercising.                        If you have any additional questions about your home exercise plan, feel free to contact your  at rehabilitation.     Rehab Staff Signed: Lexii Wynne RN   "

## 2025-05-05 ENCOUNTER — APPOINTMENT (OUTPATIENT)
Dept: CARDIAC REHAB | Facility: CLINIC | Age: 70
End: 2025-05-05
Payer: MEDICARE

## 2025-05-05 ENCOUNTER — TELEPHONE (OUTPATIENT)
Dept: CARDIOLOGY | Facility: CLINIC | Age: 70
End: 2025-05-05
Payer: MEDICARE

## 2025-05-05 ENCOUNTER — DOCUMENTATION (OUTPATIENT)
Dept: CARDIOLOGY | Facility: CLINIC | Age: 70
End: 2025-05-05
Payer: MEDICARE

## 2025-05-05 NOTE — TELEPHONE ENCOUNTER
This nurse received a message that pt had requested a refill on Flomax. This nurse left a detailed voicemail message for pt's wife, Eliza, (pt's voicemail was full and could not leave a message) informing her that pt's cardiac medication were refilled 5/1/25 by Dr Schafer and that pt needs to have the Flomax refilled by his primary care doctor.

## 2025-05-08 ENCOUNTER — DOCUMENTATION (OUTPATIENT)
Dept: CARDIAC REHAB | Facility: CLINIC | Age: 70
End: 2025-05-08
Payer: MEDICARE

## 2025-05-08 NOTE — PROGRESS NOTES
Cardiac Rehabilitation Discharge Summary    Name: Peña Arenas  Medical Record Number: [id number]  YOB: 1955  Age: 70 y.o.    Today’s Date: 5/8/2025  Primary Care Physician: Tiny Rodriguez MD  Referring Physician: Baron Scahfer DO  Program Location: [address]     General  Primary Diagnosis:   1. STEMI (ST elevation myocardial infarction) (Multi)  Referral to Cardiac Rehab       2. ST elevation (STEMI) myocardial infarction involving left anterior descending coronary artery (Multi)  Referral to Cardiac Rehab       3. LV (left ventricular) mural thrombus with acute MI (Multi)  Referral to Cardiac Rehab        Onset/Date of Diagnosis: 12/23/2025    Session # 36    AACVPR Risk Stratification: High      Falls Risk: Low  Psychosocial Assessment     Pre PHQ-9: 1  Post PHQ-9: 0     Sent PH-Q 9 to MD if score > 20: No; score < 20    Pt reported/currently experiencing stress: No  Patient uses stress management skills: No   History of: no history of anxiety or depression  Currently seeing a mental health provider: No  Social Support: Yes, Whom:wife  Quality of Life Survey: SF-36   SF-36 Pre Post   Physical Component Score     Mental Component Score       Learning Assessment:  Learning assessment/barriers: Work  Preferred learning method: Visual  Barriers: None  Comments:    Stages of Change:Maintenance    Psychosocial Plan    Goal Status: Met    Psychosocial Goals: Maintain or lower PH-Q 9 score by discharge    Psychosocial Interventions/Education:   2/5/2025 Initial PH-Q 9 score reviewed.  3/26/2025  Effects of stress on the body, risks of unmanaged stress and techniques to minimize anxieties were all discussed in today's session. Handout was given for reference with resources to  Behavioral Health Services should patient need further assistance with stress management. Patient encouraged to notify staff if stressors increase and additional help is required.    4/28/2025 No change in  psychosocial status reported by pt.    Psychosocial Discharge PH-Q 9 decreased to zero    Nutrition Assessment:    Hyperlipidemia: Yes     Lipids:   Lab Results   Component Value Date    CHOL 177 12/24/2024    HDL 39.4 12/24/2024    LDLF 119 (H) 12/24/2024    TRIG 91 12/24/2024             Current Dietary Guidelines: Low fat  Barriers to dietary change: no    Diet Habit Survey: Picture Your Plate  Pre: 50  Post: 65    Diabetes Assessment    Lab Results   Component Value Date    HGBA1C 5.8 (H) 12/24/2024       History of Diabetes: No    Weight Management    Height: 182.9 cm (6')  Weight: 90.2 kg (198 lb 12.8 oz)  BMI (Calculated): 26.96       Nutrition Plan    Goal Status: In progress    Nutrition Goals: Lipid Goal: HDL>45, LDL <70, Total <180, Trigs <150, Improve Diet Habit Survey score by 5-10 points by discharge, Adapt a Mediterranean focused diet prior to discharge, and Learn how to read and interpret nutrition labels prior to discharge    Nutrition Interventions/Education:   2/10/2025  Reviewed PYP with pt. PYP score 49. Encouraged pt to increase intake of fruits and vegetables. He retired in January so his routine is upset and he is getting used to a new routine. Encouraged pt to set specific meal times to help keep him in a regular routine of eating. His wife is vegetarian, encouraged him to eat more plant based meals. He also has some friends who have been cooking meals for him and making adjustments to improve the cooking methods. Gave handouts on Heart Healthy Diet and label reading tips to begin reviewing as he had several questions regarding diet. Encouraged pt to review and follow up with questions as needed. Let pt know about the two nutrition lectures during rehab. If patient has additional questions, consider outpatient MNT with the dietitian.  Signature Mariluz Marcelo RDN, LD    3/3/2025 Cardiac booklet given and lipids reviewed.    Cardiac/Pulmonary Rehab Nutrition Education    3/5/2025  Patient  attended Heart Healthy Diet lecture with program RDN during today's exercise session. Heart Healthy diet tips sheet was given for further review at home and patient was encouraged to come back with any follow up questions.    Dajuan Marcelo RDN, LD    Cardiac/Pulmonary Rehab Nutrition Education    4/2/2025  Label reading education was done with program's RDN during today's visit. Label Reading Highlights handout was given with information discussed during appointment and to assist in review of label contents at home.      Dajuan Marcelo RDN, LD      Nutrition Discharge no new lipid values noted    Exercise Assessment    Home Exercise: No  Mode: local gym  Frequency: 5-7  Duration: 30 minutes    Exercise Prescription     Exercise Prescription based on: Duke Activity Status Index (DASI)    DASI Score: 16.2    MET Score: 4.7    Frequency:  3 days/week   Mode: Treadmill, NuStep, and Recumbent Cycle   Duration: 45 total aerobic minutes   Intensity: RPE 12-16  Target HR:  79-89  MET Level: 4.8  Patient wears supplemental O2: No     Modality Workload METs Duration (minutes)   1 Pre-Exercise      2 Treadmill 2.6 mph @ 5% 4.8 15 :00   3 NuStep [address] @ Load 9 3.3 15 :00   4 Recumbent Bike Load 8 @ 60 rpm  4.8 15 :00   5 Cooldown    5 :00   6 Post-Exercise        Resistance Training: No   Home Exercise Prescription given: Yes    Exercise Plan    Goal Status: In progress    Exercise Goals: Increase total exercise duration to 30-45 minutes, Obtain 150 minutes/week of moderate intensity aerobic exercise, and Establish a home exercise program before discharge    Exercise Interventions/Education:   2/12/2025  Resistance training education was done. Resistance bands were measured and cut for patient to begin home strength training exercises. Frequency, intensity/RPE, and form were reviewed with educational handout given for further reference. Patient was encouraged to comeback with any questions.     2/19/2025  Cardiovascular changes with exercise were discussed in today's session. Both immediate and long term effects of exercise were covered and the importance of cooling down post workout was reviewed.     3/12/2025  Exercise prescription and maintenance education was done during today's session. Discussed FITT principle, reviewed perceived exertion scale, and patient's THR was given for home practice with instructions on how to obtain. Handouts were given for personal reference. Home exercise prescription is to be given prior to discharge from program.      4/23/2025  Stretching education was done with patient during today's rehab session. Demonstrations of seated and standing stretches were done with patient, in addition to discussing the benefits of flexibility training. Patient was provided handout with examples of exercises and further instructions to be practiced at home. Patient encouraged to report to rehab staff with any questions or concerns.        Exercise Discharge Pt plans to continue his exercise routine at the local gym.    Other Core Components/Risk Factor Assessment:    Medication adherence  Current Medications:   Medication Documentation Review Audit       Reviewed by Baron Schafer DO (Physician) on 05/01/25 at 0852      Medication Order Taking? Sig Documenting Provider Last Dose Status   apixaban (Eliquis) 5 mg tablet [id number] Yes Take 1 tablet (5 mg) by mouth 2 times a day. Baron Schafer DO  Active   atorvastatin (Lipitor) 80 mg tablet [id number]  Take 1 tablet (80 mg) by mouth once daily at bedtime.   Patient not taking: Reported on 5/1/2025    Baron Schafer DO  Active   carvedilol (Coreg) 6.25 mg tablet [id number]  Take 1 tablet (6.25 mg) by mouth 2 times a day.   Patient not taking: Reported on 5/1/2025    Baron Schafer DO  Active   clopidogrel (Plavix) 75 mg tablet [id number]  Take 1 tablet (75 mg) by mouth once daily. Baron Schafer DO  Active   losartan (Cozaar) 25 mg  tablet [id number]  Take 1 tablet (25 mg) by mouth once daily. Baron Schafer DO  Active   polyethylene glycol (Glycolax, Miralax) 17 gram/dose powder [id number]  Mix 17 g of powder and drink once daily.   Patient not taking: Mix of powder and drink. Reported on 1/16/2025    Roseanne Solis PA-C  Active   spironolactone (Aldactone) 25 mg tablet [id number] Yes Take 0.5 tablets (12.5 mg) by mouth once daily. Baron Schafer DO  Active   tamsulosin (Flomax) 0.4 mg 24 hr capsule [id number]  Take 1 capsule (0.4 mg) by mouth once daily. Historical Provider, MD  Active                                 Medication compliance: Yes   Uses pill box/organizer: No    Carries medication list: Yes MyChart    Blood Pressure Management  History of Hypertension: Yes   Medication Changes: Yes   Resting BP:  pre-ex. 134/88; post-ex. 114/82       Heart Failure Management  Hx of Heart Failure: No    Smoking/Tobacco Assessment  Social History     Tobacco Use   Smoking Status Some Days    Types: Cigars   Smokeless Tobacco Never   Tobacco Comments    1-2 cigar occasionally       Other Core Component Plan    Goal Status: In progress    Other Core Component Goals: Verbalize medication usage and drug actions by discharge, Begin tobacco cessation program, Set tobacco cessation quit date while enrolled, and Achieve resting BP of < 130/80 by discharge    Other Core Component Interventions/Education:   2/26/2025  Education on sodium intake and consequences of high blood pressure was done during today's session. Discussed with patient the risks of excess levels of sodium and how to decrease dietary intake with provided list of substitutions. Handouts were given for home reference.     3/19/2025  Coronary artery disease education was done during today's session. Signs and symptoms of possible heart attack, risk factors for prevention, and lifestyle behavior modifications were all explained. Handout for personal use was given.      4/9/2025  Discussion  of prescribed drug names, doses, side effects, and medication uses was done with patient. Education handout, Medications Used for Cardiac Conditions was also given to the patient. Patient was instructed to come back with any questions.    4/16/2025  Risk factors of cardiovascular disease were discussed today's. Education on modifiable and non-modifiable risk factors of CVD and recommendations to manage them was done today. Handouts were provided along with further reference. Patient was educated to comeback with further questions.     5/2/205 Pt reports that he has not been taking his medications since his prescriptions ran out.  He stated that he felt better and that he is all fixed.  Pt never got refill on his meds.  He took whatever he had.  Pt never told staff that he ran out of his meds.  He always says that he takes all his medications.  Pt educated on the importance of taking his medications as prescribed and to contact his provider if he needed refill.   5/7/2025 Dr. Schafer notified that pt has not been taking his medications.      Other Core Components Discharge Pt educated on his medications and the importance of taking his meds as prescribed.    Individual Patient Goals:    Establish aerobic exercise by discharge.  Be able to travel by discharge.    Goal Status: In progress    Staff Comments:  Mr. Arenas has completed 36 sessions of Cardiac Rehab.  He has successfully progressed through a supervised exercise program including treadmill walking, stationary cycling and exercising on the NuStep.  He achieved his target heart rate.  He increased his exercise duration by 88% and exercise intensity/METS by 100%.  He plans on continuing his exercise routine at the local gym.  He was educated on risk factor modification including diet, exercise, stress management and medication adherence.  He was educated on the importance of taking his medications as prescribed by his providers and to contact them for any  refills.  BP occasionally high.  He voiced no cardiac complaints and no ectopy was noted on the monitor.  Thank you for your referral.    Rehab Staff Signature: Chelita Cee RN

## 2025-07-14 ASSESSMENT — ENCOUNTER SYMPTOMS: CONSTITUTIONAL NEGATIVE: 1

## 2025-07-15 ENCOUNTER — APPOINTMENT (OUTPATIENT)
Dept: PRIMARY CARE | Facility: CLINIC | Age: 70
End: 2025-07-15
Payer: MEDICARE

## 2025-07-15 VITALS
WEIGHT: 191 LBS | OXYGEN SATURATION: 97 % | HEART RATE: 55 BPM | DIASTOLIC BLOOD PRESSURE: 73 MMHG | BODY MASS INDEX: 25.2 KG/M2 | SYSTOLIC BLOOD PRESSURE: 111 MMHG

## 2025-07-15 DIAGNOSIS — E78.01 FAMILIAL HYPERCHOLESTEROLEMIA: ICD-10-CM

## 2025-07-15 DIAGNOSIS — Z00.00 ROUTINE GENERAL MEDICAL EXAMINATION AT HEALTH CARE FACILITY: ICD-10-CM

## 2025-07-15 DIAGNOSIS — L60.0 INGROWN LEFT BIG TOENAIL: ICD-10-CM

## 2025-07-15 DIAGNOSIS — Z23 NEED FOR PNEUMOCOCCAL 20-VALENT CONJUGATE VACCINATION: ICD-10-CM

## 2025-07-15 DIAGNOSIS — Z00.00 MEDICARE ANNUAL WELLNESS VISIT, INITIAL: Primary | Chronic | ICD-10-CM

## 2025-07-15 DIAGNOSIS — N40.1 BENIGN PROSTATIC HYPERPLASIA WITH LOWER URINARY TRACT SYMPTOMS, SYMPTOM DETAILS UNSPECIFIED: ICD-10-CM

## 2025-07-15 DIAGNOSIS — I10 BENIGN ESSENTIAL HYPERTENSION: ICD-10-CM

## 2025-07-15 PROCEDURE — 90677 PCV20 VACCINE IM: CPT | Performed by: INTERNAL MEDICINE

## 2025-07-15 PROCEDURE — 1160F RVW MEDS BY RX/DR IN RCRD: CPT | Performed by: INTERNAL MEDICINE

## 2025-07-15 PROCEDURE — 3078F DIAST BP <80 MM HG: CPT | Performed by: INTERNAL MEDICINE

## 2025-07-15 PROCEDURE — 99214 OFFICE O/P EST MOD 30 MIN: CPT | Performed by: INTERNAL MEDICINE

## 2025-07-15 PROCEDURE — 4004F PT TOBACCO SCREEN RCVD TLK: CPT | Performed by: INTERNAL MEDICINE

## 2025-07-15 PROCEDURE — G2211 COMPLEX E/M VISIT ADD ON: HCPCS | Performed by: INTERNAL MEDICINE

## 2025-07-15 PROCEDURE — G0009 ADMIN PNEUMOCOCCAL VACCINE: HCPCS | Performed by: INTERNAL MEDICINE

## 2025-07-15 PROCEDURE — 3074F SYST BP LT 130 MM HG: CPT | Performed by: INTERNAL MEDICINE

## 2025-07-15 PROCEDURE — 1159F MED LIST DOCD IN RCRD: CPT | Performed by: INTERNAL MEDICINE

## 2025-07-15 PROCEDURE — G0439 PPPS, SUBSEQ VISIT: HCPCS | Performed by: INTERNAL MEDICINE

## 2025-07-15 PROCEDURE — 1170F FXNL STATUS ASSESSED: CPT | Performed by: INTERNAL MEDICINE

## 2025-07-15 ASSESSMENT — ACTIVITIES OF DAILY LIVING (ADL)
BATHING: INDEPENDENT
GROCERY_SHOPPING: INDEPENDENT
MANAGING_FINANCES: INDEPENDENT
DOING_HOUSEWORK: INDEPENDENT
TAKING_MEDICATION: INDEPENDENT
DRESSING: INDEPENDENT

## 2025-07-15 ASSESSMENT — PATIENT HEALTH QUESTIONNAIRE - PHQ9
1. LITTLE INTEREST OR PLEASURE IN DOING THINGS: NOT AT ALL
SUM OF ALL RESPONSES TO PHQ9 QUESTIONS 1 AND 2: 0
2. FEELING DOWN, DEPRESSED OR HOPELESS: NOT AT ALL

## 2025-07-15 NOTE — PROGRESS NOTES
Patient ID: Peña Arenas Jr. is a 70 y.o. male who presents for Medicare Annual Wellness Visit Initial and Follow-up (MEDICARE WELLNESS VISIT).    /73 (BP Location: Left arm, Patient Position: Sitting, BP Cuff Size: Adult)   Pulse 55   Wt 86.6 kg (191 lb)   SpO2 97%   BMI 25.20 kg/m²     HPI        CAD       S/P PCI  PT PRESENTLY ON ELIQUIS   AND PLAVIX   PT HAD CARDIAC MRI   NO EVIDENCE OF LEFT VENTRICULAR THROMBUS   LVEF 44%  CMR FINDINGS CONSISTENT WITH ISCHEMIC CARDIOMYOPATHY       HTN ON CARVEDILOL 6.25MG PO BID   LOSARTAN 25MG PO every day   SPIRONOLACTONE 12.5 MG PO every day          He was started on apixaban 5 mg 1 tablet twice daily  Aspirin 81 mg, discontinued on 12/29/2024  Clopidogrel 75 mg every day  Atorvastatin 80 mg every day  Carvedilol 6.25 mg 1 tablet twice daily     I reviewed the cardiology consultation note  Reviewed echo result which shows left ventricular thrombus     Patient is scheduled to have an MRI of the heart as an outpatient  He is also scheduled to follow-up with the cardiologist     He is not having any more chest pain  No shortness of breath, no PND, no orthopnea     He is other history significant for hypertension  Hyperlipidemia      ELEVATED PSA  PT SEEN UROLOGY 05/26/2023  WITHY NEGATIVE BIOPSY ON 06/2024       LEFT INGROWN BIG TOE NAIL   PT NEED APPT WITH PODIATRY           Subjective     Review of Systems   Constitutional: Negative.    All other systems reviewed and are negative.      Objective     Physical Exam  Vitals and nursing note reviewed.   Constitutional:       Appearance: Normal appearance.   Neck:      Vascular: No carotid bruit.   Cardiovascular:      Rate and Rhythm: Normal rate and regular rhythm.      Pulses: Normal pulses.      Heart sounds: Normal heart sounds. No murmur heard.  Pulmonary:      Effort: Pulmonary effort is normal.      Breath sounds: Normal breath sounds.   Abdominal:      Palpations: There is no mass.   Musculoskeletal:       Right lower leg: No edema.      Left lower leg: No edema.   Feet:      Comments: LEFT BIG TOE INGROWN TOE NAIL   Skin:     Capillary Refill: Capillary refill takes more than 3 seconds.   Neurological:      General: No focal deficit present.      Mental Status: He is oriented to person, place, and time. Mental status is at baseline.   Psychiatric:         Mood and Affect: Mood normal.         Behavior: Behavior normal.         Thought Content: Thought content normal.         Judgment: Judgment normal.         Lab Results   Component Value Date    WBC 13.0 (H) 01/08/2025    HGB 13.0 (L) 01/08/2025    HCT 38.2 (L) 01/08/2025    MCV 91 01/08/2025     01/08/2025           Problem List Items Addressed This Visit       Benign essential hypertension    Relevant Orders    Comprehensive metabolic panel    BPH (benign prostatic hyperplasia)    Hyperlipidemia    Relevant Orders    Lipid panel    Ingrown left big toenail    Relevant Orders    Referral to Podiatry     Other Visit Diagnoses         Medicare annual wellness visit, initial  (Chronic)   -  Primary    Relevant Orders    ECG 12 lead (Clinic Performed)      Routine general medical examination at health care facility        Relevant Orders    1 Year Follow Up In [facility] - PCP - Wellness Exam                A/P         EKG NSR   LIPID, CMP   REFFERAL PODIATRY         Advance Care Planning Note     Discussion Date: 07/15/25   Discussion Participants: patient    The patient wishes to discuss Advance Care Planning today and the following is a brief summary of our discussion.     Patient has capacity to make their own medical decisions: Yes  Health Care Agent/Surrogate Decision Maker documented in chart: Yes    Documents on file and valid:  Advance Directive/Living Will: No   Health Care Power of : Yes  Other:     Communication of Medical Status/Prognosis:        Communication of Treatment Goals/Options:        Discussed with the patient  end-of-life choices patient is presently full code he does not have a living will or healthcare power of  he will think about getting it done when ready will bring it on next office visit so that it can be scanned into the chart for the purpose of documentation    Treatment Decisions  Goals of Care: survival is paramount regardless of prognosis, treatment outcome, or burden       Follow Up Plan    Team Members    Time Statement: Total face to face time spent on advance care planning was 5 minutes with 5 minutes spent in counseling, including the explanation.    Tiny Rodriguez MD  7/15/2025 10:11 AM

## 2025-07-16 LAB
ALBUMIN SERPL-MCNC: 4.2 G/DL (ref 3.6–5.1)
ALP SERPL-CCNC: 67 U/L (ref 35–144)
ALT SERPL-CCNC: 15 U/L (ref 9–46)
ANION GAP SERPL CALCULATED.4IONS-SCNC: 9 MMOL/L (CALC) (ref 7–17)
AST SERPL-CCNC: 17 U/L (ref 10–35)
BILIRUB SERPL-MCNC: 0.9 MG/DL (ref 0.2–1.2)
BUN SERPL-MCNC: 14 MG/DL (ref 7–25)
CALCIUM SERPL-MCNC: 9.4 MG/DL (ref 8.6–10.3)
CHLORIDE SERPL-SCNC: 108 MMOL/L (ref 98–110)
CHOLEST SERPL-MCNC: 125 MG/DL
CHOLEST/HDLC SERPL: 3 (CALC)
CO2 SERPL-SCNC: 26 MMOL/L (ref 20–32)
CREAT SERPL-MCNC: 1.3 MG/DL (ref 0.7–1.35)
EGFRCR SERPLBLD CKD-EPI 2021: 60 ML/MIN/1.73M2
GLUCOSE SERPL-MCNC: 89 MG/DL (ref 65–99)
HDLC SERPL-MCNC: 42 MG/DL
LDLC SERPL CALC-MCNC: 67 MG/DL (CALC)
NONHDLC SERPL-MCNC: 83 MG/DL (CALC)
POTASSIUM SERPL-SCNC: 4.9 MMOL/L (ref 3.5–5.3)
PROT SERPL-MCNC: 7.1 G/DL (ref 6.1–8.1)
SODIUM SERPL-SCNC: 143 MMOL/L (ref 135–146)
TRIGL SERPL-MCNC: 76 MG/DL

## 2025-07-18 ENCOUNTER — TELEPHONE (OUTPATIENT)
Dept: CARDIOLOGY | Facility: CLINIC | Age: 70
End: 2025-07-18
Payer: MEDICARE

## 2025-07-18 NOTE — TELEPHONE ENCOUNTER
Pt called inquiring if he had blood work ordered to be done before his upcoming appt with Dr Schafer. After review of pt's chart this nurse informed pt that he does have orders for blood work and he can go to any [facility] to have his blood drawn. Pt verbalized understanding and stated that he would go to the lab today.

## 2025-07-26 LAB
ALBUMIN SERPL-MCNC: 4 G/DL (ref 3.6–5.1)
ALP SERPL-CCNC: 62 U/L (ref 35–144)
ALT SERPL-CCNC: 27 U/L (ref 9–46)
ANION GAP SERPL CALCULATED.4IONS-SCNC: 11 MMOL/L (CALC) (ref 7–17)
AST SERPL-CCNC: 33 U/L (ref 10–35)
BILIRUB SERPL-MCNC: 0.5 MG/DL (ref 0.2–1.2)
BNP SERPL-MCNC: NORMAL PG/ML
BUN SERPL-MCNC: 15 MG/DL (ref 7–25)
CALCIUM SERPL-MCNC: 8.7 MG/DL (ref 8.6–10.3)
CHLORIDE SERPL-SCNC: 103 MMOL/L (ref 98–110)
CO2 SERPL-SCNC: 26 MMOL/L (ref 20–32)
CREAT SERPL-MCNC: 1.5 MG/DL (ref 0.7–1.35)
EGFRCR SERPLBLD CKD-EPI 2021: 50 ML/MIN/1.73M2
ERYTHROCYTE [DISTWIDTH] IN BLOOD BY AUTOMATED COUNT: 14.2 % (ref 11–15)
GLUCOSE SERPL-MCNC: 94 MG/DL (ref 65–99)
HCT VFR BLD AUTO: 43.9 % (ref 38.5–50)
HGB BLD-MCNC: 14.3 G/DL (ref 13.2–17.1)
MCH RBC QN AUTO: 31.1 PG (ref 27–33)
MCHC RBC AUTO-ENTMCNC: 32.6 G/DL (ref 32–36)
MCV RBC AUTO: 95.4 FL (ref 80–100)
PLATELET # BLD AUTO: 199 THOUSAND/UL (ref 140–400)
PMV BLD REES-ECKER: 10.5 FL (ref 7.5–12.5)
POTASSIUM SERPL-SCNC: 4.1 MMOL/L (ref 3.5–5.3)
PROT SERPL-MCNC: 6.7 G/DL (ref 6.1–8.1)
RBC # BLD AUTO: 4.6 MILLION/UL (ref 4.2–5.8)
SODIUM SERPL-SCNC: 140 MMOL/L (ref 135–146)
TROPONIN I SERPL-MCNC: NORMAL NG/ML
WBC # BLD AUTO: 7.1 THOUSAND/UL (ref 3.8–10.8)

## 2025-07-28 LAB
ALBUMIN SERPL-MCNC: 4 G/DL (ref 3.6–5.1)
ALP SERPL-CCNC: 62 U/L (ref 35–144)
ALT SERPL-CCNC: 27 U/L (ref 9–46)
ANION GAP SERPL CALCULATED.4IONS-SCNC: 11 MMOL/L (CALC) (ref 7–17)
AST SERPL-CCNC: 33 U/L (ref 10–35)
BILIRUB SERPL-MCNC: 0.5 MG/DL (ref 0.2–1.2)
BNP SERPL-MCNC: 54 PG/ML
BUN SERPL-MCNC: 15 MG/DL (ref 7–25)
CALCIUM SERPL-MCNC: 8.7 MG/DL (ref 8.6–10.3)
CHLORIDE SERPL-SCNC: 103 MMOL/L (ref 98–110)
CO2 SERPL-SCNC: 26 MMOL/L (ref 20–32)
CREAT SERPL-MCNC: 1.5 MG/DL (ref 0.7–1.35)
EGFRCR SERPLBLD CKD-EPI 2021: 50 ML/MIN/1.73M2
ERYTHROCYTE [DISTWIDTH] IN BLOOD BY AUTOMATED COUNT: 14.2 % (ref 11–15)
GLUCOSE SERPL-MCNC: 94 MG/DL (ref 65–99)
HCT VFR BLD AUTO: 43.9 % (ref 38.5–50)
HGB BLD-MCNC: 14.3 G/DL (ref 13.2–17.1)
MCH RBC QN AUTO: 31.1 PG (ref 27–33)
MCHC RBC AUTO-ENTMCNC: 32.6 G/DL (ref 32–36)
MCV RBC AUTO: 95.4 FL (ref 80–100)
PLATELET # BLD AUTO: 199 THOUSAND/UL (ref 140–400)
PMV BLD REES-ECKER: 10.5 FL (ref 7.5–12.5)
POTASSIUM SERPL-SCNC: 4.1 MMOL/L (ref 3.5–5.3)
PROT SERPL-MCNC: 6.7 G/DL (ref 6.1–8.1)
RBC # BLD AUTO: 4.6 MILLION/UL (ref 4.2–5.8)
SODIUM SERPL-SCNC: 140 MMOL/L (ref 135–146)
TROPONIN I SERPL-MCNC: 20 NG/L
WBC # BLD AUTO: 7.1 THOUSAND/UL (ref 3.8–10.8)

## 2025-07-31 NOTE — ASSESSMENT & PLAN NOTE
Remains stable at this time   -- Medication compliance reinforced   -- Cardiac MRI Completed - NO LV Thrombus - discontinue Apixaban 8/1/2025  -- Cardiac Rehab Completed

## 2025-07-31 NOTE — PROGRESS NOTES
"Referred by No ref. provider found for Follow-up     HPI:    Peña Arenas Jr. is a 70 y.o. male with pertinent history of   has a past medical history of Essential (primary) hypertension (10/14/2013) and Personal history of colonic polyps (01/13/2015). who presents to cardiology clinic to establish care.     Recall that he presented with  EKG showed sinus rhythm with concerns for ST elevation in leads II, III, AVF and V4-V6. He was loaded with aspirin 324 mg oral x1 and Brilinta 180 mg x1. Subsequently taken for urgent coronary angiogram which showed  99% thrombotic occlusion of a branch of OM 2, otherwise mild to moderate CAD elsewhere in a co-dominant system with subsequent PCI to OM2 with 2.5 x 22 mm GEM. The OM 2 branch supplies part of the lateral and inferior walls which explains his massive inferior and lateral ST elevation on EKG at presentation.  Course was complicated by LV thrombus formation and subsequent echocardiogram leading to discontinuation of Brilinta, reloading with clopidogrel and beginning apixaban.    1/16/2025 --he presents to establish care following his discharge from the hospital.  He reports that he has been compliant with all medications.  Is not had any bleeding or bruising complications.      05/1/2025--> He returns for follow up,.  And is seen in the presence of his wife.  Permission was granted discussed medical care what ever I know he has not been taking his medications.  Reports that \"it never came and I was never called\" States that he only had Apixaban. Reports that he \"has only been taking what I was given\".  States \"they never sent me anything, never called me, so I am only taking what I was given\".  He does not know his medications.  It appears that none of this was discussed during any of his transitional care appointments, nor discussed that any of his cardiac rehab appointments.  Luckily, he has not had any recurrent chest heaviness, or tightness.  He has not had any " "shortness of breath.  He reports that he is graduating from cardiac rehab tomorrow.  He does note that he was able to obtain his cardiac MRI.    8/1/2025 --> He returns for follow up.  He is again seen in the presence of his Wife.  Since he was last seen, he has overall done well.  He has completed cardiac rehab.  He assisted in the moving of his family into a new home.  He he was active the participating and had no chest heaviness, tightness, shortness of breath.  He is trying to reduce his smoking \"you consider have quit\" but notes that he he sometimes still smokes cigarettes to keep his mind \"active\".  He otherwise offers no cardiovascular complaints nor concerns.    No exacerbating or relieving factors.  Patient denies chest pain and angina.  Pt denies orthopnea, and paroxysmal nocturnal dyspnea.  Pt denies worsening lower extremity edema.  Pt denies palpitations or syncope.  No recent falls.  No fever or chills.  No cough.  No change in bowel or bladder habits.  No sick contacts.  No recent travel.    12 point review of systems including (Constitutional, Eyes, ENMT, Respiratory, Cardiac, Gastrointestinal, Neurological, Psychiatric, and Hematologic) was performed and is otherwise negative.    Past medical history reviewed:   has a past medical history of Essential (primary) hypertension (10/14/2013) and Personal history of colonic polyps (01/13/2015).    Past surgical history reviewed:   has a past surgical history that includes Other surgical history (10/21/2014); Colonoscopy (10/21/2014); Cardiac catheterization (N/A, 12/23/2024); and Cardiac catheterization (N/A, 12/23/2024).    Social history reviewed:   reports that he has quit smoking. His smoking use included cigars and cigarettes. He has a 0.3 pack-year smoking history. He has never used smokeless tobacco. He reports current alcohol use of about 2.0 standard drinks of alcohol per week. He reports that he does not currently use drugs after having used the " following drugs: Marijuana.     Family history reviewed:    Family History   Problem Relation Name Age of Onset    Hypertension Mother      Cancer Father      Lung disease Sister      Stroke Cousin          mid 50s       Allergies reviewed: Patient has no known allergies.     Medications reviewed:   Current Outpatient Medications   Medication Instructions    apixaban (ELIQUIS) 5 mg, oral, 2 times daily    atorvastatin (LIPITOR) 80 mg, oral, Nightly    carvedilol (COREG) 6.25 mg, oral, 2 times daily    clopidogrel (PLAVIX) 75 mg, oral, Daily    losartan (COZAAR) 25 mg, oral, Daily    spironolactone (ALDACTONE) 12.5 mg, oral, Daily    tamsulosin (FLOMAX) 0.4 mg, Daily        Vitals reviewed: Visit Vitals  /71 (BP Location: Right arm, Patient Position: Sitting, BP Cuff Size: Adult)   Pulse (!) 44   Resp 18           Physical Exam:     General:  Patient is awake, alert, and oriented.  Patient is in no acute distress.  HEENT:  Pupils equal and reactive.  Normocephalic.  Moist mucosa.    Neck:  No thyromegaly.  Normal Jugular Venous Pressure.  Cardiovascular:  Regular rate and rhythm.  Normal S1 and S2.  1/6 DONNY.  Pulmonary:  Clear to auscultation bilaterally.  Abdomen:  Soft. Non-tender.   Non-distended.  Positive bowel sounds.  Lower Extremities:  2+ pedal pulses. No LE edema.  Neurologic:  Cranial nerves intact.  No focal deficit.   Skin: Skin warm and dry, normal skin turgor.   Psychiatric: Normal affect.    Last Labs:  CBC -      Lab Results   Component Value Date    WBC 7.1 07/25/2025    HGB 14.3 07/25/2025    HCT 43.9 07/25/2025     07/25/2025        CMP-  Lab Results   Component Value Date    GLUCOSE 94 07/25/2025     07/25/2025    K 4.1 07/25/2025     07/25/2025    CO2 26 07/25/2025    ANIONGAP 11 07/25/2025    BUN 15 07/25/2025    CREATININE 1.50 (H) 07/25/2025    EGFR 50 (L) 07/25/2025    CALCIUM 8.7 07/25/2025    PHOS 2.5 12/25/2024    PROT 6.7 07/25/2025    ALBUMIN 4.0 07/25/2025     AST 33 07/25/2025    ALT 27 07/25/2025    ALKPHOS 62 07/25/2025    BILITOT 0.5 07/25/2025        LIPIDS-  Lab Results   Component Value Date    CHOL 125 07/15/2025    TRIG 76 07/15/2025    HDL 42 07/15/2025    CHHDL 3.0 07/15/2025    VLDL 18 12/24/2024        OTHERS-  Lab Results   Component Value Date    HGBA1C 5.8 (H) 12/24/2024    BNP 54 07/25/2025        I personally reviewed the patient's recent vitals, labs, medications, orders, EKGs, pertinent cardiac imaging/ echocardiography and ischemic evaluations including stress testing/ cardiac catheterization.    [facility] MRI 04/2025  FINDINGS:  CARDIAC CHAMBERS:  Normal atrioventricular and ventriculoarterial concordance      LEFT ATRIUM:  Normal size (LIO - 27.51 ml/m2).      RIGHT ATRIUM:  Normal size (RA area max 4ch - 16.66 cm2)      INTERATRIAL SEPTUM:  Intact.      LEFT VENTRICLE:  1. Normal LV size (EDVi 54 ml/m2) with mildly reduced systolic  function (LVEF 44%).  2. Mild hypokinesis of the mid to apical anterolateral walls.  3. Normal LV wall thickness and normal LV indexed mass (LVMi 59 g/m2).  4. Normal native T2 values (<55ms).  5. Borderline increased ECV 30%.  6. No evidence of LV thrombus.  7. Delayed-enhancement imaging was performed. There is mild  subendocardial hyperenhancement involving the basal to apical lateral  walls.      Quantitative left ventricular functional values are as follows:  EDV = 120.85 cc; EDVi = 53.73 cc/m2  ESV = 68.03 cc; ESVi = 30.25 cc/m2  Absolute Cardiac Output = 3.12 l/min.; COi = 1.39 l/min/m2  LV mass = 133.14 gm; LVMi = 59.20 gm/m2  Stroke volume = 52.82 cc; SVi = 30.25 cc/m2  LVEF = 44 %      LV septal wall thickness (anterobasal): 1.3 cm  LV postero-inferior wall thickness: 1.0 cm  LVEDD: 5.0 cm  LVESD: 3.2 cm      RIGHT VENTRICLE:  1. Normal RV size (EDVi 51 ml/m2) and mildly reduced systolic  function (RVEF 40%).  2. No regional wall motion abnormalities.  3. No abnormal delayed enhancement in the myocardium.       Quantitative right ventricular functional values are as follows:  RVEDV = 114.95 ml; RVEDVi = 51.11 ml/m2  RVESV = 68.45 ml; RVESVi = 30.43 ml/m2  RVSV = 46.50 ml; RVSVi = 20.68 ml/m2  RVEF = 40.00 %  RVCO = 2.74 l/min; RVCI = 1.22 l/min/m2      INTERVENTRICULAR SEPTUM:  Intact.      AORTIC VALVE:  The aortic valve is trileaflet.  There is quantitatively mild aortic regurgitation.  Flow quantification through the ascending aorta:  Forward volume = 46.77 cc/beat  Reverse volume = -4.05 cc/beat  Net forward volume = 42.72 cc/beat  Aortic regurgitant fraction = 9 %      MITRAL VALVE:  The mitral valve leaflets appear normal.  There is mild mitral regurgitation.  Integrating LV volumetric and aortic flow quantification data reveals:  Quantitative mitral regurgitant volume = 10.10 cc/beat  Quantitative mitral regurgitant fraction = 19 %      TRICUSPID VALVE:  There is qualitatively trivial tricuspid regurgitation.      PULMONARY VALVE:  Not assessed.      PERICARDIUM:  The pericardium is normal.  There is no pericardial effusion.      THORACIC AORTA:  The thoracic aorta appears normal in course and contour. The aortic  root is normal in size. There is no evidence for acute aortic  pathology. The ascending thoracic aorta is 3.8 cm in diameter. The  arch vessel branching pattern is  normal.   All the arch branch  vessels appear widely patent in their proximal portions.      AORTIC ROOT DIMENSIONS:  Annulus: 2.2 cm  Aortic root(sinus of valsalva): 3.8 cm  Sinotubular junction: 2.8 cm      PULMONARY ARTERIES:  The central pulmonary arteries appear normal (MPA-2.5 cm, RPA-1.7 cm,  LPA-1.9 cm).      SYSTEMIC AND PULMONARY VEINS:  Normal systemic venous and pulmonary venous return.  The SVC is of normal caliber. IVC appears normal  Normal pulmonary venous anatomy.      CHEST:  The chest wall is normal.  Limited imaging through the lungs reveals no gross abnormalities. No  pleural effusion.      UPPER ABDOMEN:  Limited imaging  "through the upper abdomen reveals no abnormalities of  the visualized organs.      IMPRESSION:  1. Normal LV size (EDVi 54 ml/m2) with mildly reduced systolic  function (LVEF 44%).  2. Mild hypokinesis of the mid to apical anterolateral walls.  3. No evidence of myocardial inflammation/edema on T2-weighted  imaging (T2 mapping).  4. No evidence of LV thrombus.  5. Subendocardial infarction of the mid-apical anterolateral/lateral  wall with residual viability (<25% wall thickness).  5. Normal RV size (EDVi 51 ml/m2) with mildly reduced systolic  function (RVEF 40%).      The CMR findings are consistent with ischemic cardiomyopathy      Assessment and Plan:  Problem List Items Addressed This Visit       Benign essential hypertension - Primary    Hyperlipidemia    Overview   The ASCVD Risk score (Levon STARKS, et al., 2019) failed to calculate for the following reasons:    Risk score cannot be calculated because patient has a medical history suggesting prior/existing ASCVD    Lab Results   Component Value Date    CHOL 177 12/24/2024    CHOL 250 (H) 03/14/2023    CHOL 207 (H) 03/02/2022     Lab Results   Component Value Date    HDL 39.4 12/24/2024    HDL 46.7 03/14/2023    HDL 43.1 03/02/2022     Lab Results   Component Value Date    LDLCALC 119 (H) 12/24/2024     Lab Results   Component Value Date    TRIG 91 12/24/2024    TRIG 103 03/14/2023     No components found for: \"CHOLHDL\"           Hypertension, uncontrolled    Overview   Carvedilol ( Coreg ) 6.25 mg   Losartan ( Cozaar) 25 mg daily   Spironolactone ( Aldactone) I 12.5 mg          ST elevation myocardial infarction (STEMI), unspecified artery (Multi)    Overview   Inferior and lateral STEMI  -Patient presented with chest discomfort as yesterday.  ECG on presentation showed normal sinus rhythm with ST elevation in inferior and lateral leads.  He was taken for urgent coronary angiogram which revealed 99% thrombotic occlusion of the branch of OM 2 otherwise mild to " moderate CAD elsewhere with subsequent PCI 2.5 22 mm GEM.  - Given Thrombus formation on Echocardiogram, adjust medicaitons as follows   --ACS Regimen will be Clopidogrel 75 mg Daily  + Apixaban 5 mg BID due to LV Thrombus in setting of STEMI   - Cardiac MRI showed resolution of the thrombus and  mildsubendocardial hyperenhancement involving the basal to apical lateral walls consistent with prior injury         Current Assessment & Plan   Remains stable at this time   -- Medication compliance reinforced   -- Cardiac MRI Scheduled; he is to continue medications ( Apixaban 5mg BID + Clopidogrel 75 mg Daily )   -- Cardiac Rehab referral                 Please followup with me in Cardiology clinic within the next 3-4 months  -- please check your medications  Please return to clinic sooner or seek emergent care if your symptoms reoccur or worsen.    Thank you for allowing me to participate in their care.  Please feel free to call me with any further questions or concerns.        Baron Schafer DO   Division of Cardiovascular Medicine  [facility] [place], [facility]

## 2025-08-01 ENCOUNTER — APPOINTMENT (OUTPATIENT)
Dept: CARDIOLOGY | Facility: CLINIC | Age: 70
End: 2025-08-01
Payer: MEDICARE

## 2025-08-01 VITALS
OXYGEN SATURATION: 99 % | SYSTOLIC BLOOD PRESSURE: 100 MMHG | RESPIRATION RATE: 18 BRPM | WEIGHT: 187 LBS | HEIGHT: 73 IN | DIASTOLIC BLOOD PRESSURE: 71 MMHG | BODY MASS INDEX: 24.78 KG/M2 | HEART RATE: 44 BPM

## 2025-08-01 DIAGNOSIS — E78.01 FAMILIAL HYPERCHOLESTEROLEMIA: ICD-10-CM

## 2025-08-01 DIAGNOSIS — I10 BENIGN ESSENTIAL HYPERTENSION: Primary | ICD-10-CM

## 2025-08-01 DIAGNOSIS — I21.3 ST ELEVATION MYOCARDIAL INFARCTION (STEMI), UNSPECIFIED ARTERY (MULTI): ICD-10-CM

## 2025-08-01 DIAGNOSIS — I10 HYPERTENSION, UNCONTROLLED: ICD-10-CM

## 2025-08-01 LAB
ATRIAL RATE: 44 BPM
P AXIS: 34 DEGREES
P OFFSET: 189 MS
P ONSET: 126 MS
PR INTERVAL: 172 MS
Q ONSET: 212 MS
QRS COUNT: 8 BEATS
QRS DURATION: 132 MS
QT INTERVAL: 524 MS
QTC CALCULATION(BAZETT): 448 MS
QTC FREDERICIA: 472 MS
R AXIS: 15 DEGREES
T AXIS: 68 DEGREES
T OFFSET: 474 MS
VENTRICULAR RATE: 44 BPM

## 2025-08-01 PROCEDURE — 3008F BODY MASS INDEX DOCD: CPT | Performed by: INTERNAL MEDICINE

## 2025-08-01 PROCEDURE — G2211 COMPLEX E/M VISIT ADD ON: HCPCS | Performed by: INTERNAL MEDICINE

## 2025-08-01 PROCEDURE — 99212 OFFICE O/P EST SF 10 MIN: CPT

## 2025-08-01 PROCEDURE — 99214 OFFICE O/P EST MOD 30 MIN: CPT | Performed by: INTERNAL MEDICINE

## 2025-08-01 PROCEDURE — 3078F DIAST BP <80 MM HG: CPT | Performed by: INTERNAL MEDICINE

## 2025-08-01 PROCEDURE — 93005 ELECTROCARDIOGRAM TRACING: CPT | Performed by: INTERNAL MEDICINE

## 2025-08-01 PROCEDURE — 3074F SYST BP LT 130 MM HG: CPT | Performed by: INTERNAL MEDICINE

## 2025-08-01 PROCEDURE — 1159F MED LIST DOCD IN RCRD: CPT | Performed by: INTERNAL MEDICINE

## 2025-08-01 ASSESSMENT — ENCOUNTER SYMPTOMS: DEPRESSION: 0

## 2025-08-01 ASSESSMENT — COLUMBIA-SUICIDE SEVERITY RATING SCALE - C-SSRS
2. HAVE YOU ACTUALLY HAD ANY THOUGHTS OF KILLING YOURSELF?: NO
6. HAVE YOU EVER DONE ANYTHING, STARTED TO DO ANYTHING, OR PREPARED TO DO ANYTHING TO END YOUR LIFE?: NO
1. IN THE PAST MONTH, HAVE YOU WISHED YOU WERE DEAD OR WISHED YOU COULD GO TO SLEEP AND NOT WAKE UP?: NO

## 2025-08-01 NOTE — PATIENT INSTRUCTIONS
"It was a pleasure seeing you today. I would like to see you back in clinic in  January 2026.  You can call my office if questions arise between now and our next visit.     Today, we talked about your heart attack     You need to remain on your medications. YOU NEED TO MAKE SURE THAT YOU HAVE THESE MEDICATIONS EACH AND EVERY DAY       ++ Apixaban ( Eliquis ) 5 mg Twice Daily  --> blood thinner to reduce blood clot formation within the heart __> THIS MEDICATION IS COMPLETE. YOU CAN FINISH YOUR REMAINING SUPPLY AND THEN STOP     ++ Clopidogrel ( Plavix) 75 mg daily --> Anti-platelet that helps prevent platelets from sticking and creating a smaller blood clot.     ++ Atorvastatin ( lipitor) 80 mg taken once daily is designed to help lower cholesterol and stabilize plaque    ++ Carvedilol ( Coreg ) 6.25 mg taken twice daily as a beta-blocker designed to help lower blood pressure and heart rates to help your heart heal IS GOING TO BE REDUCED TO 1/2 TABLET  TWICE A DAILY     ++ Losartan ( Cozaar) 25 mg daily is a blood pressure medication that also helps the heart to heal.      ++  Spironolactone ( Aldactone) is a blood pressure medication that also helps your heart to heal.  This helps to prevent remodeling changes in the anterior walls.  You will remain on 12.5 mg once daily.      Below are some Heart Health Tips that we provide to all of our patients. I hope you fing them useful.     - If you are having problems with medications, consider looking at the following websites.   --  \"GoodRx\"   --  \"[facility]\"      - We are happy to supply written prescriptions if needed to allow you to obtain your medications from different pharmacies. Additionally, if you are having issues with mail order delivery, please let us know. We can send a limited supply of your medications to your local pharmacy.     -  We recommend you follow a heart healthy diet. Watch food labels and try not to eat more than 2,500 mg of " sodium per day. Avoid foods high in salt like processed meats (lunch meats, marrero, and sausage), processed foods (boxed dinners, canned soups), fried and fast foods. Monitor serving sizes and if the sodium per serving size is more than 200 mg, avoid those foods. If the sodium per serving size is between 100-200 mg, you can use those in limited quantities. Try to choose foods where the amount of sodium per serving size is less than 100 mg. Try to eat a diet rich in fruits and vegetables, whole grains, low fat dairy products, skinless poultry and fish, nuts, beans, non-tropical vegetable oils. Limit saturated fat, trans fat, sodium, red meats, and sugar-sweetened beverages.   Limit alcohol     -The combination of a reduced-calorie diet and increased physical activity is recommended. Adults should aim to get at least 150 minutes of moderate physical activity per week (30 minutes of moderate physical activities at least 5 days per week). Examples of moderate physical activities include brisk walking, swimming, aerobic dancing, heavy gardening, jumping rope, bicycling 10 MPH or faster, tennis, hiking uphill or with a heavy backpack. Please let us know if you would like to learn more about your nutrition and calories and additional options including weight loss programs to help you reach your goal.     -If you smoke, stop smoking. If you stop smoking you can help get rid of a major source of stress to your heart. Smoking makes your heart rate and blood pressure go up and increases your risk or developing cardiovascular diseases and worsen symptoms associated with heart failure.     -Obtain a BP monitor and monitor your BP daily. Check it around the same time each day; at least 1 hour after taking your medications. Record your BP in a log and bring your log with you to your doctors appointment.     -F/u with your PCP as recommended.

## 2026-01-16 ENCOUNTER — APPOINTMENT (OUTPATIENT)
Dept: CARDIOLOGY | Facility: CLINIC | Age: 71
End: 2026-01-16
Payer: MEDICARE

## (undated) DEVICE — CATHETER, ANGIO, IMPULSE, FR4, 5 FR X 100 CM

## (undated) DEVICE — TR BAND, RADIAL COMPRESSION, STANDARD, 24CM

## (undated) DEVICE — VALVE, HEMOSTASIS, GUARDIAN II NC, W/ GUIDEWIRE INSERTION TOOL

## (undated) DEVICE — CATHETER, BALLOON DILATION, EUPHORA SEMICOMPLIANT 2.0  X 12 MM X 142CM

## (undated) DEVICE — CATHETER, ANGIO, IMPULSE, PIG 155 DEG, 5 FR X 110 CM

## (undated) DEVICE — CATHETER, GUIDING, LAUNCHER, 6FR EBU 3.5

## (undated) DEVICE — GUIDEWIRE, RUN THROUGH WIRE, 180CM

## (undated) DEVICE — CATHETER, ANGIO, IMPULSE, FL3.5, 5 FR X 100 CM

## (undated) DEVICE — INFLATION KIT, ADVANTAGE, ENCORE 26 (1/BOX)

## (undated) DEVICE — GUIDEWIRE, GO2WIRE, STEERABLE, 0.035 X 260CM, FLOPPY STRAIGHT

## (undated) DEVICE — CATHETER, BALLOON, NC EUPHORA NONCOMPLIANT 2.5 X 12 X 142CM

## (undated) DEVICE — SHEATH, GLIDESHEATH, SLENDER, 6FR 10CM